# Patient Record
Sex: MALE | Race: WHITE | NOT HISPANIC OR LATINO | Employment: OTHER | URBAN - METROPOLITAN AREA
[De-identification: names, ages, dates, MRNs, and addresses within clinical notes are randomized per-mention and may not be internally consistent; named-entity substitution may affect disease eponyms.]

---

## 2018-01-08 ENCOUNTER — OFFICE VISIT CONVERTED (OUTPATIENT)
Dept: CARDIOLOGY | Facility: CLINIC | Age: 73
End: 2018-01-08
Attending: INTERNAL MEDICINE

## 2018-02-26 ENCOUNTER — CONVERSION ENCOUNTER (OUTPATIENT)
Dept: CARDIOLOGY | Facility: CLINIC | Age: 73
End: 2018-02-26
Attending: INTERNAL MEDICINE

## 2018-06-29 ENCOUNTER — OFFICE VISIT CONVERTED (OUTPATIENT)
Dept: CARDIOLOGY | Facility: CLINIC | Age: 73
End: 2018-06-29
Attending: INTERNAL MEDICINE

## 2018-07-03 ENCOUNTER — OFFICE VISIT CONVERTED (OUTPATIENT)
Dept: GASTROENTEROLOGY | Facility: CLINIC | Age: 73
End: 2018-07-03
Attending: PHYSICIAN ASSISTANT

## 2018-07-03 ENCOUNTER — CONVERSION ENCOUNTER (OUTPATIENT)
Dept: GASTROENTEROLOGY | Facility: CLINIC | Age: 73
End: 2018-07-03

## 2018-10-08 ENCOUNTER — OFFICE VISIT CONVERTED (OUTPATIENT)
Dept: OTOLARYNGOLOGY | Facility: CLINIC | Age: 73
End: 2018-10-08
Attending: OTOLARYNGOLOGY

## 2018-10-08 ENCOUNTER — CONVERSION ENCOUNTER (OUTPATIENT)
Dept: OTOLARYNGOLOGY | Facility: CLINIC | Age: 73
End: 2018-10-08

## 2018-10-26 ENCOUNTER — OFFICE VISIT CONVERTED (OUTPATIENT)
Dept: OTOLARYNGOLOGY | Facility: CLINIC | Age: 73
End: 2018-10-26
Attending: OTOLARYNGOLOGY

## 2019-01-11 ENCOUNTER — OFFICE VISIT CONVERTED (OUTPATIENT)
Dept: CARDIOLOGY | Facility: CLINIC | Age: 74
End: 2019-01-11
Attending: INTERNAL MEDICINE

## 2019-02-18 ENCOUNTER — HOSPITAL ENCOUNTER (OUTPATIENT)
Dept: LAB | Facility: HOSPITAL | Age: 74
Discharge: HOME | End: 2019-02-18
Attending: INTERNAL MEDICINE

## 2019-02-18 LAB
ALBUMIN SERPL-MCNC: 4.6 G/DL (ref 3.5–5)
ALBUMIN/GLOB SERPL: 2.2 {RATIO} (ref 1.4–2.6)
ALP SERPL-CCNC: 26 U/L (ref 56–155)
ALT SERPL-CCNC: 28 U/L (ref 10–40)
ANION GAP SERPL CALC-SCNC: 22 MMOL/L (ref 8–19)
AST SERPL-CCNC: 23 U/L (ref 15–50)
BASOPHILS # BLD AUTO: 0.05 10*3/UL (ref 0–0.2)
BASOPHILS NFR BLD AUTO: 0.8 % (ref 0–3)
BILIRUB SERPL-MCNC: 0.38 MG/DL (ref 0.2–1.3)
BUN SERPL-MCNC: 29 MG/DL (ref 5–25)
BUN/CREAT SERPL: 19 {RATIO} (ref 6–20)
CALCIUM SERPL-MCNC: 9.8 MG/DL (ref 8.7–10.4)
CHLORIDE SERPL-SCNC: 104 MMOL/L (ref 99–111)
CHOLEST SERPL-MCNC: 188 MG/DL (ref 107–200)
CHOLEST/HDLC SERPL: 6.3 {RATIO} (ref 3–6)
CONV ABS IMM GRAN: 0.02 10*3/UL (ref 0–0.2)
CONV CO2: 19 MMOL/L (ref 22–32)
CONV IMMATURE GRAN: 0.3 % (ref 0–1.8)
CONV TOTAL PROTEIN: 6.7 G/DL (ref 6.3–8.2)
CREAT UR-MCNC: 1.53 MG/DL (ref 0.7–1.2)
DEPRECATED RDW RBC AUTO: 45.3 FL (ref 35.1–43.9)
EOSINOPHIL # BLD AUTO: 0.26 10*3/UL (ref 0–0.7)
EOSINOPHIL # BLD AUTO: 4.1 % (ref 0–7)
ERYTHROCYTE [DISTWIDTH] IN BLOOD BY AUTOMATED COUNT: 13.3 % (ref 11.6–14.4)
EST. AVERAGE GLUCOSE BLD GHB EST-MCNC: 126 MG/DL
FERRITIN SERPL-MCNC: 294 NG/ML (ref 30–300)
GFR SERPLBLD BASED ON 1.73 SQ M-ARVRAT: 45 ML/MIN/{1.73_M2}
GLOBULIN UR ELPH-MCNC: 2.1 G/DL (ref 2–3.5)
GLUCOSE SERPL-MCNC: 121 MG/DL (ref 70–99)
HBA1C MFR BLD: 12.1 G/DL (ref 14–18)
HBA1C MFR BLD: 6 % (ref 3.5–5.7)
HCT VFR BLD AUTO: 37.5 % (ref 42–52)
HDLC SERPL-MCNC: 30 MG/DL (ref 40–60)
IRON SATN MFR SERPL: 28 % (ref 20–55)
IRON SERPL-MCNC: 121 UG/DL (ref 70–180)
LDLC SERPL CALC-MCNC: 114 MG/DL (ref 70–100)
LYMPHOCYTES # BLD AUTO: 2.15 10*3/UL (ref 1–5)
MCH RBC QN AUTO: 29.7 PG (ref 27–31)
MCHC RBC AUTO-ENTMCNC: 32.3 G/DL (ref 33–37)
MCV RBC AUTO: 92.1 FL (ref 80–96)
MONOCYTES # BLD AUTO: 0.43 10*3/UL (ref 0.2–1.2)
MONOCYTES NFR BLD AUTO: 6.7 % (ref 3–10)
NEUTROPHILS # BLD AUTO: 3.5 10*3/UL (ref 2–8)
NEUTROPHILS NFR BLD AUTO: 54.6 % (ref 30–85)
NRBC CBCN: 0 % (ref 0–0.7)
OSMOLALITY SERPL CALC.SUM OF ELEC: 297 MOSM/KG (ref 273–304)
PLATELET # BLD AUTO: 241 10*3/UL (ref 130–400)
PMV BLD AUTO: 11.2 FL (ref 9.4–12.4)
POTASSIUM SERPL-SCNC: 4.9 MMOL/L (ref 3.5–5.3)
RBC # BLD AUTO: 4.07 10*6/UL (ref 4.7–6.1)
SODIUM SERPL-SCNC: 140 MMOL/L (ref 135–147)
TIBC SERPL-MCNC: 426 UG/DL (ref 245–450)
TRANSFERRIN SERPL-MCNC: 298 MG/DL (ref 215–365)
TRIGL SERPL-MCNC: 219 MG/DL (ref 40–150)
VARIANT LYMPHS NFR BLD MANUAL: 33.5 % (ref 20–45)
VLDLC SERPL-MCNC: 44 MG/DL (ref 5–37)
WBC # BLD AUTO: 6.41 10*3/UL (ref 4.8–10.8)

## 2019-05-20 ENCOUNTER — HOSPITAL ENCOUNTER (OUTPATIENT)
Dept: LAB | Facility: HOSPITAL | Age: 74
Discharge: HOME | End: 2019-05-20
Attending: INTERNAL MEDICINE

## 2019-05-20 LAB
ANION GAP SERPL CALC-SCNC: 18 MMOL/L (ref 8–19)
BUN SERPL-MCNC: 25 MG/DL (ref 5–25)
BUN/CREAT SERPL: 19 {RATIO} (ref 6–20)
CALCIUM SERPL-MCNC: 9.2 MG/DL (ref 8.7–10.4)
CHLORIDE SERPL-SCNC: 105 MMOL/L (ref 99–111)
CONV CO2: 22 MMOL/L (ref 22–32)
CREAT UR-MCNC: 1.31 MG/DL (ref 0.7–1.2)
EST. AVERAGE GLUCOSE BLD GHB EST-MCNC: 148 MG/DL
GFR SERPLBLD BASED ON 1.73 SQ M-ARVRAT: 54 ML/MIN/{1.73_M2}
GLUCOSE SERPL-MCNC: 104 MG/DL (ref 70–99)
HBA1C MFR BLD: 6.8 % (ref 3.5–5.7)
OSMOLALITY SERPL CALC.SUM OF ELEC: 295 MOSM/KG (ref 273–304)
POTASSIUM SERPL-SCNC: 4.9 MMOL/L (ref 3.5–5.3)
SODIUM SERPL-SCNC: 140 MMOL/L (ref 135–147)

## 2019-06-03 ENCOUNTER — CONVERSION ENCOUNTER (OUTPATIENT)
Dept: ORTHOPEDIC SURGERY | Facility: CLINIC | Age: 74
End: 2019-06-03

## 2019-06-03 ENCOUNTER — OFFICE VISIT CONVERTED (OUTPATIENT)
Dept: ORTHOPEDIC SURGERY | Facility: CLINIC | Age: 74
End: 2019-06-03
Attending: ORTHOPAEDIC SURGERY

## 2019-07-12 ENCOUNTER — OFFICE VISIT CONVERTED (OUTPATIENT)
Dept: CARDIOLOGY | Facility: CLINIC | Age: 74
End: 2019-07-12
Attending: INTERNAL MEDICINE

## 2019-08-20 ENCOUNTER — HOSPITAL ENCOUNTER (OUTPATIENT)
Dept: LAB | Facility: HOSPITAL | Age: 74
Discharge: HOME | End: 2019-08-20
Attending: INTERNAL MEDICINE

## 2019-08-20 LAB
ALBUMIN SERPL-MCNC: 4.6 G/DL (ref 3.5–5)
ALBUMIN/GLOB SERPL: 1.9 {RATIO} (ref 1.4–2.6)
ALP SERPL-CCNC: 28 U/L (ref 56–155)
ALT SERPL-CCNC: 21 U/L (ref 10–40)
ANION GAP SERPL CALC-SCNC: 19 MMOL/L (ref 8–19)
AST SERPL-CCNC: 20 U/L (ref 15–50)
BASOPHILS # BLD AUTO: 0.05 10*3/UL (ref 0–0.2)
BASOPHILS NFR BLD AUTO: 0.6 % (ref 0–3)
BILIRUB SERPL-MCNC: 0.41 MG/DL (ref 0.2–1.3)
BUN SERPL-MCNC: 23 MG/DL (ref 5–25)
BUN/CREAT SERPL: 17 {RATIO} (ref 6–20)
CALCIUM SERPL-MCNC: 9.4 MG/DL (ref 8.7–10.4)
CHLORIDE SERPL-SCNC: 102 MMOL/L (ref 99–111)
CHOLEST SERPL-MCNC: 169 MG/DL (ref 107–200)
CHOLEST/HDLC SERPL: 5.6 {RATIO} (ref 3–6)
CONV ABS IMM GRAN: 0.04 10*3/UL (ref 0–0.2)
CONV CO2: 22 MMOL/L (ref 22–32)
CONV CREATININE URINE, RANDOM: 32 MG/DL (ref 10–300)
CONV IMMATURE GRAN: 0.5 % (ref 0–1.8)
CONV MICROALBUM.,U,RANDOM: 117.1 MG/L (ref 0–20)
CONV TOTAL PROTEIN: 7 G/DL (ref 6.3–8.2)
CREAT UR-MCNC: 1.34 MG/DL (ref 0.7–1.2)
DEPRECATED RDW RBC AUTO: 45.5 FL (ref 35.1–43.9)
EOSINOPHIL # BLD AUTO: 0.22 10*3/UL (ref 0–0.7)
EOSINOPHIL # BLD AUTO: 2.7 % (ref 0–7)
ERYTHROCYTE [DISTWIDTH] IN BLOOD BY AUTOMATED COUNT: 13.4 % (ref 11.6–14.4)
EST. AVERAGE GLUCOSE BLD GHB EST-MCNC: 134 MG/DL
FERRITIN SERPL-MCNC: 295 NG/ML (ref 30–300)
GFR SERPLBLD BASED ON 1.73 SQ M-ARVRAT: 53 ML/MIN/{1.73_M2}
GLOBULIN UR ELPH-MCNC: 2.4 G/DL (ref 2–3.5)
GLUCOSE SERPL-MCNC: 137 MG/DL (ref 70–99)
HBA1C MFR BLD: 6.3 % (ref 3.5–5.7)
HCT VFR BLD AUTO: 35.3 % (ref 42–52)
HDLC SERPL-MCNC: 30 MG/DL (ref 40–60)
HGB BLD-MCNC: 11.4 G/DL (ref 14–18)
IRON SATN MFR SERPL: 26 % (ref 20–55)
IRON SERPL-MCNC: 108 UG/DL (ref 70–180)
LDLC SERPL CALC-MCNC: 97 MG/DL (ref 70–100)
LYMPHOCYTES # BLD AUTO: 2.06 10*3/UL (ref 1–5)
LYMPHOCYTES NFR BLD AUTO: 25.6 % (ref 20–45)
MCH RBC QN AUTO: 29.9 PG (ref 27–31)
MCHC RBC AUTO-ENTMCNC: 32.3 G/DL (ref 33–37)
MCV RBC AUTO: 92.7 FL (ref 80–96)
MICROALBUMIN/CREAT UR: 365.9 MG/G{CRE} (ref 0–25)
MONOCYTES # BLD AUTO: 0.47 10*3/UL (ref 0.2–1.2)
MONOCYTES NFR BLD AUTO: 5.8 % (ref 3–10)
NEUTROPHILS # BLD AUTO: 5.21 10*3/UL (ref 2–8)
NEUTROPHILS NFR BLD AUTO: 64.8 % (ref 30–85)
NRBC CBCN: 0 % (ref 0–0.7)
OSMOLALITY SERPL CALC.SUM OF ELEC: 292 MOSM/KG (ref 273–304)
PLATELET # BLD AUTO: 296 10*3/UL (ref 130–400)
PMV BLD AUTO: 10.9 FL (ref 9.4–12.4)
POTASSIUM SERPL-SCNC: 4.7 MMOL/L (ref 3.5–5.3)
RBC # BLD AUTO: 3.81 10*6/UL (ref 4.7–6.1)
SODIUM SERPL-SCNC: 138 MMOL/L (ref 135–147)
TIBC SERPL-MCNC: 418 UG/DL (ref 245–450)
TRANSFERRIN SERPL-MCNC: 292 MG/DL (ref 215–365)
TRIGL SERPL-MCNC: 210 MG/DL (ref 40–150)
VLDLC SERPL-MCNC: 42 MG/DL (ref 5–37)
WBC # BLD AUTO: 8.05 10*3/UL (ref 4.8–10.8)

## 2019-09-16 ENCOUNTER — HOSPITAL ENCOUNTER (OUTPATIENT)
Dept: OTHER | Facility: HOSPITAL | Age: 74
Discharge: HOME | End: 2019-09-16
Attending: INTERNAL MEDICINE

## 2019-09-16 ENCOUNTER — OFFICE VISIT CONVERTED (OUTPATIENT)
Dept: OTHER | Facility: HOSPITAL | Age: 74
End: 2019-09-16
Attending: INTERNAL MEDICINE

## 2019-09-16 LAB
ALBUMIN SERPL-MCNC: 4.6 G/DL (ref 3.5–5)
ALBUMIN/GLOB SERPL: 2.3 {RATIO} (ref 1.4–2.6)
ALP SERPL-CCNC: 38 U/L (ref 56–155)
ALT SERPL-CCNC: 20 U/L (ref 10–40)
ANION GAP SERPL CALC-SCNC: 19 MMOL/L (ref 8–19)
AST SERPL-CCNC: 20 U/L (ref 15–50)
BASOPHILS # BLD AUTO: 0.06 10*3/UL (ref 0–0.2)
BASOPHILS NFR BLD AUTO: 0.7 % (ref 0–3)
BILIRUB SERPL-MCNC: <0.15 MG/DL (ref 0.2–1.3)
BUN SERPL-MCNC: 31 MG/DL (ref 5–25)
BUN/CREAT SERPL: 22 {RATIO} (ref 6–20)
CALCIUM SERPL-MCNC: 9.7 MG/DL (ref 8.7–10.4)
CHLORIDE SERPL-SCNC: 108 MMOL/L (ref 99–111)
CONV ABS IMM GRAN: 0.02 10*3/UL (ref 0–0.2)
CONV CO2: 19 MMOL/L (ref 22–32)
CONV IMMATURE GRAN: 0.2 % (ref 0–1.8)
CONV TOTAL PROTEIN: 6.6 G/DL (ref 6.3–8.2)
CREAT UR-MCNC: 1.44 MG/DL (ref 0.7–1.2)
DEPRECATED RDW RBC AUTO: 47.5 FL (ref 35.1–43.9)
EOSINOPHIL # BLD AUTO: 0.21 10*3/UL (ref 0–0.7)
EOSINOPHIL # BLD AUTO: 2.6 % (ref 0–7)
ERYTHROCYTE [DISTWIDTH] IN BLOOD BY AUTOMATED COUNT: 13.2 % (ref 11.6–14.4)
FERRITIN SERPL-MCNC: 237 NG/ML (ref 30–300)
FOLATE SERPL-MCNC: 16.8 NG/ML (ref 4.8–20)
GFR SERPLBLD BASED ON 1.73 SQ M-ARVRAT: 48 ML/MIN/{1.73_M2}
GLOBULIN UR ELPH-MCNC: 2 G/DL (ref 2–3.5)
GLUCOSE SERPL-MCNC: 117 MG/DL (ref 70–99)
HCT VFR BLD AUTO: 37.5 % (ref 42–52)
HGB BLD-MCNC: 11.5 G/DL (ref 14–18)
IRON SATN MFR SERPL: 14 % (ref 20–55)
IRON SERPL-MCNC: 54 UG/DL (ref 70–180)
LYMPHOCYTES # BLD AUTO: 2.47 10*3/UL (ref 1–5)
LYMPHOCYTES NFR BLD AUTO: 30.5 % (ref 20–45)
MCH RBC QN AUTO: 29.9 PG (ref 27–31)
MCHC RBC AUTO-ENTMCNC: 30.7 G/DL (ref 33–37)
MCV RBC AUTO: 97.7 FL (ref 80–96)
MONOCYTES # BLD AUTO: 0.57 10*3/UL (ref 0.2–1.2)
MONOCYTES NFR BLD AUTO: 7 % (ref 3–10)
NEUTROPHILS # BLD AUTO: 4.77 10*3/UL (ref 2–8)
NEUTROPHILS NFR BLD AUTO: 59 % (ref 30–85)
NRBC CBCN: 0 % (ref 0–0.7)
OSMOLALITY SERPL CALC.SUM OF ELEC: 302 MOSM/KG (ref 273–304)
PLATELET # BLD AUTO: 255 10*3/UL (ref 130–400)
PMV BLD AUTO: 10.5 FL (ref 9.4–12.4)
POTASSIUM SERPL-SCNC: 4.4 MMOL/L (ref 3.5–5.3)
RBC # BLD AUTO: 3.84 10*6/UL (ref 4.7–6.1)
RETICS # AUTO: 0.07 10*6/UL (ref 0.03–0.1)
RETICS/RBC NFR AUTO: 1.81 % (ref 0.51–1.81)
SODIUM SERPL-SCNC: 142 MMOL/L (ref 135–147)
T4 FREE SERPL-MCNC: 1.1 NG/DL (ref 0.9–1.8)
TIBC SERPL-MCNC: 396 UG/DL (ref 245–450)
TRANSFERRIN SERPL-MCNC: 277 MG/DL (ref 215–365)
TSH SERPL-ACNC: 1.76 M[IU]/L (ref 0.27–4.2)
VIT B12 SERPL-MCNC: 342 PG/ML (ref 211–911)
WBC # BLD AUTO: 8.1 10*3/UL (ref 4.8–10.8)

## 2019-09-17 LAB
ALBUMIN SERPL-MCNC: 3.9 G/DL (ref 2.9–4.4)
ALBUMIN/GLOB SERPL: 1.7 {RATIO} (ref 0.7–1.7)
ALPHA2 GLOB SERPL ELPH-MCNC: 0.7 G/DL (ref 0.4–1)
BETA GLOBULIN: 1 G/DL (ref 0.7–1.3)
CONV ALPHA-1-GLOBULIN: 0.2 G/DL (ref 0–0.4)
CONV IMMUNOGLOBULIN G (IGG): 520 MG/DL (ref 700–1600)
CONV IMMUNOGLOBULIN M (IGM): 23 MG/DL (ref 15–143)
CONV PE INTERPRETATION: ABNORMAL
CONV PE NOTE: ABNORMAL
CONV TOTAL PROTEIN: 6.2 G/DL (ref 6–8.5)
FREE LIGHT CHAINS: 36 MG/L (ref 3.3–19.4)
GAMMA GLOB SERPL ELPH-MCNC: 0.4 G/DL (ref 0.4–1.8)
GLOBULIN UR ELPH-MCNC: 2.3 G/DL (ref 2.2–3.9)
IGA SERPL-MCNC: 124 MG/DL (ref 61–437)
KAPPA LC/LAMBDA SER: 1.83 {RATIO} (ref 0.26–1.65)
LAMBDA LC FREE SERPL-MCNC: 19.7 MG/L (ref 5.7–26.3)
M-SPIKE: ABNORMAL G/DL
PROT PATTERN SERPL IFE-IMP: ABNORMAL

## 2019-09-19 LAB — EPO SERPL-ACNC: 8.8 MIU/ML (ref 2.6–18.5)

## 2019-10-02 ENCOUNTER — OFFICE VISIT CONVERTED (OUTPATIENT)
Dept: OTHER | Facility: HOSPITAL | Age: 74
End: 2019-10-02
Attending: INTERNAL MEDICINE

## 2019-10-28 ENCOUNTER — CONVERSION ENCOUNTER (OUTPATIENT)
Dept: GASTROENTEROLOGY | Facility: CLINIC | Age: 74
End: 2019-10-28

## 2019-10-28 ENCOUNTER — OFFICE VISIT CONVERTED (OUTPATIENT)
Dept: GASTROENTEROLOGY | Facility: CLINIC | Age: 74
End: 2019-10-28
Attending: PHYSICIAN ASSISTANT

## 2019-11-06 ENCOUNTER — PROCEDURE VISIT CONVERTED (OUTPATIENT)
Dept: GASTROENTEROLOGY | Facility: CLINIC | Age: 74
End: 2019-11-06
Attending: NURSE PRACTITIONER

## 2019-11-26 ENCOUNTER — HOSPITAL ENCOUNTER (OUTPATIENT)
Dept: LAB | Facility: HOSPITAL | Age: 74
Discharge: HOME | End: 2019-11-26
Attending: INTERNAL MEDICINE

## 2019-11-26 LAB
ANION GAP SERPL CALC-SCNC: 18 MMOL/L (ref 8–19)
BASOPHILS # BLD AUTO: 0.07 10*3/UL (ref 0–0.2)
BASOPHILS NFR BLD AUTO: 0.9 % (ref 0–3)
BUN SERPL-MCNC: 26 MG/DL (ref 5–25)
BUN/CREAT SERPL: 19 {RATIO} (ref 6–20)
CALCIUM SERPL-MCNC: 9.9 MG/DL (ref 8.7–10.4)
CHLORIDE SERPL-SCNC: 104 MMOL/L (ref 99–111)
CONV ABS IMM GRAN: 0.01 10*3/UL (ref 0–0.2)
CONV CO2: 23 MMOL/L (ref 22–32)
CONV IMMATURE GRAN: 0.1 % (ref 0–1.8)
CREAT UR-MCNC: 1.37 MG/DL (ref 0.7–1.2)
DEPRECATED RDW RBC AUTO: 43.7 FL (ref 35.1–43.9)
EOSINOPHIL # BLD AUTO: 0.26 10*3/UL (ref 0–0.7)
EOSINOPHIL # BLD AUTO: 3.5 % (ref 0–7)
ERYTHROCYTE [DISTWIDTH] IN BLOOD BY AUTOMATED COUNT: 12.8 % (ref 11.6–14.4)
EST. AVERAGE GLUCOSE BLD GHB EST-MCNC: 143 MG/DL
FOLATE SERPL-MCNC: 18.4 NG/ML (ref 4.8–20)
GFR SERPLBLD BASED ON 1.73 SQ M-ARVRAT: 51 ML/MIN/{1.73_M2}
GLUCOSE SERPL-MCNC: 111 MG/DL (ref 70–99)
HBA1C MFR BLD: 6.6 % (ref 3.5–5.7)
HCT VFR BLD AUTO: 38.1 % (ref 42–52)
HGB BLD-MCNC: 12.2 G/DL (ref 14–18)
IRON SATN MFR SERPL: 20 % (ref 20–55)
IRON SERPL-MCNC: 95 UG/DL (ref 70–180)
LYMPHOCYTES # BLD AUTO: 2.44 10*3/UL (ref 1–5)
LYMPHOCYTES NFR BLD AUTO: 32.8 % (ref 20–45)
MCH RBC QN AUTO: 29.8 PG (ref 27–31)
MCHC RBC AUTO-ENTMCNC: 32 G/DL (ref 33–37)
MCV RBC AUTO: 92.9 FL (ref 80–96)
MONOCYTES # BLD AUTO: 0.45 10*3/UL (ref 0.2–1.2)
MONOCYTES NFR BLD AUTO: 6 % (ref 3–10)
NEUTROPHILS # BLD AUTO: 4.22 10*3/UL (ref 2–8)
NEUTROPHILS NFR BLD AUTO: 56.7 % (ref 30–85)
NRBC CBCN: 0 % (ref 0–0.7)
OSMOLALITY SERPL CALC.SUM OF ELEC: 295 MOSM/KG (ref 273–304)
PLATELET # BLD AUTO: 237 10*3/UL (ref 130–400)
PMV BLD AUTO: 11.2 FL (ref 9.4–12.4)
POTASSIUM SERPL-SCNC: 4.6 MMOL/L (ref 3.5–5.3)
RBC # BLD AUTO: 4.1 10*6/UL (ref 4.7–6.1)
SODIUM SERPL-SCNC: 140 MMOL/L (ref 135–147)
TIBC SERPL-MCNC: 466 UG/DL (ref 245–450)
TRANSFERRIN SERPL-MCNC: 326 MG/DL (ref 215–365)
VIT B12 SERPL-MCNC: 871 PG/ML (ref 211–911)
WBC # BLD AUTO: 7.45 10*3/UL (ref 4.8–10.8)

## 2019-12-04 ENCOUNTER — OFFICE VISIT CONVERTED (OUTPATIENT)
Dept: OTHER | Facility: HOSPITAL | Age: 74
End: 2019-12-04
Attending: INTERNAL MEDICINE

## 2020-03-03 ENCOUNTER — HOSPITAL ENCOUNTER (OUTPATIENT)
Dept: LAB | Facility: HOSPITAL | Age: 75
Discharge: HOME | End: 2020-03-03
Attending: INTERNAL MEDICINE

## 2020-03-03 LAB
ALBUMIN SERPL-MCNC: 4.5 G/DL (ref 3.5–5)
ALBUMIN/GLOB SERPL: 2 {RATIO} (ref 1.4–2.6)
ALP SERPL-CCNC: 28 U/L (ref 56–155)
ALT SERPL-CCNC: 23 U/L (ref 10–40)
ANION GAP SERPL CALC-SCNC: 22 MMOL/L (ref 8–19)
AST SERPL-CCNC: 20 U/L (ref 15–50)
BILIRUB SERPL-MCNC: 0.44 MG/DL (ref 0.2–1.3)
BUN SERPL-MCNC: 26 MG/DL (ref 5–25)
BUN/CREAT SERPL: 18 {RATIO} (ref 6–20)
CALCIUM SERPL-MCNC: 10.1 MG/DL (ref 8.7–10.4)
CHLORIDE SERPL-SCNC: 101 MMOL/L (ref 99–111)
CHOLEST SERPL-MCNC: 179 MG/DL (ref 107–200)
CHOLEST/HDLC SERPL: 6.2 {RATIO} (ref 3–6)
CONV CO2: 20 MMOL/L (ref 22–32)
CONV CREATININE URINE, RANDOM: 63.7 MG/DL (ref 10–300)
CONV MICROALBUM.,U,RANDOM: 495.6 MG/L (ref 0–20)
CONV TOTAL PROTEIN: 6.8 G/DL (ref 6.3–8.2)
CREAT UR-MCNC: 1.43 MG/DL (ref 0.7–1.2)
EST. AVERAGE GLUCOSE BLD GHB EST-MCNC: 143 MG/DL
GFR SERPLBLD BASED ON 1.73 SQ M-ARVRAT: 48 ML/MIN/{1.73_M2}
GLOBULIN UR ELPH-MCNC: 2.3 G/DL (ref 2–3.5)
GLUCOSE SERPL-MCNC: 130 MG/DL (ref 70–99)
HBA1C MFR BLD: 6.6 % (ref 3.5–5.7)
HDLC SERPL-MCNC: 29 MG/DL (ref 40–60)
LDLC SERPL CALC-MCNC: 109 MG/DL (ref 70–100)
MICROALBUMIN/CREAT UR: 778 MG/G{CRE} (ref 0–25)
OSMOLALITY SERPL CALC.SUM OF ELEC: 293 MOSM/KG (ref 273–304)
POTASSIUM SERPL-SCNC: 4.9 MMOL/L (ref 3.5–5.3)
SODIUM SERPL-SCNC: 138 MMOL/L (ref 135–147)
TRIGL SERPL-MCNC: 205 MG/DL (ref 40–150)
VLDLC SERPL-MCNC: 41 MG/DL (ref 5–37)

## 2020-04-02 ENCOUNTER — HOSPITAL ENCOUNTER (OUTPATIENT)
Dept: OTHER | Facility: HOSPITAL | Age: 75
Discharge: HOME | End: 2020-04-02
Attending: INTERNAL MEDICINE

## 2020-04-02 LAB
BASOPHILS # BLD AUTO: 0.07 10*3/UL (ref 0–0.2)
BASOPHILS NFR BLD AUTO: 0.9 % (ref 0–3)
CONV ABS IMM GRAN: 0.03 10*3/UL (ref 0–0.2)
CONV IMMATURE GRAN: 0.4 % (ref 0–1.8)
DEPRECATED RDW RBC AUTO: 44.2 FL (ref 35.1–43.9)
EOSINOPHIL # BLD AUTO: 0.27 10*3/UL (ref 0–0.7)
EOSINOPHIL # BLD AUTO: 3.6 % (ref 0–7)
ERYTHROCYTE [DISTWIDTH] IN BLOOD BY AUTOMATED COUNT: 12.8 % (ref 11.6–14.4)
HCT VFR BLD AUTO: 35.2 % (ref 42–52)
HGB BLD-MCNC: 11.3 G/DL (ref 14–18)
IRON SATN MFR SERPL: 33 % (ref 20–55)
IRON SERPL-MCNC: 109 UG/DL (ref 70–180)
LYMPHOCYTES # BLD AUTO: 2.21 10*3/UL (ref 1–5)
LYMPHOCYTES NFR BLD AUTO: 29.2 % (ref 20–45)
MCH RBC QN AUTO: 30.4 PG (ref 27–31)
MCHC RBC AUTO-ENTMCNC: 32.1 G/DL (ref 33–37)
MCV RBC AUTO: 94.6 FL (ref 80–96)
MONOCYTES # BLD AUTO: 0.6 10*3/UL (ref 0.2–1.2)
MONOCYTES NFR BLD AUTO: 7.9 % (ref 3–10)
NEUTROPHILS # BLD AUTO: 4.38 10*3/UL (ref 2–8)
NEUTROPHILS NFR BLD AUTO: 58 % (ref 30–85)
NRBC CBCN: 0 % (ref 0–0.7)
PLATELET # BLD AUTO: 220 10*3/UL (ref 130–400)
PMV BLD AUTO: 11.5 FL (ref 9.4–12.4)
RBC # BLD AUTO: 3.72 10*6/UL (ref 4.7–6.1)
RETICS # AUTO: 0.07 10*6/UL (ref 0.03–0.1)
RETICS/RBC NFR AUTO: 1.84 % (ref 0.51–1.81)
TIBC SERPL-MCNC: 333 UG/DL (ref 245–450)
TRANSFERRIN SERPL-MCNC: 233 MG/DL (ref 215–365)
WBC # BLD AUTO: 7.56 10*3/UL (ref 4.8–10.8)

## 2020-04-13 ENCOUNTER — OFFICE VISIT CONVERTED (OUTPATIENT)
Dept: OTHER | Facility: HOSPITAL | Age: 75
End: 2020-04-13
Attending: INTERNAL MEDICINE

## 2020-06-09 ENCOUNTER — HOSPITAL ENCOUNTER (OUTPATIENT)
Dept: LAB | Facility: HOSPITAL | Age: 75
Discharge: HOME | End: 2020-06-09
Attending: INTERNAL MEDICINE

## 2020-06-09 LAB
ALBUMIN SERPL-MCNC: 4.3 G/DL (ref 3.5–5)
ALBUMIN/GLOB SERPL: 1.8 {RATIO} (ref 1.4–2.6)
ALP SERPL-CCNC: 40 U/L (ref 56–155)
ALT SERPL-CCNC: 20 U/L (ref 10–40)
ANION GAP SERPL CALC-SCNC: 18 MMOL/L (ref 8–19)
AST SERPL-CCNC: 18 U/L (ref 15–50)
BASOPHILS # BLD AUTO: 0.05 10*3/UL (ref 0–0.2)
BASOPHILS NFR BLD AUTO: 0.6 % (ref 0–3)
BILIRUB SERPL-MCNC: 0.45 MG/DL (ref 0.2–1.3)
BUN SERPL-MCNC: 30 MG/DL (ref 5–25)
BUN/CREAT SERPL: 26 {RATIO} (ref 6–20)
CALCIUM SERPL-MCNC: 9.6 MG/DL (ref 8.7–10.4)
CHLORIDE SERPL-SCNC: 103 MMOL/L (ref 99–111)
CHOLEST SERPL-MCNC: 160 MG/DL (ref 107–200)
CHOLEST/HDLC SERPL: 5.3 {RATIO} (ref 3–6)
CK SERPL-CCNC: 182 U/L (ref 57–374)
CONV ABS IMM GRAN: 0.03 10*3/UL (ref 0–0.2)
CONV CO2: 19 MMOL/L (ref 22–32)
CONV IMMATURE GRAN: 0.4 % (ref 0–1.8)
CONV TOTAL PROTEIN: 6.7 G/DL (ref 6.3–8.2)
CREAT UR-MCNC: 1.14 MG/DL (ref 0.7–1.2)
DEPRECATED RDW RBC AUTO: 41.8 FL (ref 35.1–43.9)
EOSINOPHIL # BLD AUTO: 0.22 10*3/UL (ref 0–0.7)
EOSINOPHIL # BLD AUTO: 2.7 % (ref 0–7)
ERYTHROCYTE [DISTWIDTH] IN BLOOD BY AUTOMATED COUNT: 12.5 % (ref 11.6–14.4)
EST. AVERAGE GLUCOSE BLD GHB EST-MCNC: 140 MG/DL
FOLATE SERPL-MCNC: >20 NG/ML (ref 4.8–20)
GFR SERPLBLD BASED ON 1.73 SQ M-ARVRAT: >60 ML/MIN/{1.73_M2}
GLOBULIN UR ELPH-MCNC: 2.4 G/DL (ref 2–3.5)
GLUCOSE SERPL-MCNC: 131 MG/DL (ref 70–99)
HBA1C MFR BLD: 6.5 % (ref 3.5–5.7)
HCT VFR BLD AUTO: 39.6 % (ref 42–52)
HDLC SERPL-MCNC: 30 MG/DL (ref 40–60)
HGB BLD-MCNC: 13 G/DL (ref 14–18)
IRON SATN MFR SERPL: 42 % (ref 20–55)
IRON SERPL-MCNC: 136 UG/DL (ref 70–180)
LDLC SERPL CALC-MCNC: 72 MG/DL (ref 70–100)
LYMPHOCYTES # BLD AUTO: 2.16 10*3/UL (ref 1–5)
LYMPHOCYTES NFR BLD AUTO: 26.6 % (ref 20–45)
MCH RBC QN AUTO: 30.1 PG (ref 27–31)
MCHC RBC AUTO-ENTMCNC: 32.8 G/DL (ref 33–37)
MCV RBC AUTO: 91.7 FL (ref 80–96)
MONOCYTES # BLD AUTO: 0.53 10*3/UL (ref 0.2–1.2)
MONOCYTES NFR BLD AUTO: 6.5 % (ref 3–10)
NEUTROPHILS # BLD AUTO: 5.13 10*3/UL (ref 2–8)
NEUTROPHILS NFR BLD AUTO: 63.2 % (ref 30–85)
NRBC CBCN: 0 % (ref 0–0.7)
OSMOLALITY SERPL CALC.SUM OF ELEC: 288 MOSM/KG (ref 273–304)
PLATELET # BLD AUTO: 223 10*3/UL (ref 130–400)
PMV BLD AUTO: 11.6 FL (ref 9.4–12.4)
POTASSIUM SERPL-SCNC: 4.9 MMOL/L (ref 3.5–5.3)
RBC # BLD AUTO: 4.32 10*6/UL (ref 4.7–6.1)
SODIUM SERPL-SCNC: 135 MMOL/L (ref 135–147)
TIBC SERPL-MCNC: 325 UG/DL (ref 245–450)
TRANSFERRIN SERPL-MCNC: 227 MG/DL (ref 215–365)
TRIGL SERPL-MCNC: 291 MG/DL (ref 40–150)
VIT B12 SERPL-MCNC: 923 PG/ML (ref 211–911)
VLDLC SERPL-MCNC: 58 MG/DL (ref 5–37)
WBC # BLD AUTO: 8.12 10*3/UL (ref 4.8–10.8)

## 2020-06-16 ENCOUNTER — OFFICE VISIT CONVERTED (OUTPATIENT)
Dept: OTHER | Facility: HOSPITAL | Age: 75
End: 2020-06-16
Attending: INTERNAL MEDICINE

## 2020-09-08 ENCOUNTER — HOSPITAL ENCOUNTER (OUTPATIENT)
Dept: LAB | Facility: HOSPITAL | Age: 75
Discharge: HOME | End: 2020-09-08
Attending: INTERNAL MEDICINE

## 2020-09-08 LAB
ALBUMIN SERPL-MCNC: 4 G/DL (ref 3.5–5)
ALBUMIN/GLOB SERPL: 1.7 {RATIO} (ref 1.4–2.6)
ALP SERPL-CCNC: 37 U/L (ref 56–155)
ALT SERPL-CCNC: 22 U/L (ref 10–40)
ANION GAP SERPL CALC-SCNC: 17 MMOL/L (ref 8–19)
AST SERPL-CCNC: 21 U/L (ref 15–50)
BILIRUB SERPL-MCNC: 0.4 MG/DL (ref 0.2–1.3)
BUN SERPL-MCNC: 21 MG/DL (ref 5–25)
BUN/CREAT SERPL: 19 {RATIO} (ref 6–20)
CALCIUM SERPL-MCNC: 9.2 MG/DL (ref 8.7–10.4)
CHLORIDE SERPL-SCNC: 104 MMOL/L (ref 99–111)
CHOLEST SERPL-MCNC: 151 MG/DL (ref 107–200)
CHOLEST/HDLC SERPL: 4.9 {RATIO} (ref 3–6)
CONV CO2: 22 MMOL/L (ref 22–32)
CONV TOTAL PROTEIN: 6.3 G/DL (ref 6.3–8.2)
CREAT UR-MCNC: 1.12 MG/DL (ref 0.7–1.2)
EST. AVERAGE GLUCOSE BLD GHB EST-MCNC: 134 MG/DL
GFR SERPLBLD BASED ON 1.73 SQ M-ARVRAT: >60 ML/MIN/{1.73_M2}
GLOBULIN UR ELPH-MCNC: 2.3 G/DL (ref 2–3.5)
GLUCOSE SERPL-MCNC: 127 MG/DL (ref 70–99)
HBA1C MFR BLD: 6.3 % (ref 3.5–5.7)
HDLC SERPL-MCNC: 31 MG/DL (ref 40–60)
LDLC SERPL CALC-MCNC: 76 MG/DL (ref 70–100)
OSMOLALITY SERPL CALC.SUM OF ELEC: 291 MOSM/KG (ref 273–304)
POTASSIUM SERPL-SCNC: 5.3 MMOL/L (ref 3.5–5.3)
SODIUM SERPL-SCNC: 138 MMOL/L (ref 135–147)
TRIGL SERPL-MCNC: 219 MG/DL (ref 40–150)
VLDLC SERPL-MCNC: 44 MG/DL (ref 5–37)

## 2020-11-06 ENCOUNTER — HOSPITAL ENCOUNTER (OUTPATIENT)
Dept: GENERAL RADIOLOGY | Facility: HOSPITAL | Age: 75
Discharge: HOME | End: 2020-11-06
Attending: INTERNAL MEDICINE

## 2021-01-12 ENCOUNTER — HOSPITAL ENCOUNTER (OUTPATIENT)
Dept: LAB | Facility: HOSPITAL | Age: 76
Discharge: HOME | End: 2021-01-12
Attending: INTERNAL MEDICINE

## 2021-01-12 LAB
ALBUMIN SERPL-MCNC: 3.9 G/DL (ref 3.5–5)
ALBUMIN/GLOB SERPL: 1.7 {RATIO} (ref 1.4–2.6)
ALP SERPL-CCNC: 46 U/L (ref 56–155)
ALT SERPL-CCNC: 13 U/L (ref 10–40)
ANION GAP SERPL CALC-SCNC: 16 MMOL/L (ref 8–19)
AST SERPL-CCNC: 13 U/L (ref 15–50)
BASOPHILS # BLD AUTO: 0.05 10*3/UL (ref 0–0.2)
BASOPHILS NFR BLD AUTO: 0.7 % (ref 0–3)
BILIRUB SERPL-MCNC: 0.38 MG/DL (ref 0.2–1.3)
BUN SERPL-MCNC: 22 MG/DL (ref 5–25)
BUN/CREAT SERPL: 17 {RATIO} (ref 6–20)
CALCIUM SERPL-MCNC: 9.3 MG/DL (ref 8.7–10.4)
CHLORIDE SERPL-SCNC: 101 MMOL/L (ref 99–111)
CHOLEST SERPL-MCNC: 153 MG/DL (ref 107–200)
CHOLEST/HDLC SERPL: 4.5 {RATIO} (ref 3–6)
CONV ABS IMM GRAN: 0.03 10*3/UL (ref 0–0.2)
CONV CO2: 25 MMOL/L (ref 22–32)
CONV IMMATURE GRAN: 0.4 % (ref 0–1.8)
CONV TOTAL PROTEIN: 6.2 G/DL (ref 6.3–8.2)
CREAT UR-MCNC: 1.28 MG/DL (ref 0.7–1.2)
DEPRECATED RDW RBC AUTO: 42.7 FL (ref 35.1–43.9)
EOSINOPHIL # BLD AUTO: 0.3 10*3/UL (ref 0–0.7)
EOSINOPHIL # BLD AUTO: 4.2 % (ref 0–7)
ERYTHROCYTE [DISTWIDTH] IN BLOOD BY AUTOMATED COUNT: 12.9 % (ref 11.6–14.4)
EST. AVERAGE GLUCOSE BLD GHB EST-MCNC: 134 MG/DL
FERRITIN SERPL-MCNC: 277 NG/ML (ref 30–300)
GFR SERPLBLD BASED ON 1.73 SQ M-ARVRAT: 55 ML/MIN/{1.73_M2}
GLOBULIN UR ELPH-MCNC: 2.3 G/DL (ref 2–3.5)
GLUCOSE SERPL-MCNC: 145 MG/DL (ref 70–99)
HBA1C MFR BLD: 6.3 % (ref 3.5–5.7)
HCT VFR BLD AUTO: 38.9 % (ref 42–52)
HDLC SERPL-MCNC: 34 MG/DL (ref 40–60)
HGB BLD-MCNC: 12.7 G/DL (ref 14–18)
IRON SATN MFR SERPL: 45 % (ref 20–55)
IRON SERPL-MCNC: 140 UG/DL (ref 70–180)
LDLC SERPL CALC-MCNC: 80 MG/DL (ref 70–100)
LYMPHOCYTES # BLD AUTO: 1.86 10*3/UL (ref 1–5)
LYMPHOCYTES NFR BLD AUTO: 26 % (ref 20–45)
MCH RBC QN AUTO: 29.7 PG (ref 27–31)
MCHC RBC AUTO-ENTMCNC: 32.6 G/DL (ref 33–37)
MCV RBC AUTO: 91.1 FL (ref 80–96)
MONOCYTES # BLD AUTO: 0.51 10*3/UL (ref 0.2–1.2)
MONOCYTES NFR BLD AUTO: 7.1 % (ref 3–10)
NEUTROPHILS # BLD AUTO: 4.41 10*3/UL (ref 2–8)
NEUTROPHILS NFR BLD AUTO: 61.6 % (ref 30–85)
NRBC CBCN: 0 % (ref 0–0.7)
OSMOLALITY SERPL CALC.SUM OF ELEC: 290 MOSM/KG (ref 273–304)
PLATELET # BLD AUTO: 160 10*3/UL (ref 130–400)
PMV BLD AUTO: 11.7 FL (ref 9.4–12.4)
POTASSIUM SERPL-SCNC: 5.3 MMOL/L (ref 3.5–5.3)
RBC # BLD AUTO: 4.27 10*6/UL (ref 4.7–6.1)
SODIUM SERPL-SCNC: 137 MMOL/L (ref 135–147)
TIBC SERPL-MCNC: 310 UG/DL (ref 245–450)
TRANSFERRIN SERPL-MCNC: 217 MG/DL (ref 215–365)
TRIGL SERPL-MCNC: 195 MG/DL (ref 40–150)
VLDLC SERPL-MCNC: 39 MG/DL (ref 5–37)
WBC # BLD AUTO: 7.16 10*3/UL (ref 4.8–10.8)

## 2021-01-18 ENCOUNTER — HOSPITAL ENCOUNTER (OUTPATIENT)
Dept: CARDIOLOGY | Facility: HOSPITAL | Age: 76
Discharge: HOME | End: 2021-01-18
Attending: INTERNAL MEDICINE

## 2021-05-11 ENCOUNTER — HOSPITAL ENCOUNTER (OUTPATIENT)
Dept: LAB | Facility: HOSPITAL | Age: 76
Discharge: HOME | End: 2021-05-11
Attending: INTERNAL MEDICINE

## 2021-05-11 LAB
ANION GAP SERPL CALC-SCNC: 19 MMOL/L (ref 8–19)
BUN SERPL-MCNC: 26 MG/DL (ref 5–25)
BUN/CREAT SERPL: 16 {RATIO} (ref 6–20)
CALCIUM SERPL-MCNC: 9.2 MG/DL (ref 8.7–10.4)
CHLORIDE SERPL-SCNC: 105 MMOL/L (ref 99–111)
CONV CO2: 20 MMOL/L (ref 22–32)
CONV CREATININE URINE, RANDOM: 176.6 MG/DL (ref 10–300)
CONV MICROALBUM.,U,RANDOM: 4884.8 MG/L (ref 0–20)
CREAT UR-MCNC: 1.61 MG/DL (ref 0.7–1.2)
GFR SERPLBLD BASED ON 1.73 SQ M-ARVRAT: 42 ML/MIN/{1.73_M2}
GLUCOSE SERPL-MCNC: 141 MG/DL (ref 70–99)
MICROALBUMIN/CREAT UR: 2766 MG/G{CRE} (ref 0–25)
OSMOLALITY SERPL CALC.SUM OF ELEC: 295 MOSM/KG (ref 273–304)
POTASSIUM SERPL-SCNC: 4.9 MMOL/L (ref 3.5–5.3)
SODIUM SERPL-SCNC: 139 MMOL/L (ref 135–147)

## 2021-05-12 LAB
EST. AVERAGE GLUCOSE BLD GHB EST-MCNC: 134 MG/DL
HBA1C MFR BLD: 6.3 % (ref 3.5–5.7)

## 2021-05-15 VITALS — HEART RATE: 70 BPM | BODY MASS INDEX: 31.23 KG/M2 | HEIGHT: 70 IN | WEIGHT: 218.12 LBS | OXYGEN SATURATION: 98 %

## 2021-05-15 VITALS
HEART RATE: 72 BPM | DIASTOLIC BLOOD PRESSURE: 88 MMHG | SYSTOLIC BLOOD PRESSURE: 162 MMHG | WEIGHT: 214 LBS | BODY MASS INDEX: 30.64 KG/M2 | HEIGHT: 70 IN

## 2021-05-15 VITALS
WEIGHT: 222 LBS | RESPIRATION RATE: 16 BRPM | DIASTOLIC BLOOD PRESSURE: 77 MMHG | SYSTOLIC BLOOD PRESSURE: 149 MMHG | OXYGEN SATURATION: 100 % | HEART RATE: 64 BPM

## 2021-05-16 VITALS
OXYGEN SATURATION: 93 % | SYSTOLIC BLOOD PRESSURE: 159 MMHG | HEIGHT: 70 IN | WEIGHT: 231.25 LBS | TEMPERATURE: 98.4 F | HEART RATE: 63 BPM | RESPIRATION RATE: 16 BRPM | BODY MASS INDEX: 33.11 KG/M2 | DIASTOLIC BLOOD PRESSURE: 86 MMHG

## 2021-05-16 VITALS
DIASTOLIC BLOOD PRESSURE: 66 MMHG | RESPIRATION RATE: 16 BRPM | TEMPERATURE: 98.3 F | WEIGHT: 234 LBS | HEART RATE: 75 BPM | HEIGHT: 70 IN | SYSTOLIC BLOOD PRESSURE: 140 MMHG | OXYGEN SATURATION: 92 % | BODY MASS INDEX: 33.5 KG/M2

## 2021-05-16 VITALS
SYSTOLIC BLOOD PRESSURE: 144 MMHG | HEIGHT: 70 IN | HEART RATE: 70 BPM | BODY MASS INDEX: 33.39 KG/M2 | RESPIRATION RATE: 12 BRPM | DIASTOLIC BLOOD PRESSURE: 64 MMHG | WEIGHT: 233.25 LBS | OXYGEN SATURATION: 96 %

## 2021-05-16 VITALS
BODY MASS INDEX: 32.93 KG/M2 | SYSTOLIC BLOOD PRESSURE: 158 MMHG | DIASTOLIC BLOOD PRESSURE: 90 MMHG | HEIGHT: 70 IN | WEIGHT: 230 LBS | HEART RATE: 62 BPM

## 2021-05-16 VITALS
DIASTOLIC BLOOD PRESSURE: 82 MMHG | BODY MASS INDEX: 34.07 KG/M2 | SYSTOLIC BLOOD PRESSURE: 148 MMHG | WEIGHT: 238 LBS | HEART RATE: 72 BPM | HEIGHT: 70 IN

## 2021-05-16 VITALS
BODY MASS INDEX: 38.08 KG/M2 | SYSTOLIC BLOOD PRESSURE: 148 MMHG | WEIGHT: 266 LBS | DIASTOLIC BLOOD PRESSURE: 78 MMHG | HEART RATE: 66 BPM | HEIGHT: 70 IN

## 2021-05-16 VITALS
BODY MASS INDEX: 33.93 KG/M2 | HEART RATE: 74 BPM | DIASTOLIC BLOOD PRESSURE: 86 MMHG | WEIGHT: 237 LBS | HEIGHT: 70 IN | SYSTOLIC BLOOD PRESSURE: 150 MMHG

## 2021-05-17 ENCOUNTER — HOSPITAL ENCOUNTER (OUTPATIENT)
Dept: LAB | Facility: HOSPITAL | Age: 76
Discharge: HOME | End: 2021-05-17
Attending: INTERNAL MEDICINE

## 2021-05-17 LAB
APPEARANCE UR: CLEAR
BILIRUB UR QL: NEGATIVE
COLOR UR: YELLOW
CONV BACTERIA: NEGATIVE
CONV COLLECTION SOURCE (UA): ABNORMAL
CONV UROBILINOGEN IN URINE BY AUTOMATED TEST STRIP: 0.2 {EHRLICHU}/DL (ref 0.1–1)
GLUCOSE UR QL: NEGATIVE MG/DL
HGB UR QL STRIP: NEGATIVE
KETONES UR QL STRIP: NEGATIVE MG/DL
LEUKOCYTE ESTERASE UR QL STRIP: NEGATIVE
NITRITE UR QL STRIP: NEGATIVE
PH UR STRIP.AUTO: 5 [PH] (ref 5–8)
PROT UR QL: 300 MG/DL
RBC #/AREA URNS HPF: ABNORMAL /[HPF]
SP GR UR: 1.01 (ref 1–1.03)
WBC #/AREA URNS HPF: ABNORMAL /[HPF]

## 2021-05-19 ENCOUNTER — HOSPITAL ENCOUNTER (OUTPATIENT)
Dept: ULTRASOUND IMAGING | Facility: HOSPITAL | Age: 76
Discharge: HOME | End: 2021-05-19
Attending: INTERNAL MEDICINE

## 2021-05-28 VITALS
BODY MASS INDEX: 31.35 KG/M2 | SYSTOLIC BLOOD PRESSURE: 152 MMHG | BODY MASS INDEX: 31.64 KG/M2 | TEMPERATURE: 98.1 F | RESPIRATION RATE: 20 BRPM | DIASTOLIC BLOOD PRESSURE: 74 MMHG | HEIGHT: 70 IN | OXYGEN SATURATION: 91 % | WEIGHT: 222.6 LBS | RESPIRATION RATE: 18 BRPM | RESPIRATION RATE: 12 BRPM | DIASTOLIC BLOOD PRESSURE: 96 MMHG | HEART RATE: 64 BPM | OXYGEN SATURATION: 94 % | WEIGHT: 221 LBS | SYSTOLIC BLOOD PRESSURE: 124 MMHG | HEART RATE: 74 BPM | HEIGHT: 70 IN | OXYGEN SATURATION: 94 % | HEART RATE: 52 BPM | SYSTOLIC BLOOD PRESSURE: 141 MMHG | TEMPERATURE: 98.1 F | SYSTOLIC BLOOD PRESSURE: 152 MMHG | HEIGHT: 70 IN | WEIGHT: 225.4 LBS | HEIGHT: 70 IN | DIASTOLIC BLOOD PRESSURE: 64 MMHG | OXYGEN SATURATION: 95 % | BODY MASS INDEX: 32.27 KG/M2 | TEMPERATURE: 97.5 F | TEMPERATURE: 98.4 F | HEART RATE: 78 BPM | WEIGHT: 219 LBS | DIASTOLIC BLOOD PRESSURE: 69 MMHG | BODY MASS INDEX: 31.87 KG/M2

## 2021-05-28 NOTE — PROGRESS NOTES
Patient: KENAN PAULINO     Acct: [id number]     Report: #[id number]  UNIT #: [id number]     : 1945    Encounter Date:2019  PRIMARY CARE: ROBLES CHACON  ***Signed***  --------------------------------------------------------------------------------------------------------------------  Progress Note      DATE: 19      Primary Care Provider:  ROBLES CHACON      Referring Provider:  ROBLES CHACON      Chief Complaint      FU Anemia            Subjective      74-year-old white male was referred because of anemia.   Initial work-up on him     revealed iron deficiency anemia and he was started on iron replacement.  It is     to be noted patient also has chronic kidney disease stage III secondary to his     diabetes mellitus which also cause anemia.            Past Med/Surg History            Past Med/Surg History:   Hypertension             Diabetes Mellitus (2)             No Heart Disease             No Blood Clots             Cancer (Skin)             Kidney Disease (Diabetic CKD, Stage III)             Other (Mixed Hyperlipidemia, Arthritis, Asthma, Kidney stones)            Social History      Social History:  No Tobacco Use (Former smoker for 30 years, quit ); Alcohol    Use (Occ); No Recreational Drug use            Allergies      Coded Allergies:             IODINATED CONTRAST MEDIA (Verified  Allergy, Unknown, RASH, 17)           LATEX (Verified  Allergy, Unknown, RASH , 17)           STATINS-HMG-COA REDUCTASE INHIBITOR (Verified  Adverse Reaction, Unknown,     FEELS BAD, 17)            Medications      Medications    Last Reconciled on 19 20:17 by HERMES PARRISH MD      Cyanocobalamin (Vitamin B-12*) 1,000 Mcg Tablet      1000 MCG PO QDAY for 90 Days, #90 TAB 3 Refills         Prov: Veza,Forgan         10/2/19       Ferrous Sulfate (Ferrous Sulfate*) 325 Mg Tablet      325 MG PO TID for 90 Days, #270 TAB 3 Refills         Prov: Veza,Lia         10/2/19        Multivitamin (Multivitamins) 1 Each Capsule      1 EACH PO QDAY, CAP         Reported         4/20/18       Albuterol (Proair HFA) 8.5 Gm Inh      2 PUFFS INH RTQ4H PRN for SHORTNESS OF BREATH, #1 INH 0 Refills         Reported         4/20/18       Bayron-Fluticasone (Fluticasone 50 mcg) 16 Gm Spray.susp      2 PUFFS NARE EACH QDAY, #1 BOTTLE 0 Refills         Reported         4/20/18       Pravastatin Sodium (Pravachol) 20 Mg Tablet      20 MG PO HS, TAB         Reported         4/20/18       Finasteride (Proscar*) 5 Mg Tablet      5 MG PO QDAY, #30 TAB 0 Refills         Reported         4/20/18       Carvedilol (Carvedilol) 12.5 Mg Tablet      12.5 MG PO BID, #60 TAB 0 Refills         Reported         4/20/18       Doxazosin Mesylate (Doxazosin Mesylate) 2 Mg Tablet      4 MG PO BID, TAB         Reported         4/20/18       NIFEdipine ER (Adalat CC) 90 Mg Tablet.er      90 MG PO QDAY, #30 TAB.SR 0 Refills         Reported         4/20/18       Vitamin E (Vitamin E*) 400 Units Capsule      400 UNITS PO BID, CAP         Reported         1/26/17       Ascorbic Acid (Vitamin C*) 1,000 Mg Tablet      1000 MG PO BID, TAB         Reported         1/26/17       Cholecalciferol (Vitamin D3) (Vitamin D3) 1,000 Unit Tab      1000 UNITS PO BID, #60 TAB 0 Refills         Reported         1/26/17       Candesartan Cilexetil (ATACAND) 32 Mg Tablet      32 MG PO QDAY@20, #30 TAB         Reported         1/26/17       Dicyclomine Hcl (DICYCLOMINE HCL) 20 Mg Tablet      20 MG PO BID, TAB         Reported         12/12/14       Omeprazole (PriLOSEC*) 20 Mg Capcr      20 MG PO QDAY, CAP         Reported         12/5/12       Fenofibrate (Tricor*) 145 Mg Tablet      145 MG PO QDAY, TAB         Reported         11/8/12       Tolterodine Tartrate (Detrol LA) 4 Mg Cap.sr.24h      4 MG PO QDAY@20, CAP         Reported         11/8/12       Metformin HCl (Glucophage) 1,000 Mg Tablet      1000 MG PO BID, TAB         Reported          11/8/12       Cetirizine Hcl (ZyrTEC) 10 Mg Tablet      10 MG PO BID         Reported         11/8/12       Nitroglycerin (Nitroglycerin) 0.4 Mg Tablet      0.4 MG SL PRN, BOTTLE 0 Refills         Reported         4/7/09       Fluticasone/Salmeterol (Advair 250/50 Diskus*) 28 Puff/Inh Inh      1 PUFF IH BID, 0 Refills         Reported         4/7/09       Montelukast Sodium (Singulair*) 10 Mg Tab      10 MG PO HS, TAB 0 Refills         Reported         4/7/09      Current Medications      Current Medications Reviewed 12/4/19            Pain Assessment      Pain Intensity:  2      Description:  Aching, Localized      Duration:  Intermittent            Review of Systems      General:  No Anxiety; Fatigue Scale: (0), Pain Scale: (2)      HEENT:  No Dysphagia      Respiratory:  No Cough      Cardiovascular:  No Chest Pain      Gastrointestinal:  No Nausea, No Vomiting; Diarrhea, Appetite Good (Good)      Genitourinary:  No Nocturia      Musculoskeletal:  No Joint Effusions; Aches, Pains (Head)      Hematologic:  No Bleeding, No Bruising      Allergic/Immunologic:  No Hives      Psychological:  No Anxiety      Neurological:  Headaches; No Dizziness; Numbness (Hands, Feet)      Skin:  No Rash, No Open Wounds            Vitals      Height 5 ft 10 in / 177.8 cm      Weight 225 lbs 6.4 oz / 102.569187 kg      BSA 2.20 m2      BMI 32.3 kg/m2      Temperature 98.1 F / 36.72 C      Pulse 78      Blood Pressure 152/69      Pulse Oximetry 91%            Exam      Constitutional:  No acute distress, Conversant, Pleasant, Other (Bandage on the     right side of the head from his recent Mohs surgery)      Eyes:  Anicteric sclerae, Palpebral Conjunctivae (Pink), DEVIN      HENT:  Oropharynx clear; No Erythema; Buccal mucosae (Pink)      Neck:  Supple, Full Range of Motion      Cardiovascular:  RRR; No Murmurs; Normal PMI; No Peripheral Edema      Lungs:  Clear to Ausculation, Normal Respiratory Effort      Abdomen:  Soft, NABS; No  Masses, No Tenderness      Chest:  Other (Symmetrical and equal)      Lymphatic:  Neck      Extremities:  No digital cyanosis, No digital ischemia, Normal gait, Other (No     deformity)      Neurological:  Cranial Nerve II-XII (Intact); No Focal Sensory deficits      Psychological:  Appropriate affect, Appropriate mood, Intact judgement, Alert      Skin:  Other (No dermatosis)            Lab Results      Laboratory Tests      11/26/19 10:58            Laboratory Tests            Test       8/20/19      10:22 9/16/19      14:27 11/26/19      10:58 11/28/19      21:56             Urine Random Microalbumin       117.1 mg/L      (0.0-20.0)                           Urine Creatinine       32.0 mg/dL      (10.0-300.0)                           Urine Microalbumin/Creatinine      Ratio 365.9      mg/g{Cre}                           Triglycerides Level       210 mg/dL      ()                           Cholesterol Level       169 mg/dL      (107-200)                           LDL Cholesterol       97 mg/dL      ()                           VLDL Cholesterol       42 mg/dL      (5-37)                           HDL Cholesterol       30 mg/dL      (40-60)                           Cholesterol/HDL Ratio       5.6 NA      (3.0-6.0)                           Absolute Reticulocyte Count              0.0695 10*6/uL      (0.0260-0.0950)                           Percent Reticulocyte Count              1.81 %      (0.51-1.81)                           Erythropoietin              8.8 mIU/mL      (2.6-18.5)                           Ferritin              237 ng/mL      ()                           Total Bilirubin              <0.15 mg/dL      (0.20-1.30)                           Aspartate Amino Transf      (AST/SGOT)        20 U/L (15-50)                           Alanine Aminotransferase      (ALT/SGPT)        20 U/L (10-40)                           Alkaline Phosphatase              38 U/L      ()                            Serum Total Protein              6.2 g/dL      (6.0-8.5)                           Total Protein              6.6 g/dL      (6.3-8.2)                           Albumin              3.9 g/dL      (2.9-4.4)                           Globulin              2.3 g/dL      (2.2-3.9)                           Albumin/Globulin Ratio              1.7 NA      (0.7-1.7)                           Alpha-1-Globulins              0.2 g/dL      (0.0-0.4)                           Alpha-2-Globulins              0.7 g/dL      (0.4-1.0)                           Beta Globulins              1.0 g/dL      (0.7-1.3)                           Gamma Globulins              0.4 g/dL      (0.4-1.8)                           Protein Electrophoresis Note  Comment NA                Protein Electrophoresis      Interpret        Comment NA                           Thyroid Stimulating Hormone      (TSH)        1.760 m(iU)/L      (0.270-4.200)                           Free Thyroxine              1.1 ng/dL      (0.9-1.8)                           Immunoglobulin G              520 mg/dL      (700-1600)                           Immunoglobulin A              124 mg/dL      ()                           Immunoglobulin M              23 mg/dL      ()                           Serum Immunofixation  Comment NA                M-Erich (LA)              Not Observed      g/dL (Not                           Free Kappa Light Chains              36.0 mg/L      (3.3-19.4)                           Free Lambda Light Chains              19.7 mg/L      (5.7-26.3)                           Free Kappa/Lambda Light Chain      Ratio        1.83 NA      (0.26-1.65)                           White Blood Count              7.45 10*3/uL      (4.80-10.80)                    Red Blood Count              4.10 10*6/uL      (4.70-6.10)                    Hemoglobin              12.2 g/dL      (14.0-18.0)                    Hematocrit              38.1 %       (42.0-52.0)                    Mean Corpuscular Volume              92.9 fL      (80.0-96.0)                    Mean Corpuscular Hemoglobin              29.8 pg      (27.0-31.0)                    Mean Corpuscular Hemoglobin      Concent               32.0      (33.0-37.0)                    Red Cell Distribution Width              12.8 %      (11.6-14.4)                    RDW Standard Deviation              43.7 fL      (35.1-43.9)                    Platelet Count              237 10*3/uL      (130-400)                    Mean Platelet Volume              11.2 fL      (9.4-12.4)                    Granulocytes (%)              56.7 %      (30.0-85.0)                    Granulocytes #              4.22 10*3/uL      (2.00-8.00)                    Lymphocytes # (Auto)              2.44 10*3/uL      (1.00-5.00)                    Monocytes # (Auto)              0.45 10*3/uL      (0.20-1.20)                    Eosinophils # (Auto)              0.26 10*3/uL      (0.00-0.70)                    Basophils # (Auto)              0.07 10*3/uL      (0.00-0.20)                    Immature Granulocytes %              0.1 %      (0.0-1.8)                    Lymphocytes %              32.8 %      (20.0-45.0)                    Monocytes %              6.0 %      (3.0-10.0)                    Eosinophils %              3.5 %      (0.0-7.0)                    Basophils %              0.9 %      (0.0-3.0)                    Nucleated Red Blood Cells %              0.00 %      (0.00-0.70)                    Immature Granulocytes #              0.01 10*3/uL      (0.00-0.20)                    Sodium Level              140 mmol/L      (135-147)                    Potassium Level              4.6 mmol/L      (3.5-5.3)                    Chloride Level              104 mmol/L      ()                    Carbon Dioxide Level              23 mmol/L      (22-32)                    Anion Gap              18 mmol/L      (8-19)                     Blood Urea Nitrogen              26 mg/dL      (5-25)                    Creatinine              1.37 mg/dL      (0.70-1.20)                    Glomerular Filtration Rate      Calc               51      mL/min/{1.73_m2}                    BUN/Creatinine Ratio              19 {ratio}      (6-20)                    Glucose Level              111 mg/dL      (70-99)                    Hemoglobin A1c              6.6 %      (3.5-5.7)                    Estimated Average Glucose      (eAG)               143 mg/dL                           Serum Osmolality   295 (273-304)               Calcium Level              9.9 mg/dL      (8.7-10.4)                    Iron Level              95 ug/dL      ()                    Total Iron Binding Capacity              466 ug/dL      (245-450)                    Percent Iron Saturation   20 % (20-55)               Transferrin              326.00 mg/dL      (215..00)                    Vitamin B12 Level              871 pg/mL      (211-911)                    Folate              18.4 ng/mL      (4.8-20.0)                    Lab Scanned Report              LAB FINAL      CUMULATIVES -            Impression/Problem List      Normochromic normocytic anemia may be secondary to chronic kidney disease and     iron deficiency      Diabetes mellitus      Hyperlipidemia      Chronic kidney disease stage 3      Hypertension      Skin cancer      Arthritis      Chronic diarrhea may be secondary to metformin      Low immunoglobulin G      Problems:        (1) Diabetes mellitus      Qualifiers:                 (2) Iron deficiency anemia      Qualifiers:           Qualified Codes:  D50.0 - Iron deficiency anemia secondary to blood loss     (chronic)      (3) Hypertension      Qualifiers:           Qualified Codes:  I10 - Essential (primary) hypertension      (4) Anemia in chronic kidney disease      Qualifiers:           Qualified Codes:  N18.3 - Chronic kidney disease, stage 3  (moderate); D63.1 -     Anemia in chronic kidney disease            Plan      Continue ferrous sulfate 1 tablet 2 times a day      B12 thousand micrograms once a day      Repeat blood count in 4 months.  There is no normalization of hemoglobin     hematocrit anticipate doing bone marrow studies.  I have discussed the procedure     with the patient.            Patient Education:        Anemia            PREVENTION      Hx Influenza Vaccination:  Yes      Date Influenza Vaccine Given:  Oct 1, 2019      Influenza Vaccine Declined:  No      2 or More Falls Past Year?:  No      Fall Past Year with Injury?:  No      Chart initiated by      AKIRA Duggan MA            Electronically signed by Lia Arguello  12/04/2019 20:17       Disclaimer: Converted document may not contain table formatting or lab diagrams. Please see [facility] for the authenticated document.

## 2021-05-28 NOTE — PROGRESS NOTES
Patient: KENAN PAULINO     Acct: [id number]     Report: #[id number]  UNIT #: [id number]     : 1945    Encounter Date:2020  PRIMARY CARE: ROBLSE CHACON  ***Signed***  --------------------------------------------------------------------------------------------------------------------  Progress Note      DATE: 20      Primary Care Provider:  ROBLES CHACON      Referring Provider:  ROBLES CHACON      Chief Complaint      FU Anemia            Subjective      74-year-old white male was referred initially because of anemia.  Work-ups     revealed the patient has iron deficiency anemia as well as chronic kidney diseas    e stage III.  Patient was put on ferrous sulfate 2025 mg 3 times a day but the     patient could only take 2 a day.            Patient complains of numbness of the left arm and left leg probably secondary to    diabetic neuropathy.            Past Med/Surg History            Past Med/Surg History:   Hypertension             Diabetes Mellitus (2)             No Heart Disease             No Blood Clots             Cancer (Skin)             Kidney Disease (Diabetic CKD, Stage III)             Other (Mixed Hyperlipidemia, Arthritis, Asthma, Kidney stones)            Social History      Social History:  No Tobacco Use (Former smoker for 30 years, quit ); Alcohol    Use (Occ); No Recreational Drug use            Allergies      Coded Allergies:             IODINATED CONTRAST MEDIA (Verified  Allergy, Unknown, RASH, 17)           LATEX (Verified  Allergy, Unknown, RASH , 17)           STATINS-HMG-COA REDUCTASE INHIBITOR (Verified  Adverse Reaction, Unknown,     FEELS BAD, 17)            Medications      Medications    Last Reconciled on 20 18:58 by HERMES PARRISH MD      Loratadine (Claritin) 10 Mg Tablet      10 MG PO QDAY, #30 TAB 0 Refills         Reported         20       Ferrous Sulfate (Ferrous Sulfate*) 325 Mg Tablet      325 MG PO BID for 90 Days, #180 TAB  3 Refills         Prov: David Arguelloazon         6/16/20       Cyanocobalamin (Vitamin B-12*) 1,000 Mcg Tablet      1000 MCG PO QDAY for 90 Days, #90 TAB 3 Refills         Prov: JosDelmita         10/2/19       Multivitamin (Multivitamins) 1 Each Capsule      1 EACH PO QDAY, CAP         Reported         4/20/18       Albuterol (Proair HFA) 8.5 Gm Inh      2 PUFFS INH RTQ4H PRN for SHORTNESS OF BREATH, #1 INH 0 Refills         Reported         4/20/18       Bayron-Fluticasone (Fluticasone 50 mcg) 16 Gm Spray.susp      2 PUFFS NARE EACH QDAY, #1 BOTTLE 0 Refills         Reported         4/20/18       Pravastatin Sodium (Pravachol) 20 Mg Tablet      20 MG PO HS, TAB         Reported         4/20/18       Finasteride (Proscar*) 5 Mg Tablet      5 MG PO QDAY, #30 TAB 0 Refills         Reported         4/20/18       Carvedilol (Carvedilol) 12.5 Mg Tablet      12.5 MG PO BID, #60 TAB 0 Refills         Reported         4/20/18       Doxazosin Mesylate (Doxazosin Mesylate) 2 Mg Tablet      4 MG PO BID, TAB         Reported         4/20/18       NIFEdipine ER (Adalat CC) 90 Mg Tablet.er      90 MG PO QDAY, #30 TAB.SR 0 Refills         Reported         4/20/18       Vitamin E (Vitamin E*) 400 Units Capsule      400 UNITS PO BID, CAP         Reported         1/26/17       Ascorbic Acid (Vitamin C*) 1,000 Mg Tablet      1000 MG PO BID, TAB         Reported         1/26/17       Cholecalciferol (Vitamin D3) (Vitamin D3) 1,000 Unit Tab      1000 UNITS PO BID, #60 TAB 0 Refills         Reported         1/26/17       Candesartan Cilexetil (ATACAND) 32 Mg Tablet      32 MG PO QDAY@20, #30 TAB         Reported         1/26/17       Dicyclomine Hcl (DICYCLOMINE HCL) 20 Mg Tablet      20 MG PO BID, TAB         Reported         12/12/14       Omeprazole (priLOSEC) 20 Mg Capcr      20 MG PO QDAY, CAP         Reported         12/5/12       Fenofibrate (Tricor*) 145 Mg Tablet      145 MG PO QDAY, TAB         Reported         11/8/12        Tolterodine Tartrate (Detrol LA) 4 Mg Cap.sr.24h      4 MG PO QDAY@20, CAP         Reported         11/8/12       metFORMIN HCl (Glucophage) 1,000 Mg Tablet      1000 MG PO BID, TAB         Reported         11/8/12       Nitroglycerin (Nitroglycerin) 0.4 Mg Tablet      0.4 MG SL PRN, BOTTLE 0 Refills         Reported         4/7/09       Fluticasone/Salmeterol (Advair 250/50 Diskus*) 28 Puff/Inh Inh      1 PUFF IH BID, 0 Refills         Reported         4/7/09       Montelukast Sodium (Singulair*) 10 Mg Tab      10 MG PO HS, TAB 0 Refills         Reported         4/7/09      Current Medications      Current Medications Reviewed 6/16/20            Pain Assessment      Pain Intensity:  0      Description:  None            Review of Systems      General:  No Anxiety; Fatigue Scale: (0), Pain Scale: (0)      HEENT:  No Dysphagia      Respiratory:  No Cough, No Shortness of Air      Cardiovascular:  No Chest Pain      Gastrointestinal:  No Nausea; Appetite Good (Good)      Genitourinary:  No Nocturia      Musculoskeletal:  No Joint Effusions, No Joint Tenderness      Endocrine:  No Heat Intolerance      Hematologic:  No Bleeding      Allergic/Immunologic:  No Hives      Psychological:  No Anxiety, No Depression      Neurological:  No Headaches; Weakness, Numbness ((L) arm, (L) leg)      Skin:  No Rash, No Open Wounds            Vitals      Height 5 ft 10 in / 177.8 cm      Weight 221 lbs 0 oz / 100.08499 kg      BSA 2.18 m2      BMI 31.7 kg/m2      Temperature 97.5 F / 36.39 C      Pulse 52      Respirations 18      Blood Pressure 124/96      Pulse Oximetry 95%            Exam      Constitutional:  No acute distress, Conversant, Pleasant      Eyes:  Anicteric sclerae, Palpebral Conjunctivae (Pink), DEVIN      Neck:  Supple      Cardiovascular:  RRR; No Murmurs; Normal PMI; No Peripheral Edema      Lungs:  Clear to Ausculation, Normal Respiratory Effort      Abdomen:  Soft; No Tenderness      Chest:  Other (Symmetrical)       Lymphatic:  No Neck      Extremities:  No digital cyanosis, Normal gait, Other (No deformity)      Neurological:  Cranial Nerve II-XII (Intact); No Focal Sensory deficits      Psychological:  Appropriate affect, Appropriate mood, Intact judgement, Alert      Skin:  Other (No dermatosis)            Lab Results      Laboratory Tests      6/9/20 10:33            Laboratory Tests            Test       3/3/20      10:17 4/2/20      10:36 6/9/20      10:33 6/11/20      21:56             Urine Random Microalbumin       495.6 mg/L      (0.0-20.0)                           Urine Creatinine       63.7 mg/dL      (10.0-300.0)                           Urine Microalbumin/Creatinine      Ratio 778.0      mg/g{Cre}                           Absolute Reticulocyte Count              0.0684 10*6/uL      (0.0260-0.0950)                           Percent Reticulocyte Count              1.84 %      (0.51-1.81)                           White Blood Count              8.12 10*3/uL      (4.80-10.80)                    Red Blood Count              4.32 10*6/uL      (4.70-6.10)                    Hemoglobin              13.0 g/dL      (14.0-18.0)                    Hematocrit              39.6 %      (42.0-52.0)                    Mean Corpuscular Volume              91.7 fL      (80.0-96.0)                    Mean Corpuscular Hemoglobin              30.1 pg      (27.0-31.0)                    Mean Corpuscular Hemoglobin      Concent               32.8      (33.0-37.0)                    Red Cell Distribution Width              12.5 %      (11.6-14.4)                    RDW Standard Deviation              41.8 fL      (35.1-43.9)                    Platelet Count              223 10*3/uL      (130-400)                    Mean Platelet Volume              11.6 fL      (9.4-12.4)                    Granulocytes (%)              63.2 %      (30.0-85.0)                    Granulocytes #              5.13 10*3/uL      (2.00-8.00)                     Lymphocytes # (Auto)              2.16 10*3/uL      (1.00-5.00)                    Monocytes # (Auto)              0.53 10*3/uL      (0.20-1.20)                    Eosinophils # (Auto)              0.22 10*3/uL      (0.00-0.70)                    Basophils # (Auto)              0.05 10*3/uL      (0.00-0.20)                    Immature Granulocytes %              0.4 %      (0.0-1.8)                    Lymphocytes %              26.6 %      (20.0-45.0)                    Monocytes %              6.5 %      (3.0-10.0)                    Eosinophils %              2.7 %      (0.0-7.0)                    Basophils %              0.6 %      (0.0-3.0)                    Nucleated Red Blood Cells %              0.00 %      (0.00-0.70)                    Immature Granulocytes #              0.03 10*3/uL      (0.00-0.20)                    Sodium Level              135 mmol/L      (135-147)                    Potassium Level              4.9 mmol/L      (3.5-5.3)                    Chloride Level              103 mmol/L      ()                    Carbon Dioxide Level              19 mmol/L      (22-32)                    Anion Gap              18 mmol/L      (8-19)                    Blood Urea Nitrogen              30 mg/dL      (5-25)                    Creatinine              1.14 mg/dL      (0.70-1.20)                    Glomerular Filtration Rate      Calc               >60      mL/min/{1.73_m2}                    BUN/Creatinine Ratio              26 {ratio}      (6-20)                    Glucose Level              131 mg/dL      (70-99)                    Hemoglobin A1c              6.5 %      (3.5-5.7)                    Estimated Average Glucose      (eAG)               140 mg/dL                           Serum Osmolality   288 (273-304)               Calcium Level              9.6 mg/dL      (8.7-10.4)                    Iron Level              136 ug/dL      ()                    Total Iron  Binding Capacity              325 ug/dL      (245-450)                    Percent Iron Saturation   42 % (20-55)               Transferrin              227.00 mg/dL      (215..00)                    Total Bilirubin              0.45 mg/dL      (0.20-1.30)                    Aspartate Amino Transf      (AST/SGOT)               18 U/L (15-50)                           Alanine Aminotransferase      (ALT/SGPT)               20 U/L (10-40)                           Alkaline Phosphatase              40 U/L      ()                    Total Creatine Kinase              182 U/L      ()                    Total Protein              6.7 g/dL      (6.3-8.2)                    Albumin              4.3 g/dL      (3.5-5.0)                    Globulin              2.4 g/dL      (2.0-3.5)                    Albumin/Globulin Ratio              1.8 {ratio}      (1.4-2.6)                    Triglycerides Level              291 mg/dL      ()                    Cholesterol Level              160 mg/dL      (107-200)                    LDL Cholesterol              72 mg/dL      ()                    VLDL Cholesterol              58 mg/dL      (5-37)                    HDL Cholesterol              30 mg/dL      (40-60)                    Cholesterol/HDL Ratio              5.3 NA      (3.0-6.0)                    Vitamin B12 Level              923 pg/mL      (211-911)                    Folate              >20.0 ng/mL      (4.8-20.0)                    Lab Scanned Report              LAB FINAL      CUMULATIVES -            Impression/Problem List      Normochromic normocytic anemia may be secondary to chronic kidney disease and     iron deficiency present blood test showed an increase in hemoglobin by 2 grams      Diabetes mellitus      Hyperlipidemia      Chronic kidney disease stage 3      Hypertension      Skin cancer      Arthritis      Chronic diarrhea may be secondary to metformin      Low immunoglobulin  G      Notes      New Medications      * Loratadine (Claritin) 10 MG TABLET: 10 MG PO QDAY #30      Changed Medications      * Ferrous Sulfate (Ferrous Sulfate*) 325 MG TABLET:         From: 325 MG PO TID 90 Days #270         To: 325 MG PO BID 90 Days #180      Problems:        (1) Diabetes mellitus      Qualifiers:                 (2) Iron deficiency anemia      Qualifiers:           Qualified Codes:  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            Patient Education:        Anemia            PREVENTION      Hx Influenza Vaccination:  Yes      Date Influenza Vaccine Given:  Oct 1, 2019      Influenza Vaccine Declined:  No      2 or More Falls Past Year?:  No      Fall Past Year with Injury?:  No      Chart initiated by      AKIRA Calzada MA            Electronically signed by Lia Arguello  06/16/2020 18:58       Disclaimer: Converted document may not contain table formatting or lab diagrams. Please see [facility] for the authenticated document.

## 2021-05-28 NOTE — PROGRESS NOTES
Patient: KENAN PAULINO     Acct: [id number]     Report: #[id number]  UNIT #: [id number]     : 1945    Encounter Date:10/02/2019  PRIMARY CARE: ROBLES CHACON  ***Signed***  --------------------------------------------------------------------------------------------------------------------  Progress Note      DATE: 10/2/19      Primary Care Provider:  ROBLES CHACON      Referring Provider:  ROBLES CHACON      Chief Complaint      FU Anemia            Subjective      74-year-old white male was referred because of anemia.  He has history of     bleeding peptic ulcer 5 years ago for which he got 3 to 4 units of blood.      Patient has been taking iron supplement once a day.  Patient is here with his     wife to get reports of his work-up.            Past Med/Surg History            Past Med/Surg History:   Hypertension             Diabetes Mellitus (2)             No Heart Disease             No Blood Clots             Cancer (Skin)             Kidney Disease (Diabetic CKD, Stage III)             Other (Mixed Hyperlipidemia, Arthritis, Asthma, Kidney stones)            Social History      Social History:  No Tobacco Use (Former smoker for 30 years, quit ); Alcohol    Use (Occ); No Recreational Drug use            Allergies      Coded Allergies:             IODINATED CONTRAST MEDIA (Verified  Allergy, Unknown, RASH, 17)           LATEX (Verified  Allergy, Unknown, RASH , 17)           STATINS-HMG-COA REDUCTASE INHIBITOR (Verified  Adverse Reaction, Unknown,     FEELS BAD, 17)            Medications      Medications    Last Reconciled on 10/2/19 16:54 by HERMES ARGUELLO MD      Cyanocobalamin (Vitamin B-12*) 1,000 Mcg Tablet      1000 MCG PO QDAY for 90 Days, #90 TAB 3 Refills         Prov: David Arguelloazon         10/2/19       Ferrous Sulfate (Ferrous Sulfate*) 325 Mg Tablet      325 MG PO TID for 90 Days, #270 TAB 3 Refills         Prov: Lia Arguello         10/2/19       Gabapentin  (Gabapentin) 100 Mg Capsule      100 MG PO HS, #30 CAP 0 Refills         Reported         10/2/19       (Fish Oil)   No Conflict Check      1200 MG PO QDAY@21         Reported         4/20/18       Multivitamin (Multivitamins) 1 Each Capsule      1 EACH PO QDAY, CAP         Reported         4/20/18       Albuterol (Proair HFA) 8.5 Gm Inh      2 PUFFS INH RTQ4H PRN for SHORTNESS OF BREATH, #1 INH 0 Refills         Reported         4/20/18       Bayron-Fluticasone (Fluticasone 50 mcg) 16 Gm Spray.susp      2 PUFFS NARE EACH QDAY, #1 BOTTLE 0 Refills         Reported         4/20/18       Pravastatin Sod (Pravachol*) 20 Mg Tablet      20 MG PO HS, TAB         Reported         4/20/18       Finasteride (Proscar*) 5 Mg Tablet      5 MG PO QDAY, #30 TAB 0 Refills         Reported         4/20/18       Carvedilol (Carvedilol) 12.5 Mg Tablet      12.5 MG PO BID, #60 TAB 0 Refills         Reported         4/20/18       Doxazosin Mesylate (Doxazosin Mesylate) 2 Mg Tablet      4 MG PO BID, TAB         Reported         4/20/18       NIFEdipine ER (Adalat CC) 90 Mg Tablet.er      90 MG PO QDAY, #30 TAB.SR 0 Refills         Reported         4/20/18       Vitamin E (Vitamin E*) 400 Units Capsule      400 UNITS PO BID, CAP         Reported         1/26/17       Ascorbic Acid (Vitamin C*) 1,000 Mg Tablet      1000 MG PO BID, TAB         Reported         1/26/17       Ferrous Sulfate (Iron Sulfate*) 325 Mg Tablet      325 MG PO BID, #60 TAB 0 Refills         Reported         1/26/17       Cholecalciferol (Vitamin D3*) 1,000 Unit Tab      1000 UNITS PO BID, #60 TAB 0 Refills         Reported         1/26/17       Candesartan Cilexetil (ATACAND) 32 Mg Tablet      32 MG PO QDAY@20, #30 TAB         Reported         1/26/17       Dicyclomine Hcl (DICYCLOMINE HCL) 20 Mg Tablet      20 MG PO BID, TAB         Reported         12/12/14       Omeprazole (PriLOSEC*) 20 Mg Capcr      20 MG PO QDAY, CAP         Reported         12/5/12        Fenofibrate (Tricor*) 145 Mg Tablet      145 MG PO QDAY, TAB         Reported         11/8/12       Tolterodine Tartrate (Detrol LA) 4 Mg Cap.sr.24h      4 MG PO QDAY@20, CAP         Reported         11/8/12       Metformin HCl (Glucophage) 1,000 Mg Tablet      1000 MG PO BID, TAB         Reported         11/8/12       Cetirizine Hcl (ZyrTEC) 10 Mg Tablet      10 MG PO BID         Reported         11/8/12       Nitroglycerin (Nitroglycerin) 0.4 Mg Tablet      0.4 MG SL PRN, BOTTLE 0 Refills         Reported         4/7/09       Fluticasone/Salmeterol (Advair 250/50 Diskus*) 28 Puff/Inh Inh      1 PUFF IH BID, 0 Refills         Reported         4/7/09       Montelukast Sodium (Singulair*) 10 Mg Tab      10 MG PO HS, TAB 0 Refills         Reported         4/7/09      Current Medications      Current Medications Reviewed 10/2/19            Review of Systems      General:  No Anxiety; Fatigue Scale: (7), Pain Scale: (0)      HEENT:  No Dysphagia, No Hearing Changes      Respiratory:  Cough (Dry); No Shortness of Air      Cardiovascular:  Chest Pain; No Pedal Edema      Gastrointestinal:  No Nausea, No Vomiting; Diarrhea, Appetite Good (Good)      Genitourinary:  No Nocturia      Musculoskeletal:  No Joint Effusions, No Joint Tenderness      Endocrine:  No Heat Intolerance      Hematologic:  No Bleeding, No Bruising      Allergic/Immunologic:  No Hives      Psychological:  No Anxiety, No Depression      Neurological:  Headaches (Occassionally); No Dizziness; Numbness (Fingers, (L)     Leg)      Skin:  No Rash            Vitals      Height 5 ft 10 in / 177.8 cm      Weight 222 lbs 9.6 oz / 100.891917 kg      BSA 2.18 m2      BMI 31.9 kg/m2      Temperature 98.1 F / 36.72 C      Pulse 64      Respirations 12      Blood Pressure 152/74      Pulse Oximetry 94%            Exam      Constitutional:  No acute distress, Conversant, Pleasant      Eyes:  Anicteric sclerae, Palpebral Conjunctivae (Pink), DEVIN      HENT:   Oropharynx clear; No Erythema; Buccal mucosae (Pink)      Neck:  Supple, Full Range of Motion      Cardiovascular:  RRR; No Murmurs; Normal PMI; No Peripheral Edema      Lungs:  Clear to Ausculation, Normal Respiratory Effort      Chest:  Other (Symmetric)      Extremities:  No digital cyanosis, No digital ischemia, Normal gait, Other (No     deformity)      Neurological:  Cranial Nerve II-XII (Intact); No Focal Sensory deficits      Psychological:  Appropriate affect, Appropriate mood, Intact judgement, Alert      Skin:  Other (No dermatosis)            Lab Results      Laboratory Tests      9/16/19 14:27            Laboratory Tests            Test       8/20/19      10:22 9/16/19      14:27 9/19/19      21:57             Urine Random Microalbumin       117.1 mg/L      (0.0-20.0)                           Urine Creatinine       32.0 mg/dL      (10.0-300.0)                           Urine Microalbumin/Creatinine      Ratio 365.9      mg/g{Cre}                           Hemoglobin A1c       6.3 %      (3.5-5.7)                           Estimated Average Glucose      (eAG) 134 mg/dL                           Triglycerides Level       210 mg/dL      ()                           Cholesterol Level       169 mg/dL      (107-200)                           LDL Cholesterol       97 mg/dL      ()                           VLDL Cholesterol       42 mg/dL      (5-37)                           HDL Cholesterol       30 mg/dL      (40-60)                           Cholesterol/HDL Ratio       5.6 NA      (3.0-6.0)                           White Blood Count              8.10 10*3/uL      (4.80-10.80)                    Red Blood Count              3.84 10*6/uL      (4.70-6.10)                    Hemoglobin              11.5 g/dL      (14.0-18.0)                    Hematocrit              37.5 %      (42.0-52.0)                    Mean Corpuscular Volume              97.7 fL      (80.0-96.0)                    Mean  Corpuscular Hemoglobin              29.9 pg      (27.0-31.0)                    Mean Corpuscular Hemoglobin      Concent        30.7      (33.0-37.0)                    Red Cell Distribution Width              13.2 %      (11.6-14.4)                    RDW Standard Deviation              47.5 fL      (35.1-43.9)                    Platelet Count              255 10*3/uL      (130-400)                    Mean Platelet Volume              10.5 fL      (9.4-12.4)                    Granulocytes (%)              59.0 %      (30.0-85.0)                    Granulocytes #              4.77 10*3/uL      (2.00-8.00)                    Lymphocytes # (Auto)              2.47 10*3/uL      (1.00-5.00)                    Monocytes # (Auto)              0.57 10*3/uL      (0.20-1.20)                    Eosinophils # (Auto)              0.21 10*3/uL      (0.00-0.70)                    Basophils # (Auto)              0.06 10*3/uL      (0.00-0.20)                    Immature Granulocytes %              0.2 %      (0.0-1.8)                    Lymphocytes %              30.5 %      (20.0-45.0)                    Monocytes %              7.0 %      (3.0-10.0)                    Eosinophils %              2.6 %      (0.0-7.0)                    Basophils %              0.7 %      (0.0-3.0)                    Nucleated Red Blood Cells %              0.00 %      (0.00-0.70)                    Immature Granulocytes #              0.02 10*3/uL      (0.00-0.20)                    Absolute Reticulocyte Count              0.0695 10*6/uL      (0.0260-0.0950)                    Percent Reticulocyte Count              1.81 %      (0.51-1.81)                    Sodium Level              142 mmol/L      (135-147)                    Potassium Level              4.4 mmol/L      (3.5-5.3)                    Chloride Level              108 mmol/L      ()                    Carbon Dioxide Level              19 mmol/L      (22-32)                     Anion Gap              19 mmol/L      (8-19)                    Blood Urea Nitrogen              31 mg/dL      (5-25)                    Creatinine              1.44 mg/dL      (0.70-1.20)                    Glomerular Filtration Rate      Calc        48      mL/min/{1.73_m2}                    BUN/Creatinine Ratio              22 {ratio}      (6-20)                    Glucose Level              117 mg/dL      (70-99)                    Serum Osmolality  302 (273-304)               Calcium Level              9.7 mg/dL      (8.7-10.4)                    Iron Level              54 ug/dL      ()                    Total Iron Binding Capacity              396 ug/dL      (245-450)                    Percent Iron Saturation  14 % (20-55)               Transferrin              277.00 mg/dL      (215..00)                    Erythropoietin              8.8 mIU/mL      (2.6-18.5)                    Ferritin              237 ng/mL      ()                    Total Bilirubin              <0.15 mg/dL      (0.20-1.30)                    Aspartate Amino Transf      (AST/SGOT)        20 U/L (15-50)                           Alanine Aminotransferase      (ALT/SGPT)        20 U/L (10-40)                           Alkaline Phosphatase              38 U/L      ()                    Serum Total Protein              6.2 g/dL      (6.0-8.5)                    Total Protein              6.6 g/dL      (6.3-8.2)                    Albumin              3.9 g/dL      (2.9-4.4)                    Globulin              2.3 g/dL      (2.2-3.9)                    Albumin/Globulin Ratio              1.7 NA      (0.7-1.7)                    Alpha-1-Globulins              0.2 g/dL      (0.0-0.4)                    Alpha-2-Globulins              0.7 g/dL      (0.4-1.0)                    Beta Globulins              1.0 g/dL      (0.7-1.3)                    Gamma Globulins              0.4 g/dL      (0.4-1.8)                     Protein Electrophoresis Note  Comment NA               Protein Electrophoresis      Interpret        Comment NA                           Vitamin B12 Level              342 pg/mL      (211-911)                    Folate              16.8 ng/mL      (4.8-20.0)                    Thyroid Stimulating Hormone      (TSH)        1.760 m(iU)/L      (0.270-4.200)                    Free Thyroxine              1.1 ng/dL      (0.9-1.8)                    Immunoglobulin G              520 mg/dL      (700-1600)                    Immunoglobulin A              124 mg/dL      ()                    Immunoglobulin M              23 mg/dL      ()                    Serum Immunofixation  Comment NA               M-Erich (LA)              Not Observed      g/dL (Not                    Free Kappa Light Chains              36.0 mg/L      (3.3-19.4)                    Free Lambda Light Chains              19.7 mg/L      (5.7-26.3)                    Free Kappa/Lambda Light Chain      Ratio        1.83 NA      (0.26-1.65)                    Lab Scanned Report              LAB FINAL      CUMULATIVES -            Impression/Problem List      Normochromic normocytic anemia may be secondary to chronic kidney disease and     iron deficiency      Diabetes mellitus      Hyperlipidemia      Chronic kidney disease stage 3      Hypertension      Skin cancer      Arthritis      Chronic diarrhea may be secondary to metformin      Low immunoglobulin G      Iron deficiency anemia - D50.9            Anemia in chronic kidney disease - N18.9, D63.1            Hypertension - I10            Diabetes mellitus - E11.9            Notes      New Medications      * Gabapentin 100 MG CAPSULE: 100 MG PO HS #30      * Ferrous Sulfate (Ferrous Sulfate*) 325 MG TABLET: 325 MG PO TID 90 Days #270      * Cyanocobalamin (Vitamin B-12*) 1,000 MCG TABLET: 1,000 MCG PO QDAY 90 Days #90      Problems:        (1) Diabetes mellitus      Qualifiers:                  (2) Hypertension      Qualifiers:           Qualified Codes:  I10 - Essential (primary) hypertension      (3) Anemia in chronic kidney disease      Qualifiers:           Qualified Codes:  N18.3 - Chronic kidney disease, stage 3 (moderate); D63.1 -     Anemia in chronic kidney disease      (4) Iron deficiency anemia      Qualifiers:           Qualified Codes:  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      Patient's iron is only 54 and iron saturation was only 14 so we will increase     his ferrous sulfate to 3 times a day with vitamin C 500 mg 2 times a day.      Patient may have MARIO for his anemia of chronic kidney disease when he is     hemoglobin drops below 10.      Return to clinic in 6 to 8 weeks with a CBC, iron profile, B12 and folate     levels.      Refer to Dr. Duarte for possible colonoscopy because of iron deficiency anemia     and history of polyps            Carbon Copy:      Copies To 2:   Maged Duarte ;            Patient Education:        Anemia            PREVENTION      2 or More Falls Past Year?:  No      Fall Past Year with Injury?:  No      Chart initiated by      AKIRA Duggan MA                 Disclaimer: Converted document may not contain table formatting or lab diagrams. Please see [facility] for the authenticated document.

## 2021-05-28 NOTE — CONSULTS
Patient: KENAN PAULINO     Acct: [id number]     Report: #[id number]  UNIT #: [id number]     : 1945    Encounter Date:2019  PRIMARY CARE: ROBLES CHACON  ***Signed***  --------------------------------------------------------------------------------------------------------------------  Consult      DATE: 19      Primary Care Provider      ROBLES CHACON            Referring Physician      Referring Provider:  ROBLES CHACON            Reason For Consult      NP Consult- Anemia            History of Present Illness      74-year-old white male was referred because of anemia.  He gives a history of     bleeding peptic ulcer 5 years ago for which he got 3 to 4 units of blood.      Patient has had 2 more endoscopies which showed negative results.  Patient has     been on iron supplementation 1 tablet/day.            Patient history of passing 3 kidney stones last July.  He did not show any     bleeding in his urine.            Past Medical/Surgical History             Hypertension             Diabetes Mellitus (2)             No Heart Disease             No Blood Clots             Cancer (Skin)             Kidney Disease (Diabetic CKD, Stage III)             Other (Mixed Hyperlipidemia, Arthritis, Asthma, Kidney stones)            Pyschiatric History      No Depression, No Anxiety, No Panic Attacks, No Bipolar            Social History      Social History:  No Tobacco Use (Former smoker for 30 years, quit ); Alcohol    Use (Occ); No Recreational Drug use            Family History      Hypertension (Mother, Father); No Diabetes Mellitus, No Heart Disease            Allergies      Coded Allergies:             IODINATED CONTRAST MEDIA (Verified  Allergy, Unknown, RASH, 17)           LATEX (Verified  Allergy, Unknown, RASH , 17)           STATINS-HMG-COA REDUCTASE INHIBITOR (Verified  Adverse Reaction, Unknown,     FEELS BAD, 17)            Medications      (Fish Oil)   No Conflict  Check      1200 MG PO QDAY@21         Reported         4/20/18       Multivitamin (Multivitamins) 1 Each Capsule      1 EACH PO QDAY, CAP         Reported         4/20/18       Albuterol (Proair HFA) 8.5 Gm Inh      2 PUFFS INH RTQ4H PRN for SHORTNESS OF BREATH, #1 INH 0 Refills         Reported         4/20/18       Bayron-Fluticasone (Fluticasone 50 mcg) 16 Gm Spray.susp      2 PUFFS NARE EACH QDAY, #1 BOTTLE 0 Refills         Reported         4/20/18       Pravastatin Sod (Pravachol*) 20 Mg Tablet      20 MG PO HS, TAB         Reported         4/20/18       Finasteride (Proscar*) 5 Mg Tablet      5 MG PO QDAY, #30 TAB 0 Refills         Reported         4/20/18       Carvedilol (Carvedilol) 12.5 Mg Tablet      12.5 MG PO BID, #60 TAB 0 Refills         Reported         4/20/18       Doxazosin Mesylate (Doxazosin Mesylate) 2 Mg Tablet      4 MG PO BID, TAB         Reported         4/20/18       NIFEdipine ER (Adalat CC) 90 Mg Tablet.er      90 MG PO QDAY, #30 TAB.SR 0 Refills         Reported         4/20/18       Vitamin E (Vitamin E*) 400 Units Capsule      400 UNITS PO BID, CAP         Reported         1/26/17       Ascorbic Acid (Vitamin C*) 1,000 Mg Tablet      1000 MG PO BID, TAB         Reported         1/26/17       Ferrous Sulfate (Iron Sulfate*) 325 Mg Tablet      325 MG PO BID, #60 TAB 0 Refills         Reported         1/26/17       Cholecalciferol (Vitamin D3*) 1,000 Unit Tab      1000 UNITS PO BID, #60 TAB 0 Refills         Reported         1/26/17       Candesartan Cilexetil (ATACAND) 32 Mg Tablet      32 MG PO QDAY@20, #30 TAB         Reported         1/26/17       Dicyclomine Hcl (DICYCLOMINE HCL) 20 Mg Tablet      20 MG PO BID, TAB         Reported         12/12/14       Omeprazole (PriLOSEC*) 20 Mg Capcr      20 MG PO QDAY, CAP         Reported         12/5/12       Fenofibrate (Tricor*) 145 Mg Tablet      145 MG PO QDAY, TAB         Reported         11/8/12       Tolterodine Tartrate (Detrol LA) 4  Mg Cap.sr.24h      4 MG PO QDAY@20, CAP         Reported         11/8/12       Metformin HCl (Glucophage) 1,000 Mg Tablet      1000 MG PO BID, TAB         Reported         11/8/12       Cetirizine Hcl (ZyrTEC) 10 Mg Tablet      10 MG PO BID         Reported         11/8/12       Nitroglycerin (Nitroglycerin) 0.4 Mg Tablet      0.4 MG SL PRN, BOTTLE 0 Refills         Reported         4/7/09       Fluticasone/Salmeterol (Advair 250/50 Diskus*) 28 Puff/Inh Inh      1 PUFF IH BID, 0 Refills         Reported         4/7/09       Montelukast Sodium (Singulair*) 10 Mg Tab      10 MG PO HS, TAB 0 Refills         Reported         4/7/09      Current Medications      Current Medications Reviewed 9/16/19            Review of Systems      Abnormal as noted below; all other systems have been reviewed and are negative.      General:  No Anxiety; Fatigue Scale: (8), Pain Scale: (0)      HEENT:  No Dysphagia; Hearing Changes (Decreased hearing), Visual Changes     (Failing Vision)      Respiratory:  Cough (Dry); No Shortness of Air      Cardiovascular:  No Chest Pain, No Pedal Edema      Gastrointestinal:  No Nausea; Diarrhea (Constant), Appetite Good (Good)      Genitourinary:  Nocturia (4x); No Dysuria      Musculoskeletal:  No Joint Effusions, No Joint Tenderness      Endocrine:  No Heat Intolerance      Hematologic:  No Bleeding      Allergic/Immunologic:  No Hives      Psychological:  No Anxiety      Neurological:  No Headaches; Dizziness, Weakness, Numbness (Fingers, Feet)      Skin:  No Rash, No Open Wounds      Vitals:             Height 5 ft 10 in / 177.8 cm           Weight 219 lbs 0 oz / 99.264508 kg           BSA 2.17 m2           BMI 31.4 kg/m2           Temperature 98.4 F / 36.89 C           Pulse 74           Respirations 20           Blood Pressure 141/64           Pulse Oximetry 94%            Exam      Constitutional:  No acute distress, Conversant, Pleasant      Eyes:  Anicteric sclerae, Palpebral Conjunctivae  (Pink), DEVIN      HENT:  Oropharynx clear; No Erythema; Buccal mucosae (Pink)      Neck:  Supple, Full Range of Motion; No Masses      Lungs:  Clear to Ausculation, Normal Respiratory Effort      Cardiovascular:  RRR; No Murmurs; Normal PMI; No Peripheral Edema      Abdomen:  Soft, NABS; No Masses, No Tenderness      Skin:  Normal temperature, Normal tone, Normal texture, Other (No dermatosis)      Extremities:  No digital cyanosis, No digital ischemia, Normal gait, Other (No     deformity)      Psychiatric:  Appropriate affect, Intact judgement      Neurological:  Cranial Nerve II-XII (Intact); No Focal Sensory deficits      Lymphatic:  No Neck, No Axillae            Lab Results                                                                      Laboratory Tests      9/16/19 14:27            Laboratory Tests            Test       9/16/19      14:27             Granulocytes (%)       59.0 %      (30.0-85.0)             Mean Corpuscular Hemoglobin       29.9 pg      (27.0-31.0)             Mean Corpuscular Hemoglobin      Concent 30.7      (33.0-37.0)             Mean Corpuscular Volume       97.7 fL      (80.0-96.0)            Laboratory Tests            Test       9/16/19      14:27             White Blood Count       8.10 10*3/uL      (4.80-10.80)             Red Blood Count       3.84 10*6/uL      (4.70-6.10)             Hemoglobin       11.5 g/dL      (14.0-18.0)             Hematocrit       37.5 %      (42.0-52.0)             Mean Corpuscular Volume       97.7 fL      (80.0-96.0)             Mean Corpuscular Hemoglobin       29.9 pg      (27.0-31.0)             Mean Corpuscular Hemoglobin      Concent 30.7      (33.0-37.0)             Red Cell Distribution Width       13.2 %      (11.6-14.4)             RDW Standard Deviation       47.5 fL      (35.1-43.9)             Platelet Count       255 10*3/uL      (130-400)             Mean Platelet Volume       10.5 fL      (9.4-12.4)             Granulocytes (%)        59.0 %      (30.0-85.0)             Granulocytes #       4.77 10*3/uL      (2.00-8.00)             Lymphocytes # (Auto)       2.47 10*3/uL      (1.00-5.00)             Monocytes # (Auto)       0.57 10*3/uL      (0.20-1.20)             Eosinophils # (Auto)       0.21 10*3/uL      (0.00-0.70)             Basophils # (Auto)       0.06 10*3/uL      (0.00-0.20)             Immature Granulocytes %       0.2 %      (0.0-1.8)             Lymphocytes %       30.5 %      (20.0-45.0)             Monocytes %       7.0 %      (3.0-10.0)             Eosinophils %       2.6 %      (0.0-7.0)             Basophils %       0.7 %      (0.0-3.0)             Nucleated Red Blood Cells %       0.00 %      (0.00-0.70)             Immature Granulocytes #       0.02 10*3/uL      (0.00-0.20)             Absolute Reticulocyte Count       0.0695 10*6/uL      (0.0260-0.0950)             Percent Reticulocyte Count       1.81 %      (0.51-1.81)            Laboratory Tests            Test       9/16/19      14:27             White Blood Count       8.10 10*3/uL      (4.80-10.80)             Red Blood Count       3.84 10*6/uL      (4.70-6.10)             Hemoglobin       11.5 g/dL      (14.0-18.0)             Hematocrit       37.5 %      (42.0-52.0)             Mean Corpuscular Volume       97.7 fL      (80.0-96.0)             Mean Corpuscular Hemoglobin       29.9 pg      (27.0-31.0)             Mean Corpuscular Hemoglobin      Concent 30.7      (33.0-37.0)             Red Cell Distribution Width       13.2 %      (11.6-14.4)             RDW Standard Deviation       47.5 fL      (35.1-43.9)             Platelet Count       255 10*3/uL      (130-400)             Mean Platelet Volume       10.5 fL      (9.4-12.4)             Granulocytes (%)       59.0 %      (30.0-85.0)             Granulocytes #       4.77 10*3/uL      (2.00-8.00)             Lymphocytes # (Auto)       2.47 10*3/uL      (1.00-5.00)             Monocytes # (Auto)       0.57  10*3/uL      (0.20-1.20)             Eosinophils # (Auto)       0.21 10*3/uL      (0.00-0.70)             Basophils # (Auto)       0.06 10*3/uL      (0.00-0.20)             Immature Granulocytes %       0.2 %      (0.0-1.8)             Lymphocytes %       30.5 %      (20.0-45.0)             Monocytes %       7.0 %      (3.0-10.0)             Eosinophils %       2.6 %      (0.0-7.0)             Basophils %       0.7 %      (0.0-3.0)             Nucleated Red Blood Cells %       0.00 %      (0.00-0.70)             Immature Granulocytes #       0.02 10*3/uL      (0.00-0.20)             Absolute Reticulocyte Count       0.0695 10*6/uL      (0.0260-0.0950)             Percent Reticulocyte Count       1.81 %      (0.51-1.81)            Impression/Problem List      Normochromic normocytic anemia may be secondary to chronic kidney disease, rule     out myelodysplastic syndrome      Diabetes mellitus      Hyperlipidemia      Chronic kidney disease stage II      Hypertension      Skin cancer      Arthritis      Chronic diarrhea may be secondary to metformin            Plan      The following test is been obtained today: CBC, CMP      Iron profile, reticulocyte count, ferritin      Thyroid profile      B12, folate      Serum protein electrophoresis with immunofixation      Serum free light chain      Erythropoietin level      Zinc and copper      Magnesium for diarrhea      Thank you very much for allowing me to participate in the care of your patient.     I will keep you posted on the progress of his workup.            Carbon Copy:      Copies To 2:   Maged Duarte ;            Patient Education:        Anemia            PREVENTION      2 or More Falls Past Year?:  No      Fall Past Year with Injury?:  No      Chart initiated by      AKIRA Duggan MA                 Disclaimer: Converted document may not contain table formatting or lab diagrams. Please see [employer] for the authenticated document.

## 2021-07-20 RX ORDER — NIFEDIPINE 90 MG/1
90 TABLET, EXTENDED RELEASE ORAL DAILY
Qty: 90 TABLET | Refills: 0 | Status: SHIPPED | OUTPATIENT
Start: 2021-07-20 | End: 2021-10-19 | Stop reason: SDUPTHER

## 2021-07-20 RX ORDER — NIFEDIPINE 90 MG/1
1 TABLET, EXTENDED RELEASE ORAL DAILY
COMMUNITY
End: 2021-07-20 | Stop reason: SDUPTHER

## 2021-07-20 NOTE — TELEPHONE ENCOUNTER
PATIENT NEEDS REFILL FOR nIFEDIPINE 90 MG TAKE ONE BY THE MOUTH EVERY DAY ON AN EMPTY STOMACH 90 DAY TO ADELINE [facility]

## 2021-07-29 ENCOUNTER — TRANSCRIBE ORDERS (OUTPATIENT)
Dept: LAB | Facility: HOSPITAL | Age: 76
End: 2021-07-29

## 2021-07-29 ENCOUNTER — LAB (OUTPATIENT)
Dept: LAB | Facility: HOSPITAL | Age: 76
End: 2021-07-29

## 2021-07-29 DIAGNOSIS — N28.9 RENAL INSUFFICIENCY: Primary | ICD-10-CM

## 2021-07-29 DIAGNOSIS — N28.9 RENAL INSUFFICIENCY: ICD-10-CM

## 2021-07-29 LAB
ALBUMIN SERPL-MCNC: 4.3 G/DL (ref 3.5–5.2)
ANION GAP SERPL CALCULATED.3IONS-SCNC: 12.6 MMOL/L (ref 5–15)
BACTERIA UR QL AUTO: NORMAL /HPF
BASOPHILS # BLD AUTO: 0.06 10*3/MM3 (ref 0–0.2)
BASOPHILS NFR BLD AUTO: 0.5 % (ref 0–1.5)
BILIRUB UR QL STRIP: NEGATIVE
BUN SERPL-MCNC: 26 MG/DL (ref 8–23)
BUN/CREAT SERPL: 19.8 (ref 7–25)
CALCIUM SPEC-SCNC: 9.6 MG/DL (ref 8.6–10.5)
CHLORIDE SERPL-SCNC: 103 MMOL/L (ref 98–107)
CLARITY UR: CLEAR
CO2 SERPL-SCNC: 19.4 MMOL/L (ref 22–29)
COLOR UR: YELLOW
CREAT SERPL-MCNC: 1.31 MG/DL (ref 0.76–1.27)
CREAT UR-MCNC: 44.5 MG/DL
DEPRECATED RDW RBC AUTO: 42.8 FL (ref 37–54)
EOSINOPHIL # BLD AUTO: 0.35 10*3/MM3 (ref 0–0.4)
EOSINOPHIL NFR BLD AUTO: 3 % (ref 0.3–6.2)
ERYTHROCYTE [DISTWIDTH] IN BLOOD BY AUTOMATED COUNT: 12.9 % (ref 12.3–15.4)
GFR SERPL CREATININE-BSD FRML MDRD: 54 ML/MIN/1.73
GLUCOSE SERPL-MCNC: 106 MG/DL (ref 65–99)
GLUCOSE UR STRIP-MCNC: NEGATIVE MG/DL
HCT VFR BLD AUTO: 38.9 % (ref 37.5–51)
HGB BLD-MCNC: 13 G/DL (ref 13–17.7)
HGB UR QL STRIP.AUTO: NEGATIVE
HYALINE CASTS UR QL AUTO: NORMAL /LPF
IMM GRANULOCYTES # BLD AUTO: 0.05 10*3/MM3 (ref 0–0.05)
IMM GRANULOCYTES NFR BLD AUTO: 0.4 % (ref 0–0.5)
KETONES UR QL STRIP: NEGATIVE
LEUKOCYTE ESTERASE UR QL STRIP.AUTO: NEGATIVE
LYMPHOCYTES # BLD AUTO: 1.93 10*3/MM3 (ref 0.7–3.1)
LYMPHOCYTES NFR BLD AUTO: 16.6 % (ref 19.6–45.3)
MCH RBC QN AUTO: 30.8 PG (ref 26.6–33)
MCHC RBC AUTO-ENTMCNC: 33.4 G/DL (ref 31.5–35.7)
MCV RBC AUTO: 92.2 FL (ref 79–97)
MONOCYTES # BLD AUTO: 0.77 10*3/MM3 (ref 0.1–0.9)
MONOCYTES NFR BLD AUTO: 6.6 % (ref 5–12)
NEUTROPHILS NFR BLD AUTO: 72.9 % (ref 42.7–76)
NEUTROPHILS NFR BLD AUTO: 8.45 10*3/MM3 (ref 1.7–7)
NITRITE UR QL STRIP: NEGATIVE
NRBC BLD AUTO-RTO: 0 /100 WBC (ref 0–0.2)
PH UR STRIP.AUTO: 6 [PH] (ref 5–8)
PHOSPHATE SERPL-MCNC: 4 MG/DL (ref 2.5–4.5)
PLATELET # BLD AUTO: 214 10*3/MM3 (ref 140–450)
PMV BLD AUTO: 11.8 FL (ref 6–12)
POTASSIUM SERPL-SCNC: 5.1 MMOL/L (ref 3.5–5.2)
PROT UR QL STRIP: ABNORMAL
PROT UR-MCNC: 176 MG/DL
RBC # BLD AUTO: 4.22 10*6/MM3 (ref 4.14–5.8)
RBC # UR: NORMAL /HPF
REF LAB TEST METHOD: NORMAL
SODIUM SERPL-SCNC: 135 MMOL/L (ref 136–145)
SP GR UR STRIP: 1.01 (ref 1–1.03)
SQUAMOUS #/AREA URNS HPF: NORMAL /HPF
UROBILINOGEN UR QL STRIP: ABNORMAL
WBC # BLD AUTO: 11.61 10*3/MM3 (ref 3.4–10.8)
WBC UR QL AUTO: NORMAL /HPF

## 2021-07-29 PROCEDURE — 80069 RENAL FUNCTION PANEL: CPT

## 2021-07-29 PROCEDURE — 36415 COLL VENOUS BLD VENIPUNCTURE: CPT

## 2021-07-29 PROCEDURE — 82570 ASSAY OF URINE CREATININE: CPT

## 2021-07-29 PROCEDURE — 84156 ASSAY OF PROTEIN URINE: CPT

## 2021-07-29 PROCEDURE — 85025 COMPLETE CBC W/AUTO DIFF WBC: CPT

## 2021-07-29 PROCEDURE — 81001 URINALYSIS AUTO W/SCOPE: CPT

## 2021-09-07 ENCOUNTER — LAB (OUTPATIENT)
Dept: LAB | Facility: HOSPITAL | Age: 76
End: 2021-09-07

## 2021-09-07 ENCOUNTER — TRANSCRIBE ORDERS (OUTPATIENT)
Dept: INTERNAL MEDICINE | Facility: CLINIC | Age: 76
End: 2021-09-07

## 2021-09-07 DIAGNOSIS — D50.9 IRON DEFICIENCY ANEMIA, UNSPECIFIED IRON DEFICIENCY ANEMIA TYPE: ICD-10-CM

## 2021-09-07 DIAGNOSIS — N18.6 TYPE 2 DIABETES MELLITUS WITH ESRD (END-STAGE RENAL DISEASE) (HCC): Primary | ICD-10-CM

## 2021-09-07 DIAGNOSIS — E11.22 TYPE 2 DIABETES MELLITUS WITH ESRD (END-STAGE RENAL DISEASE) (HCC): Primary | ICD-10-CM

## 2021-09-07 DIAGNOSIS — E78.5 HYPERLIPIDEMIA, UNSPECIFIED HYPERLIPIDEMIA TYPE: ICD-10-CM

## 2021-09-07 DIAGNOSIS — E11.22 TYPE 2 DIABETES MELLITUS WITH ESRD (END-STAGE RENAL DISEASE) (HCC): ICD-10-CM

## 2021-09-07 DIAGNOSIS — N18.6 TYPE 2 DIABETES MELLITUS WITH ESRD (END-STAGE RENAL DISEASE) (HCC): ICD-10-CM

## 2021-09-07 LAB
ALBUMIN SERPL-MCNC: 4.4 G/DL (ref 3.5–5.2)
ALBUMIN/GLOB SERPL: 2.1 G/DL
ALP SERPL-CCNC: 41 U/L (ref 39–117)
ALT SERPL W P-5'-P-CCNC: 15 U/L (ref 1–41)
ANION GAP SERPL CALCULATED.3IONS-SCNC: 13 MMOL/L (ref 5–15)
AST SERPL-CCNC: 16 U/L (ref 1–40)
BASOPHILS # BLD AUTO: 0.05 10*3/MM3 (ref 0–0.2)
BASOPHILS NFR BLD AUTO: 0.7 % (ref 0–1.5)
BILIRUB SERPL-MCNC: 0.3 MG/DL (ref 0–1.2)
BUN SERPL-MCNC: 32 MG/DL (ref 8–23)
BUN/CREAT SERPL: 21.6 (ref 7–25)
CALCIUM SPEC-SCNC: 9.4 MG/DL (ref 8.6–10.5)
CHLORIDE SERPL-SCNC: 103 MMOL/L (ref 98–107)
CHOLEST SERPL-MCNC: 155 MG/DL (ref 0–200)
CO2 SERPL-SCNC: 23 MMOL/L (ref 22–29)
CREAT SERPL-MCNC: 1.48 MG/DL (ref 0.76–1.27)
DEPRECATED RDW RBC AUTO: 42.4 FL (ref 37–54)
EOSINOPHIL # BLD AUTO: 0.26 10*3/MM3 (ref 0–0.4)
EOSINOPHIL NFR BLD AUTO: 3.5 % (ref 0.3–6.2)
ERYTHROCYTE [DISTWIDTH] IN BLOOD BY AUTOMATED COUNT: 12.9 % (ref 12.3–15.4)
GFR SERPL CREATININE-BSD FRML MDRD: 47 ML/MIN/1.73
GLOBULIN UR ELPH-MCNC: 2.1 GM/DL
GLUCOSE SERPL-MCNC: 135 MG/DL (ref 65–99)
HBA1C MFR BLD: 6.4 % (ref 4.8–5.6)
HCT VFR BLD AUTO: 39 % (ref 37.5–51)
HDLC SERPL-MCNC: 29 MG/DL (ref 40–60)
HGB BLD-MCNC: 13.1 G/DL (ref 13–17.7)
IMM GRANULOCYTES # BLD AUTO: 0.02 10*3/MM3 (ref 0–0.05)
IMM GRANULOCYTES NFR BLD AUTO: 0.3 % (ref 0–0.5)
LDLC SERPL CALC-MCNC: 84 MG/DL (ref 0–100)
LDLC/HDLC SERPL: 2.6 {RATIO}
LYMPHOCYTES # BLD AUTO: 1.73 10*3/MM3 (ref 0.7–3.1)
LYMPHOCYTES NFR BLD AUTO: 23.1 % (ref 19.6–45.3)
MCH RBC QN AUTO: 30.8 PG (ref 26.6–33)
MCHC RBC AUTO-ENTMCNC: 33.6 G/DL (ref 31.5–35.7)
MCV RBC AUTO: 91.8 FL (ref 79–97)
MONOCYTES # BLD AUTO: 0.49 10*3/MM3 (ref 0.1–0.9)
MONOCYTES NFR BLD AUTO: 6.5 % (ref 5–12)
NEUTROPHILS NFR BLD AUTO: 4.95 10*3/MM3 (ref 1.7–7)
NEUTROPHILS NFR BLD AUTO: 65.9 % (ref 42.7–76)
NRBC BLD AUTO-RTO: 0 /100 WBC (ref 0–0.2)
PLATELET # BLD AUTO: 178 10*3/MM3 (ref 140–450)
PMV BLD AUTO: 11.8 FL (ref 6–12)
POTASSIUM SERPL-SCNC: 4.8 MMOL/L (ref 3.5–5.2)
PROT SERPL-MCNC: 6.5 G/DL (ref 6–8.5)
RBC # BLD AUTO: 4.25 10*6/MM3 (ref 4.14–5.8)
SODIUM SERPL-SCNC: 139 MMOL/L (ref 136–145)
TRIGL SERPL-MCNC: 253 MG/DL (ref 0–150)
VLDLC SERPL-MCNC: 42 MG/DL (ref 5–40)
WBC # BLD AUTO: 7.5 10*3/MM3 (ref 3.4–10.8)

## 2021-09-07 PROCEDURE — 83036 HEMOGLOBIN GLYCOSYLATED A1C: CPT

## 2021-09-07 PROCEDURE — 80053 COMPREHEN METABOLIC PANEL: CPT

## 2021-09-07 PROCEDURE — 85025 COMPLETE CBC W/AUTO DIFF WBC: CPT

## 2021-09-07 PROCEDURE — 80061 LIPID PANEL: CPT

## 2021-09-07 PROCEDURE — 36415 COLL VENOUS BLD VENIPUNCTURE: CPT

## 2021-09-07 RX ORDER — MONTELUKAST SODIUM 10 MG/1
10 TABLET ORAL NIGHTLY
COMMUNITY
End: 2021-09-07 | Stop reason: SDUPTHER

## 2021-09-07 RX ORDER — MONTELUKAST SODIUM 10 MG/1
10 TABLET ORAL NIGHTLY
Qty: 90 TABLET | Refills: 0 | Status: SHIPPED | OUTPATIENT
Start: 2021-09-07 | End: 2021-11-29 | Stop reason: SDUPTHER

## 2021-09-12 PROBLEM — J30.2 SEASONAL ALLERGIC RHINITIS: Status: ACTIVE | Noted: 2021-09-12

## 2021-09-12 PROBLEM — N18.31 STAGE 3A CHRONIC KIDNEY DISEASE: Status: ACTIVE | Noted: 2021-09-12

## 2021-09-12 PROBLEM — E78.2 MIXED HYPERLIPIDEMIA: Status: ACTIVE | Noted: 2017-02-28

## 2021-09-12 PROBLEM — H92.01 OTALGIA OF RIGHT EAR: Status: ACTIVE | Noted: 2021-09-12

## 2021-09-12 PROBLEM — Z87.11 HISTORY OF PEPTIC ULCER DISEASE: Status: ACTIVE | Noted: 2021-09-12

## 2021-09-12 PROBLEM — N32.9 BLADDER DISORDER: Status: ACTIVE | Noted: 2021-09-12

## 2021-09-12 PROBLEM — M51.16 RADICULOPATHY DUE TO LUMBAR INTERVERTEBRAL DISC DISORDER: Status: ACTIVE | Noted: 2021-09-12

## 2021-09-12 PROBLEM — N42.9 PROSTATE DISORDER: Status: ACTIVE | Noted: 2021-09-12

## 2021-09-12 PROBLEM — E11.9 TYPE 2 DIABETES MELLITUS: Status: ACTIVE | Noted: 2021-09-12

## 2021-09-12 PROBLEM — E11.9 TYPE 2 DIABETES MELLITUS WITHOUT COMPLICATION, WITHOUT LONG-TERM CURRENT USE OF INSULIN: Status: ACTIVE | Noted: 2021-09-12

## 2021-09-13 NOTE — PROGRESS NOTES
"Chief Complaint  Follow-up    Subjective          Telly Fitch presents to [facility]     History of Present Illness    Patient pleasant 76-year-old male with underlying hypertension, hyperlipidemia, chronic kidney disease stage 3a, as well as diabetes mellitus, who is coming in for routine 3 to 4-month follow-up.  We will review his labs, address any new concerns, and make new recommendations at that time.    Review of Systems   Constitutional: Negative for appetite change, fatigue and fever.   HENT: Negative for congestion and ear pain.    Eyes: Negative for blurred vision.   Respiratory: Negative for cough, chest tightness, shortness of breath and wheezing.    Cardiovascular: Negative for chest pain, palpitations and leg swelling.   Gastrointestinal: Negative for abdominal pain.   Genitourinary: Negative for difficulty urinating, dysuria and hematuria.   Musculoskeletal: Negative for arthralgias and gait problem.   Skin: Negative for skin lesions.   Neurological: Negative for syncope, memory problem and confusion.   Psychiatric/Behavioral: Negative for self-injury and depressed mood.       Objective   Vital Signs:   /70 (BP Location: Right arm, Patient Position: Sitting, Cuff Size: Adult)   Pulse 61   Temp 97.1 °F (36.2 °C) (Temporal)   Ht 177.8 cm (70\")   Wt 100 kg (221 lb)   SpO2 96%   BMI 31.71 kg/m²           Physical Exam  Vitals and nursing note reviewed.   Constitutional:       General: He is not in acute distress.     Appearance: Normal appearance. He is not toxic-appearing.   HENT:      Head: Atraumatic.      Right Ear: External ear normal.      Left Ear: External ear normal.      Nose: Nose normal.      Mouth/Throat:      Mouth: Mucous membranes are moist.   Eyes:      General:         Right eye: No discharge.         Left eye: No discharge.      Extraocular Movements: Extraocular movements intact.      Pupils: Pupils are equal, round, and reactive " to light.   Cardiovascular:      Rate and Rhythm: Normal rate and regular rhythm.      Pulses: Normal pulses.      Heart sounds: Normal heart sounds. No murmur heard.   No gallop.    Pulmonary:      Effort: Pulmonary effort is normal. No respiratory distress.      Breath sounds: No wheezing, rhonchi or rales.   Abdominal:      General: There is no distension.      Palpations: Abdomen is soft. There is no mass.      Tenderness: There is no abdominal tenderness. There is no guarding.   Musculoskeletal:         General: No swelling or tenderness.      Cervical back: No tenderness.      Right lower leg: No edema.      Left lower leg: No edema.   Skin:     General: Skin is warm and dry.      Findings: No rash.   Neurological:      General: No focal deficit present.      Mental Status: He is alert and oriented to person, place, and time. Mental status is at baseline.      Motor: No weakness.      Gait: Gait normal.   Psychiatric:         Mood and Affect: Mood normal.         Thought Content: Thought content normal.          Result Review :   The following data was reviewed by: Anuel Fisher MD on 09/16/2021:                  Assessment and Plan    Diagnoses and all orders for this visit:    1. Benign essential HTN (Primary)  Overview:  Blood pressure remains well controlled as of 9/21 office.  Patient is stable on doxazosin as well as Procardia.  Additionally, nephrology added low-dose furosemide about a month ago.  Patient to continue all.    Orders:  -     Basic Metabolic Panel; Future    2. Mixed hyperlipidemia  Overview:  LDL of 84 as of his 9/21 office visit is at goal.  Patient is stable on low-dose therapy, he is on pravastatin 20 mg only.  Continue same.      3. Stage 3a chronic kidney disease (CMS/HCC)  Overview:  GFR of 47 as of 9/21 is improved, he was 42 when I saw him earlier in the year.  He stable this regard, we will continue to monitor on a routine basis.    Orders:  -     Basic Metabolic Panel;  Future    4. Type 2 diabetes mellitus without complication, without long-term current use of insulin (CMS/[facility])  Overview:  A1c of 6.4 as of his 9/21 office visit is obviously very stable.  He is not of yet required any prescription therapy for this.  Continue with conservative dietary restrictions.    Orders:  -     Hemoglobin A1c; Future    5. History of peptic ulcer disease  Overview:  Patient with no signs or symptoms of this as of his 9/21 office visit.  He does not look like he requires any chronic antiacid regimens.         --  --  VISIT 5/21:  --  HTN typically controlled and please check at home...HCTZ added since last OV; currently high in AM, ? related to CPAP he quit using; will change flomax to cardura and titrate; if no benefit, then will need to get back on CPAP...try 12.5 HCTZ given urinary c/o...seeing Dr Barrera and I rec 24-hr BP monitor and then review results with him on RTO ( ? related to not being on CPAP)...better 8/18, but will try off HCTZ given incont and increase cardura...has not needed PRN clonidine given at 1/19 urgent visit; stable 2/19...up 3/20 and will see if RYR helps=wife thinks it will...145/70 at home=bring wrist cuff on RTO...his cuff reads about 10 pts high top/bottom, but his numbers are up more at home as well; will max out cardura 9/20---> wel controlled 5/21.  CKD3a stable=1.6 (42%) and d/w no nsaids...stable 10/16 = 44%...54% nice...42% ? realted to recent stone tx...46% is holding...53%...48%...60% is nice to see as of 9/20 OV...55% is fine 1/21---> 42% is a bit concerning b/c of proteinuria, but not too far off his norm.  --  LIPIDS...he's maxed out as far as TG's go, but ; rec add fish oil...TG's down with wt loss and/or fish oil... and defer changes...110...99...114 ok for now 2/19...109 in 3/20 and will stop Tricor and try RYR with the statin...TG's 250 off tricor and on RYR; LDL only 70, so no changes needed---> 80 in 1/21.  CP is vague and has appt  with cards next week or two...S/P CATH 4/13 with no signif lesion.  --  DM up some to 6.8, but no change meds needed...ditto for 7.0 after holidays...6.0 with wt loss, so lower amaryl to qd...5.5 so stop it...6.8 so resume 1/2 of 2 mg tab in AM only...he was on 1/2 bid up until just a week ago; A1C 6.0, so ok to stay on 1/2 bid...6.3...6.1...5.9 and rec only take 1/2 in AM...6.4 is fine and will stop it on RTO if same...6.0 and will stop the 2 mg of amaryl now=2/19...6.8...6.3 is better ballpark off it---> 6.3 is same in 5/21.  MICRO-ALB = 140 at 2/18 OV; on max ARB/CCB, so will just monitor for now---> was 500 in '20 and now is 5000 in 5/21 = to RENAL and I will get U/S.  MORBID OBESITY down 25 past 6 mo to 226 at 6/16 OV--->225 holding.  --  PULM NODULES 11/14 (former tobacco screening)...no change 6/15 with f/u needed...LLL 5 mm is stable many years, but RUL 7 mm needs one more...neg and no further rec.  --  UGI BLEED=PUD s/p inject...Dr Duarte 12/12 with neg EGD...Hgb recovered...12.5 so resume iron...13.1 better and likely== CKD...12.7 is fine...13 +  FE DEF ANEMIA and I rec lower to qd for ferritin of 310 at 10/17 OV...12.2 and ferritin better, so stay on qd...had scopes and has f/u with Dr Duarte for path and labs as of 5/18 OV...12.2... 12.7 and ok to stay on for now...12.1 with stable iron is ok...11.4 is wrong direction, so will ask Dr Arguello to eval...she has him on more iron and B12; had capsule endoscopy neg '19 as well=defer to Dr Arguello---> 12.7 in 1/21 with stable iron.  LFT's with mild bump that Dr Duarte noted as well...wnl...still wnl, but agree with checking Hep C... Hep B/C neg.  GERD w/o dysphagia.  --  A.R.....on zyrtec/nasonex/singulair, and sees Dr Charles...had CT per Dr Mosqueda (ENT).  RAD w/o flare.   RUPESH on CPAP @ 15 cmH2O...quit using it past year b/c issues with getting parts?  --  URGENT VISIT 7/19 and 11/20:  R LEG PAIN=twisted knee a few weeks ago, and then fell in hole yesterday; throbs at  rest, and painful to walk on; no swelling in knee, and actually says pain is moreso in the hip; decent ROM, but has point tenderness in the greater trochanter; per orders and call Monday.  LBP into L LEG=I d/w him it's not the hip=saw Dr Been last month; no change bowel/bladder; is tender L greater trochanter, neg SLR; will get into PTA for L hip and LBP and add gabapentin/medrol...sxs resolved with just gabapentin and ? with passing kidney stone=d/w try weaning off it now...sxs in L hand and in L leg as of 3/20 OV that may be overuse syndrome=laying tile/etc for past 3 months; will get NCS given weak  and refer to Dr Randhawa again if worse.  --  KIDNEY STONES per a Dr Benavidez.  BPH/OAB per Urology in [place].  --  PSA 0.3 (7/11/12)...defer  COLON/EGD 4/18...polyps x1...f/u per Dr Duarte...capsule endo neg '19.  Pneumovax x 1; Prevnar rec 10/16 and 8/18. Hep A x1-2.  Neither of them are interested in Covid vaccine.  (, my pt)    Follow Up   No follow-ups on file.  Patient was given instructions and counseling regarding his condition or for health maintenance advice. Please see specific information pulled into the AVS if appropriate.

## 2021-09-16 ENCOUNTER — OFFICE VISIT (OUTPATIENT)
Dept: INTERNAL MEDICINE | Facility: CLINIC | Age: 76
End: 2021-09-16

## 2021-09-16 VITALS
SYSTOLIC BLOOD PRESSURE: 130 MMHG | HEART RATE: 61 BPM | WEIGHT: 221 LBS | DIASTOLIC BLOOD PRESSURE: 70 MMHG | HEIGHT: 70 IN | BODY MASS INDEX: 31.64 KG/M2 | TEMPERATURE: 97.1 F | OXYGEN SATURATION: 96 %

## 2021-09-16 DIAGNOSIS — E78.2 MIXED HYPERLIPIDEMIA: ICD-10-CM

## 2021-09-16 DIAGNOSIS — I10 BENIGN ESSENTIAL HTN: Primary | ICD-10-CM

## 2021-09-16 DIAGNOSIS — N18.31 STAGE 3A CHRONIC KIDNEY DISEASE (HCC): ICD-10-CM

## 2021-09-16 DIAGNOSIS — Z87.11 HISTORY OF PEPTIC ULCER DISEASE: ICD-10-CM

## 2021-09-16 DIAGNOSIS — E11.9 TYPE 2 DIABETES MELLITUS WITHOUT COMPLICATION, WITHOUT LONG-TERM CURRENT USE OF INSULIN (HCC): ICD-10-CM

## 2021-09-16 PROCEDURE — 99214 OFFICE O/P EST MOD 30 MIN: CPT | Performed by: INTERNAL MEDICINE

## 2021-09-16 RX ORDER — NITROGLYCERIN 0.4 MG/1
0.4 TABLET SUBLINGUAL
Qty: 1 TABLET | Refills: 1 | Status: SHIPPED | OUTPATIENT
Start: 2021-09-16

## 2021-09-16 RX ORDER — FINASTERIDE 5 MG/1
5 TABLET, FILM COATED ORAL DAILY
COMMUNITY
End: 2022-02-23 | Stop reason: SDUPTHER

## 2021-09-16 RX ORDER — EAR PLUGS
EACH OTIC (EAR) EVERY 24 HOURS
COMMUNITY
End: 2021-11-29

## 2021-09-16 RX ORDER — DOXAZOSIN 2 MG/1
8 TABLET ORAL EVERY 24 HOURS
COMMUNITY
Start: 2021-03-22 | End: 2022-04-20 | Stop reason: SDUPTHER

## 2021-09-16 RX ORDER — MULTIVIT WITH MINERALS/LUTEIN
1000 TABLET ORAL 2 TIMES DAILY
COMMUNITY

## 2021-09-16 RX ORDER — PRAVASTATIN SODIUM 20 MG
20 TABLET ORAL NIGHTLY
COMMUNITY
End: 2022-03-07 | Stop reason: SDUPTHER

## 2021-09-16 RX ORDER — CHLORAL HYDRATE 500 MG
CAPSULE ORAL
COMMUNITY
End: 2022-01-11

## 2021-09-16 RX ORDER — ASCORBIC ACID 500 MG
TABLET ORAL
COMMUNITY
End: 2022-01-11

## 2021-09-16 RX ORDER — FUROSEMIDE 20 MG/1
20 TABLET ORAL DAILY
COMMUNITY
End: 2022-01-11

## 2021-09-16 RX ORDER — TOLTERODINE TARTRATE 2 MG/1
4 TABLET, EXTENDED RELEASE ORAL DAILY
COMMUNITY
Start: 2021-06-28 | End: 2022-04-05 | Stop reason: SDUPTHER

## 2021-10-01 ENCOUNTER — CLINICAL SUPPORT (OUTPATIENT)
Dept: INTERNAL MEDICINE | Facility: CLINIC | Age: 76
End: 2021-10-01

## 2021-10-01 DIAGNOSIS — Z23 NEED FOR VACCINATION: ICD-10-CM

## 2021-10-01 PROCEDURE — 90662 IIV NO PRSV INCREASED AG IM: CPT | Performed by: INTERNAL MEDICINE

## 2021-10-01 PROCEDURE — G0008 ADMIN INFLUENZA VIRUS VAC: HCPCS | Performed by: INTERNAL MEDICINE

## 2021-10-06 RX ORDER — CARVEDILOL 12.5 MG/1
12.5 TABLET ORAL 2 TIMES DAILY
Qty: 180 TABLET | Refills: 0 | Status: SHIPPED | OUTPATIENT
Start: 2021-10-06 | End: 2022-01-04 | Stop reason: SDUPTHER

## 2021-10-06 RX ORDER — FLUTICASONE PROPIONATE 50 MCG
1 SPRAY, SUSPENSION (ML) NASAL DAILY
Qty: 16 G | Refills: 0 | Status: SHIPPED | OUTPATIENT
Start: 2021-10-06 | End: 2021-10-11 | Stop reason: SDUPTHER

## 2021-10-06 RX ORDER — CANDESARTAN 32 MG/1
32 TABLET ORAL NIGHTLY
Qty: 90 TABLET | Refills: 0 | Status: SHIPPED | OUTPATIENT
Start: 2021-10-06 | End: 2022-01-11 | Stop reason: SDUPTHER

## 2021-10-06 RX ORDER — CANDESARTAN 32 MG/1
32 TABLET ORAL NIGHTLY
COMMUNITY
Start: 2021-07-06 | End: 2021-10-06 | Stop reason: SDUPTHER

## 2021-10-06 RX ORDER — CARVEDILOL 12.5 MG/1
12.5 TABLET ORAL 2 TIMES DAILY
COMMUNITY
End: 2021-10-06 | Stop reason: SDUPTHER

## 2021-10-06 RX ORDER — FLUTICASONE PROPIONATE 50 MCG
1 SPRAY, SUSPENSION (ML) NASAL DAILY
COMMUNITY
End: 2021-10-06 | Stop reason: SDUPTHER

## 2021-10-12 RX ORDER — FLUTICASONE PROPIONATE 50 MCG
2 SPRAY, SUSPENSION (ML) NASAL DAILY
Qty: 48 G | Refills: 3 | Status: SHIPPED | OUTPATIENT
Start: 2021-10-12 | End: 2023-02-27 | Stop reason: SDUPTHER

## 2021-10-19 RX ORDER — NIFEDIPINE 90 MG/1
90 TABLET, EXTENDED RELEASE ORAL DAILY
Qty: 90 TABLET | Refills: 0 | Status: SHIPPED | OUTPATIENT
Start: 2021-10-19 | End: 2022-01-19 | Stop reason: SDUPTHER

## 2021-11-03 ENCOUNTER — TRANSCRIBE ORDERS (OUTPATIENT)
Dept: ADMINISTRATIVE | Facility: HOSPITAL | Age: 76
End: 2021-11-03

## 2021-11-03 ENCOUNTER — LAB (OUTPATIENT)
Dept: LAB | Facility: HOSPITAL | Age: 76
End: 2021-11-03

## 2021-11-03 DIAGNOSIS — N18.30 STAGE 3 CHRONIC KIDNEY DISEASE, UNSPECIFIED WHETHER STAGE 3A OR 3B CKD (HCC): Primary | ICD-10-CM

## 2021-11-03 DIAGNOSIS — R60.9 EDEMA, UNSPECIFIED TYPE: ICD-10-CM

## 2021-11-03 DIAGNOSIS — I10 HYPERTENSION, ESSENTIAL: ICD-10-CM

## 2021-11-03 DIAGNOSIS — N18.30 STAGE 3 CHRONIC KIDNEY DISEASE, UNSPECIFIED WHETHER STAGE 3A OR 3B CKD (HCC): ICD-10-CM

## 2021-11-03 LAB
25(OH)D3 SERPL-MCNC: 38.5 NG/ML
ALBUMIN SERPL-MCNC: 4.1 G/DL (ref 3.5–5.2)
ANION GAP SERPL CALCULATED.3IONS-SCNC: 10.3 MMOL/L (ref 5–15)
BACTERIA UR QL AUTO: NORMAL /HPF
BILIRUB UR QL STRIP: NEGATIVE
BUN SERPL-MCNC: 31 MG/DL (ref 8–23)
BUN/CREAT SERPL: 19.9 (ref 7–25)
CALCIUM SPEC-SCNC: 8.9 MG/DL (ref 8.6–10.5)
CHLORIDE SERPL-SCNC: 106 MMOL/L (ref 98–107)
CLARITY UR: CLEAR
CO2 SERPL-SCNC: 20.7 MMOL/L (ref 22–29)
COLOR UR: YELLOW
CREAT SERPL-MCNC: 1.56 MG/DL (ref 0.76–1.27)
CREAT UR-MCNC: 140.5 MG/DL
DEPRECATED RDW RBC AUTO: 40.3 FL (ref 37–54)
ERYTHROCYTE [DISTWIDTH] IN BLOOD BY AUTOMATED COUNT: 12.6 % (ref 12.3–15.4)
GFR SERPL CREATININE-BSD FRML MDRD: 44 ML/MIN/1.73
GLUCOSE SERPL-MCNC: 129 MG/DL (ref 65–99)
GLUCOSE UR STRIP-MCNC: NEGATIVE MG/DL
HCT VFR BLD AUTO: 36.9 % (ref 37.5–51)
HGB BLD-MCNC: 12.7 G/DL (ref 13–17.7)
HGB UR QL STRIP.AUTO: NEGATIVE
HYALINE CASTS UR QL AUTO: NORMAL /LPF
KETONES UR QL STRIP: ABNORMAL
LEUKOCYTE ESTERASE UR QL STRIP.AUTO: NEGATIVE
MCH RBC QN AUTO: 31.1 PG (ref 26.6–33)
MCHC RBC AUTO-ENTMCNC: 34.4 G/DL (ref 31.5–35.7)
MCV RBC AUTO: 90.2 FL (ref 79–97)
NITRITE UR QL STRIP: NEGATIVE
PH UR STRIP.AUTO: 5.5 [PH] (ref 5–8)
PHOSPHATE SERPL-MCNC: 3.3 MG/DL (ref 2.5–4.5)
PLATELET # BLD AUTO: 183 10*3/MM3 (ref 140–450)
PMV BLD AUTO: 11.8 FL (ref 6–12)
POTASSIUM SERPL-SCNC: 4.5 MMOL/L (ref 3.5–5.2)
PROT UR QL STRIP: ABNORMAL
PROT UR-MCNC: 305 MG/DL
PTH-INTACT SERPL-MCNC: 21.3 PG/ML (ref 15–65)
RBC # BLD AUTO: 4.09 10*6/MM3 (ref 4.14–5.8)
RBC # UR: NORMAL /HPF
REF LAB TEST METHOD: NORMAL
SODIUM SERPL-SCNC: 137 MMOL/L (ref 136–145)
SP GR UR STRIP: 1.02 (ref 1–1.03)
SQUAMOUS #/AREA URNS HPF: NORMAL /HPF
UROBILINOGEN UR QL STRIP: ABNORMAL
WBC # BLD AUTO: 9.13 10*3/MM3 (ref 3.4–10.8)
WBC UR QL AUTO: NORMAL /HPF

## 2021-11-03 PROCEDURE — 80069 RENAL FUNCTION PANEL: CPT

## 2021-11-03 PROCEDURE — 81001 URINALYSIS AUTO W/SCOPE: CPT

## 2021-11-03 PROCEDURE — 36415 COLL VENOUS BLD VENIPUNCTURE: CPT

## 2021-11-03 PROCEDURE — 84156 ASSAY OF PROTEIN URINE: CPT

## 2021-11-03 PROCEDURE — 83970 ASSAY OF PARATHORMONE: CPT

## 2021-11-03 PROCEDURE — 85027 COMPLETE CBC AUTOMATED: CPT

## 2021-11-03 PROCEDURE — 82570 ASSAY OF URINE CREATININE: CPT

## 2021-11-03 PROCEDURE — 82306 VITAMIN D 25 HYDROXY: CPT

## 2021-11-29 RX ORDER — LANOLIN ALCOHOL/MO/W.PET/CERES
1000 CREAM (GRAM) TOPICAL DAILY
COMMUNITY
End: 2021-11-29

## 2021-11-29 RX ORDER — EAR PLUGS
1 EACH OTIC (EAR) EVERY 24 HOURS
Qty: 90 ML | Status: CANCELLED | OUTPATIENT
Start: 2021-11-29 | End: 2022-02-27

## 2021-11-29 RX ORDER — LANOLIN ALCOHOL/MO/W.PET/CERES
1000 CREAM (GRAM) TOPICAL DAILY
Qty: 90 TABLET | Refills: 0 | Status: SHIPPED | OUTPATIENT
Start: 2021-11-29 | End: 2022-02-10 | Stop reason: SDUPTHER

## 2021-11-29 RX ORDER — MONTELUKAST SODIUM 10 MG/1
10 TABLET ORAL NIGHTLY
Qty: 90 TABLET | Refills: 1 | Status: SHIPPED | OUTPATIENT
Start: 2021-11-29 | End: 2022-06-14 | Stop reason: SDUPTHER

## 2021-11-29 RX ORDER — OMEPRAZOLE 20 MG/1
20 CAPSULE, DELAYED RELEASE ORAL DAILY
Qty: 90 CAPSULE | Refills: 1 | Status: SHIPPED | OUTPATIENT
Start: 2021-11-29 | End: 2022-05-24 | Stop reason: SDUPTHER

## 2021-11-29 NOTE — TELEPHONE ENCOUNTER
Pt needs a refill on singulair 10mg qhs, omeprazole 20mg qd, cyanocobalamin 1,000mg qd send to [facility] 90 days      He had a bottle of omeprazole he brought in to get refilled but I do not see it on his med list?

## 2022-01-04 ENCOUNTER — LAB (OUTPATIENT)
Dept: LAB | Facility: HOSPITAL | Age: 77
End: 2022-01-04

## 2022-01-04 DIAGNOSIS — E11.9 TYPE 2 DIABETES MELLITUS WITHOUT COMPLICATION, WITHOUT LONG-TERM CURRENT USE OF INSULIN: ICD-10-CM

## 2022-01-04 DIAGNOSIS — N18.31 STAGE 3A CHRONIC KIDNEY DISEASE: ICD-10-CM

## 2022-01-04 DIAGNOSIS — I10 BENIGN ESSENTIAL HTN: ICD-10-CM

## 2022-01-04 LAB
ANION GAP SERPL CALCULATED.3IONS-SCNC: 8.9 MMOL/L (ref 5–15)
BUN SERPL-MCNC: 28 MG/DL (ref 8–23)
BUN/CREAT SERPL: 19.6 (ref 7–25)
CALCIUM SPEC-SCNC: 9.4 MG/DL (ref 8.6–10.5)
CHLORIDE SERPL-SCNC: 108 MMOL/L (ref 98–107)
CO2 SERPL-SCNC: 23.1 MMOL/L (ref 22–29)
CREAT SERPL-MCNC: 1.43 MG/DL (ref 0.76–1.27)
GFR SERPL CREATININE-BSD FRML MDRD: 48 ML/MIN/1.73
GLUCOSE SERPL-MCNC: 82 MG/DL (ref 65–99)
HBA1C MFR BLD: 6.59 % (ref 4.8–5.6)
POTASSIUM SERPL-SCNC: 5.3 MMOL/L (ref 3.5–5.2)
SODIUM SERPL-SCNC: 140 MMOL/L (ref 136–145)

## 2022-01-04 PROCEDURE — 36415 COLL VENOUS BLD VENIPUNCTURE: CPT

## 2022-01-04 PROCEDURE — 83036 HEMOGLOBIN GLYCOSYLATED A1C: CPT

## 2022-01-04 PROCEDURE — 80048 BASIC METABOLIC PNL TOTAL CA: CPT

## 2022-01-04 RX ORDER — CARVEDILOL 12.5 MG/1
12.5 TABLET ORAL 2 TIMES DAILY
Qty: 180 TABLET | Refills: 1 | Status: SHIPPED | OUTPATIENT
Start: 2022-01-04 | End: 2022-06-29 | Stop reason: SDUPTHER

## 2022-01-10 PROBLEM — E55.9 VITAMIN D DEFICIENCY: Status: ACTIVE | Noted: 2021-11-11

## 2022-01-10 PROBLEM — R60.9 EDEMA: Status: ACTIVE | Noted: 2021-11-11

## 2022-01-10 PROBLEM — I25.10 CORONARY ARTERIOSCLEROSIS: Status: ACTIVE | Noted: 2021-11-11

## 2022-01-11 ENCOUNTER — OFFICE VISIT (OUTPATIENT)
Dept: INTERNAL MEDICINE | Facility: CLINIC | Age: 77
End: 2022-01-11

## 2022-01-11 VITALS
SYSTOLIC BLOOD PRESSURE: 180 MMHG | HEART RATE: 75 BPM | OXYGEN SATURATION: 96 % | HEIGHT: 70 IN | BODY MASS INDEX: 33.07 KG/M2 | TEMPERATURE: 98.2 F | WEIGHT: 231 LBS | DIASTOLIC BLOOD PRESSURE: 89 MMHG

## 2022-01-11 DIAGNOSIS — Z87.11 HISTORY OF PEPTIC ULCER DISEASE: ICD-10-CM

## 2022-01-11 DIAGNOSIS — R53.83 OTHER FATIGUE: ICD-10-CM

## 2022-01-11 DIAGNOSIS — E11.9 TYPE 2 DIABETES MELLITUS WITHOUT COMPLICATION, WITHOUT LONG-TERM CURRENT USE OF INSULIN: ICD-10-CM

## 2022-01-11 DIAGNOSIS — D50.8 IRON DEFICIENCY ANEMIA SECONDARY TO INADEQUATE DIETARY IRON INTAKE: ICD-10-CM

## 2022-01-11 DIAGNOSIS — E87.5 HYPERKALEMIA: ICD-10-CM

## 2022-01-11 DIAGNOSIS — N18.31 STAGE 3A CHRONIC KIDNEY DISEASE: ICD-10-CM

## 2022-01-11 DIAGNOSIS — E78.2 MIXED HYPERLIPIDEMIA: ICD-10-CM

## 2022-01-11 DIAGNOSIS — I10 BENIGN ESSENTIAL HTN: Primary | ICD-10-CM

## 2022-01-11 DIAGNOSIS — J45.20 MILD INTERMITTENT ASTHMA WITHOUT COMPLICATION: ICD-10-CM

## 2022-01-11 PROCEDURE — 99214 OFFICE O/P EST MOD 30 MIN: CPT | Performed by: INTERNAL MEDICINE

## 2022-01-11 RX ORDER — MELATONIN
1 2 TIMES DAILY
COMMUNITY
End: 2022-04-22 | Stop reason: SINTOL

## 2022-01-11 RX ORDER — ASPIRIN 81 MG/1
TABLET, CHEWABLE ORAL
COMMUNITY

## 2022-01-11 RX ORDER — FUROSEMIDE 20 MG/1
20 TABLET ORAL DAILY PRN
Status: SHIPPED | COMMUNITY
Start: 2022-01-11 | End: 2022-02-01 | Stop reason: SDUPTHER

## 2022-01-11 RX ORDER — CANDESARTAN 32 MG/1
32 TABLET ORAL NIGHTLY
Qty: 90 TABLET | Refills: 0 | Status: SHIPPED | OUTPATIENT
Start: 2022-01-11 | End: 2022-04-05 | Stop reason: SDUPTHER

## 2022-01-11 RX ORDER — FERROUS SULFATE 325(65) MG
TABLET ORAL
COMMUNITY
Start: 2021-10-25 | End: 2022-02-09 | Stop reason: SDUPTHER

## 2022-01-11 RX ORDER — DICYCLOMINE HCL 20 MG
TABLET ORAL AS NEEDED
COMMUNITY

## 2022-01-11 RX ORDER — CETIRIZINE HYDROCHLORIDE 10 MG/1
10 TABLET ORAL EVERY MORNING
COMMUNITY
End: 2022-09-07 | Stop reason: SDUPTHER

## 2022-01-11 RX ORDER — ALBUTEROL SULFATE 90 UG/1
AEROSOL, METERED RESPIRATORY (INHALATION) AS NEEDED
COMMUNITY

## 2022-01-11 NOTE — ASSESSMENT & PLAN NOTE
A1c of 6.5 as of his 1/22 office visit.  This is stable, he is maintained on metformin, no changes needed, continue same.

## 2022-01-11 NOTE — ASSESSMENT & PLAN NOTE
This is mild but recurrent, and patient does have underlying CKD.  He is on ARB, but no recent increase in this dose.  He is going to try a low potassium diet, will repeat the lab in a month or so, may have to lower his ARB if not improving.

## 2022-01-11 NOTE — PATIENT INSTRUCTIONS
Potassium Content of Foods    Potassium is a mineral found in many foods and drinks. It affects how the heart works, and helps keep fluids and minerals balanced in the body.  The amount of potassium you need each day depends on your age and any medical conditions you may have. Talk to your health care provider or dietitian about how much potassium you need.  The following lists of foods provide the general serving size for foods and the approximate amount of potassium in each serving, listed in milligrams (mg). Actual values may vary depending on the product and how it is processed.  High in potassium  The following foods and beverages have 200 mg or more of potassium per serving:  · Apricots (raw) - 2 have 200 mg of potassium.  · Apricots (dry) - 5 have 200 mg of potassium.  · Artichoke - 1 medium has 345 mg of potassium.  · Avocado - ¼ fruit has 245 mg of potassium.  · Banana - 1 medium fruit has 425 mg of potassium.  · Lima or baked beans (canned) - ½ cup has 280 mg of potassium.  · White beans (canned) - ½ cup has 595 mg potassium.  · Beef roast - 3 oz has 320 mg of potassium.  · Ground beef - 3 oz has 270 mg of potassium.  · Beets (raw or cooked) - ½ cup has 260 mg of potassium.  · Bran muffin - 2 oz has 300 mg of potassium.  · Broccoli (cooked) - ½ cup has 230 mg of potassium.  · Claunch sprouts - ½ cup has 250 mg of potassium.  · Cantaloupe - ½ cup has 215 mg of potassium.  · Cereal, 100% bran - ½ cup has 200-400 mg of potassium.  · Cheeseburger -1 single fast food burger has 225-400 mg of potassium.  · Chicken - 3 oz has 220 mg of potassium.  · Clams (canned) - 3 oz has 535 mg of potassium.  · Crab - 3 oz has 225 mg of potassium.  · Dates - 5 have 270 mg of potassium.  · Dried beans and peas - ½ cup has 300-475 mg of potassium.  · Figs (dried) - 2 have 260 mg of potassium.  · Fish (halibut, tuna, cod, snapper) - 3 oz has 480 mg of potassium.  · Fish (salmon, rupal, swordfish, perch) - 3 oz has 300 mg of  potassium.  · Fish (tuna, canned) - 3 oz has 200 mg of potassium.  · French fries (fast food) - 3 oz has 470 mg of potassium.  · Granola with fruit and nuts - ½ cup has 200 mg of potassium.  · Grapefruit juice - ½ cup has 200 mg of potassium.  · Honeydew melon - ½ cup has 200 mg of potassium.  · Kale (raw) - 1 cup has 300 mg of potassium.  · Kiwi - 1 medium fruit has 240 mg of potassium.  · Kohlrabi, rutabaga, parsnips - ½ cup has 280 mg of potassium.  · Lentils - ½ cup has 365 mg of potassium.  · Eddy - 1 each has 325 mg of potassium.  · Milk (nonfat, low-fat, whole, buttermilk) - 1 cup has 350-380 mg of potassium.  · Milk (chocolate) - 1 cup has 420 mg of potassium  · Molasses - 1 Tbsp has 295 mg of potassium.  · Mushrooms - ½ cup has 280 mg of potassium.  · Nectarine - 1 each has 275 mg of potassium.  · Nuts (almonds, peanuts, hazelnuts, [place], cashew, mixed) - 1 oz has 200 mg of potassium.  · Nuts (pistachios) - 1 oz has 295 mg of potassium.  · Orange - 1 fruit has 240 mg of potassium.  · Orange juice - ½ cup has 235 mg of potassium.  · Papaya - ½ medium fruit has 390 mg of potassium.  · Peanut butter (chunky) - 2 Tbsp has 240 mg of potassium.  · Peanut butter (smooth) - 2 Tbsp has 210 mg of potassium.  · Pear - 1 medium (200 mg) of potassium.  · Pomegranate - 1 whole fruit has 400 mg of potassium.  · Pomegranate juice - ½ cup has 215 mg of potassium.  · Pork - 3 oz has 350 mg of potassium.  · Potato chips (salted) - 1 oz has 465 mg of potassium.  · Potato (baked with skin) - 1 medium has 925 mg of potassium.  · Potato (boiled) - ½ cup has 255 mg of potassium.  · Potato (Mashed) - ½ cup has 330 mg of potassium.  · Prune juice - ½ cup has 370 mg of potassium.  · Prunes - 5 have 305 mg of potassium.  · Pudding (chocolate) - ½ cup has 230 mg of potassium.  · Pumpkin (canned) - ½ cup has 250 mg of potassium.  · Raisins (seedless) - ¼ cup has 270 mg of potassium.  · Seeds (sunflower or pumpkin) - 1 oz has 240 mg of  potassium.  · Soy milk - 1 cup has 300 mg of potassium.  · Spinach (cooked) - 1/2 cup has 420 mg of potassium.  · Spinach (canned) - ½ cup has 370 mg of potassium.  · Sweet potato (baked with skin) - 1 medium has 450 mg of potassium.  · Swiss chard - ½ cup has 480 mg of potassium.  · Tomato or vegetable juice - ½ cup has 275 mg of potassium.  · Tomato (sauce or puree) - ½ cup has 400-550 mg of potassium.  · Tomato (raw) - 1 medium has 290 mg of potassium.  · Tomato (canned) - ½ cup has 200-300 mg of potassium.  · Turkey - 3 oz has 250 mg of potassium.  · Wheat germ - 1 oz has 250 mg of potassium.  · Winter squash - ½ cup has 250 mg of potassium.  · Yogurt (plain or fruited) - 6 oz has 260-435 mg of potassium.  · Zucchini - ½ cup has 220 mg of potassium.  Moderate in potassium  The following foods and beverages have  mg of potassium per serving:  · Apple - 1 fruit has 150 mg of potassium  · Apple juice - ½ cup has 150 mg of potassium  · Applesauce - ½ cup has 90 mg of potassium  · Apricot nectar - ½ cup has 140 mg of potassium  · Asparagus (small monterroso) - ½ cup has 155 mg of potassium  · Asparagus (large monterroso) - 6 have 155 mg of potassium  · Bagel (cinnamon raisin) - 1 four-inch bagel has 130 mg of potassium  · Bagel (egg or plain) - 1 four- inch bagel has 70 mg of potassium  · Beans (green) - ½ cup has 90 mg of potassium  · Beans (yellow) - ½ cup has 190 mg of potassium  · Beer, regular - 12 oz has 100 mg of potassium  · Beets (canned) - ½ cup has 125 mg of potassium  · Blackberries - ½ cup has 115 mg of potassium  · Blueberries - ½ cup has 60 mg of potassium  · Bread (whole wheat) - 1 slice has 70 mg of potassium  · Broccoli (raw) - ½ cup has 145 mg of potassium  · Cabbage - ½ cup has 150 mg of potassium  · Carrots (cooked or raw) - ½ cup has 180 mg of potassium  · Cauliflower (raw) - ½ cup has 150 mg of potassium  · Celery (raw) - ½ cup has 155 mg of potassium  · Cereal, bran flakes - ½ cup has 120-150 mg  of potassium  · Cheese (cottage) - ½ cup has 110 mg of potassium  · Cherries - 10 have 150 mg of potassium  · Chocolate - 1½ oz bar has 165 mg of potassium  · Coffee (brewed) - 6 oz has 90 mg of potassium  · Corn - ½ cup or 1 ear has 195 mg of potassium  · Cucumbers - ½ cup has 80 mg of potassium  · Egg - 1 large egg has 60 mg of potassium  · Eggplant - ½ cup has 60 mg of potassium  · Endive (raw) - ½ cup has 80 mg of potassium  · English muffin - 1 has 65 mg of potassium  · Fish (ocean perch) - 3 oz has 192 mg of potassium  · Frankfurter, beef or pork - 1 has 75 mg of potassium  · Fruit cocktail - ½ cup has 115 mg of potassium  · Grape juice - ½ cup has 170 mg of potassium  · Grapefruit - ½ fruit has 175 mg of potassium  · Grapes - ½ cup has 155 mg of potassium  · Greens: kale, turnip, juvenal - ½ cup has 110-150 mg of potassium  · Ice cream or frozen yogurt (chocolate) - ½ cup has 175 mg of potassium  · Ice cream or frozen yogurt (vanilla) - ½ cup has 120-150 mg of potassium  · Bolivar, limes - 1 each has 80 mg of potassium  · Lettuce - 1 cup has 100 mg of potassium  · Mixed vegetables - ½ cup has 150 mg of potassium  · Mushrooms, raw - ½ cup has 110 mg of potassium  · Nuts (walnuts, pecans, or macadamia) - 1 oz has 125 mg of potassium  · Oatmeal - ½ cup has 80 mg of potassium  · Okra - ½ cup has 110 mg of potassium  · Onions - ½ cup has 120 mg of potassium  · Peach - 1 has 185 mg of potassium  · Peaches (canned) - ½ cup has 120 mg of potassium  · Pears (canned) - ½ cup has 120 mg of potassium  · Peas, green (frozen) - ½ cup has 90 mg of potassium  · Peppers (Green) - ½ cup has 130 mg of potassium  · Peppers (Red) - ½ cup has 160 mg of potassium  · Pineapple juice - ½ cup has 165 mg of potassium  · Pineapple (fresh or canned) - ½ cup has 100 mg of potassium  · Plums - 1 has 105 mg of potassium  · Pudding, vanilla - ½ cup has 150 mg of potassium  · Raspberries - ½ cup has 90 mg of potassium  · Rhubarb - ½ cup has  115 mg of potassium  · Rice, wild - ½ cup has 80 mg of potassium  · Shrimp - 3 oz has 155 mg of potassium  · Spinach (raw) - 1 cup has 170 mg of potassium  · Strawberries - ½ cup has 125 mg of potassium  · Summer squash - ½ cup has 175-200 mg of potassium  · Swiss chard (raw) - 1 cup has 135 mg of potassium  · Tangerines - 1 fruit has 140 mg of potassium  · Tea, brewed - 6 oz has 65 mg of potassium  · Turnips - ½ cup has 140 mg of potassium  · Watermelon - ½ cup has 85 mg of potassium  · Wine (Red, table) - 5 oz has 180 mg of potassium  · Wine (White, table) - 5 oz 100 mg of potassium  Low in potassium  The following foods and beverages have less than 50 mg of potassium per serving.  · Bread (white) - 1 slice has 30 mg of potassium  · Carbonated beverages - 12 oz has less than 5 mg of potassium  · Cheese - 1 oz has 20-30 mg of potassium  · Cranberries - ½ cup has 45 mg of potassium  · Cranberry juice cocktail - ½ cup has 20 mg of potassium  · Fats and oils - 1 Tbsp has less than 5 mg of potassium  · Hummus - 1 Tbsp has 32 mg of potassium  · Nectar (papaya, rosette, or pear) - ½ cup has 35 mg of potassium  · Rice (white or brown) - ½ cup has 50 mg of potassium  · Spaghetti or macaroni (cooked) - ½ cup has 30 mg of potassium  · Tortilla, flour or corn - 1 has 50 mg of potassium  · Waffle - 1 four-inch waffle has 50 mg of potassium  · Water chestnuts - ½ cup has 40 mg of potassium  Summary  · Potassium is a mineral found in many foods and drinks. It affects how the heart works, and helps keep fluids and minerals balanced in the body.  · The amount of potassium you need each day depends on your age and any existing medical conditions you may have. Your health care provider or dietitian may recommend an amount of potassium that you should have each day.  This information is not intended to replace advice given to you by your health care provider. Make sure you discuss any questions you have with your health care  provider.  Document Revised: 11/30/2018 Document Reviewed: 03/14/2018  Elsevier Patient Education © 2021 [employer]

## 2022-01-11 NOTE — ASSESSMENT & PLAN NOTE
GFR of 48 as of 1/22 is improved, he was 42 when I saw him last year.  He stable this regard, we will continue to monitor on a routine basis.

## 2022-01-11 NOTE — PROGRESS NOTES
"Chief Complaint  Hypertension and Follow-up (No new issues)    Subjective          Telly Fitch presents to [facility]     History of Present Illness  Patient pleasant 76-year-old male with underlying hypertension, hyperlipidemia, chronic kidney disease stage 3a, as well as diabetes mellitus, who is coming in 1/22 for routine 4-month follow-up.  We will review his labs, address any new concerns, and make new recommendations at that time.    Review of Systems   Constitutional: Negative for appetite change, fatigue and fever.   HENT: Negative for congestion and ear pain.    Eyes: Negative for blurred vision.   Respiratory: Negative for cough, chest tightness, shortness of breath and wheezing.    Cardiovascular: Negative for chest pain, palpitations and leg swelling.   Gastrointestinal: Negative for abdominal pain.   Genitourinary: Negative for difficulty urinating, dysuria and hematuria.   Musculoskeletal: Negative for arthralgias and gait problem.   Skin: Negative for skin lesions.   Neurological: Negative for syncope, memory problem and confusion.   Psychiatric/Behavioral: Negative for self-injury and depressed mood.       Objective   Vital Signs:   /89   Pulse 75   Temp 98.2 °F (36.8 °C)   Ht 177.8 cm (70\")   Wt 105 kg (231 lb)   SpO2 96%   BMI 33.15 kg/m²           Physical Exam  Vitals and nursing note reviewed.   Constitutional:       General: He is not in acute distress.     Appearance: Normal appearance. He is not toxic-appearing.   HENT:      Head: Atraumatic.      Right Ear: External ear normal.      Left Ear: External ear normal.      Nose: Nose normal.      Mouth/Throat:      Mouth: Mucous membranes are moist.   Eyes:      General:         Right eye: No discharge.         Left eye: No discharge.      Extraocular Movements: Extraocular movements intact.      Pupils: Pupils are equal, round, and reactive to light.   Cardiovascular:      Rate and Rhythm: " Normal rate and regular rhythm.      Pulses: Normal pulses.      Heart sounds: Normal heart sounds. No murmur heard.  No gallop.    Pulmonary:      Effort: Pulmonary effort is normal. No respiratory distress.      Breath sounds: No wheezing, rhonchi or rales.   Abdominal:      General: There is no distension.      Palpations: Abdomen is soft. There is no mass.      Tenderness: There is no abdominal tenderness. There is no guarding.   Musculoskeletal:         General: No swelling or tenderness.      Cervical back: No tenderness.      Right lower leg: No edema.      Left lower leg: No edema.   Skin:     General: Skin is warm and dry.      Findings: No rash.   Neurological:      General: No focal deficit present.      Mental Status: He is alert and oriented to person, place, and time. Mental status is at baseline.      Motor: No weakness.      Gait: Gait normal.   Psychiatric:         Mood and Affect: Mood normal.         Thought Content: Thought content normal.          Result Review :   The following data was reviewed by: Anuel Fisher MD on 09/16/2021:                  Assessment and Plan    Diagnoses and all orders for this visit:    1. Benign essential HTN (Primary)  Assessment & Plan:  Blood pressure remains well controlled as of 9/21 office.  Patient is stable on doxazosin as well as Procardia.---> Nephrology added low-dose furosemide last year, but patient had to lower this to as needed due to orthostatic hypotension as of 1/22.  His blood pressure is up just today, it stable at home, no changes made.    Orders:  -     Comprehensive Metabolic Panel; Future    2. Mixed hyperlipidemia  Assessment & Plan:  LDL of 84 as of his 9/21 office visit is at goal.  Patient is stable on low-dose therapy, he is on pravastatin 20 mg only.  Continue same as of 1/22, lab on return to office.      Orders:  -     Lipid Panel; Future    3. Stage 3a chronic kidney disease (HCC)  Assessment & Plan:  GFR of 48 as of 1/22 is improved,  he was 42 when I saw him last year.  He stable this regard, we will continue to monitor on a routine basis.        4. Type 2 diabetes mellitus without complication, without long-term current use of insulin (HCC)  Assessment & Plan:  A1c of 6.5 as of his 1/22 office visit.  This is stable, he is maintained on metformin, no changes needed, continue same.    Orders:  -     Hemoglobin A1c; Future    5. Hyperkalemia  Assessment & Plan:  This is mild but recurrent, and patient does have underlying CKD.  He is on ARB, but no recent increase in this dose.  He is going to try a low potassium diet, will repeat the lab in a month or so, may have to lower his ARB if not improving.    Orders:  -     Basic Metabolic Panel; Future    6. History of peptic ulcer disease  Assessment & Plan:  Patient no dysphagia no significant dyspepsia on chronic PPI as of 1/22.  He is stable to continue low-dose of omeprazole.        7. Other fatigue  -     TSH; Future    8. Mild intermittent asthma without complication  Assessment & Plan:  Patient has Advair he uses in the summer months, and also has rescue inhaler that he basically never used to use.  He is maintained on Singulair and Zyrtec as well.  No changes to medicines indicated in 1/22.        9. Iron deficiency anemia secondary to inadequate dietary iron intake  Assessment & Plan:  Patient is on hopefully low-dose over-the-counter iron supplementation 3 times daily due to hematology's prior recommendations.  We need to repeat his studies here in a month with his follow-up potassium level and make further recommendations at that time.    Orders:  -     CBC & Differential; Future  -     Ferritin; Future  -     Iron Profile; Future  -     Ferritin; Future  -     CBC & Differential; Future  -     Iron Profile; Future     --  --  OLDER NOTES:  VISIT 5/21:  --  HTN typically controlled and please check at home...HCTZ added since last OV; currently high in AM, ? related to CPAP he quit using;  will change flomax to cardura and titrate; if no benefit, then will need to get back on CPAP...try 12.5 HCTZ given urinary c/o...seeing Dr Barrera and I rec 24-hr BP monitor and then review results with him on RTO ( ? related to not being on CPAP)...better 8/18, but will try off HCTZ given incont and increase cardura...has not needed PRN clonidine given at 1/19 urgent visit; stable 2/19...up 3/20 and will see if RYR helps=wife thinks it will...145/70 at home=bring wrist cuff on RTO...his cuff reads about 10 pts high top/bottom, but his numbers are up more at home as well; will max out cardura 9/20---> wel controlled 5/21.  CKD3a stable=1.6 (42%) and d/w no nsaids...stable 10/16 = 44%...54% nice...42% ? realted to recent stone tx...46% is holding...53%...48%...60% is nice to see as of 9/20 OV...55% is fine 1/21---> 42% is a bit concerning b/c of proteinuria, but not too far off his norm.  --  LIPIDS...he's maxed out as far as TG's go, but ; rec add fish oil...TG's down with wt loss and/or fish oil... and defer changes...110...99...114 ok for now 2/19...109 in 3/20 and will stop Tricor and try RYR with the statin...TG's 250 off tricor and on RYR; LDL only 70, so no changes needed---> 80 in 1/21.  CP is vague and has appt with cards next week or two...S/P CATH 4/13 with no signif lesion.  --  DM up some to 6.8, but no change meds needed...ditto for 7.0 after holidays...6.0 with wt loss, so lower amaryl to qd...5.5 so stop it...6.8 so resume 1/2 of 2 mg tab in AM only...he was on 1/2 bid up until just a week ago; A1C 6.0, so ok to stay on 1/2 bid...6.3...6.1...5.9 and rec only take 1/2 in AM...6.4 is fine and will stop it on RTO if same...6.0 and will stop the 2 mg of amaryl now=2/19...6.8...6.3 is better ballpark off it---> 6.3 is same in 5/21.  MICRO-ALB = 140 at 2/18 OV; on max ARB/CCB, so will just monitor for now---> was 500 in '20 and now is 5000 in 5/21 = to RENAL and I will get U/S.  MORBID OBESITY  down 25 past 6 mo to 226 at 6/16 OV--->225 holding.  --  PULM NODULES 11/14 (former tobacco screening)...no change 6/15 with f/u needed...LLL 5 mm is stable many years, but RUL 7 mm needs one more...neg and no further rec.  --  UGI BLEED=PUD s/p inject...Dr Duarte 12/12 with neg EGD...Hgb recovered...12.5 so resume iron...13.1 better and likely== CKD...12.7 is fine...13 +  FE DEF ANEMIA and I rec lower to qd for ferritin of 310 at 10/17 OV...12.2 and ferritin better, so stay on qd...had scopes and has f/u with Dr Duarte for path and labs as of 5/18 OV...12.2... 12.7 and ok to stay on for now...12.1 with stable iron is ok...11.4 is wrong direction, so will ask Dr Arguello to eval...she has him on more iron and B12; had capsule endoscopy neg '19 as well=defer to Dr Arguello---> 12.7 in 1/21 with stable iron.  LFT's with mild bump that Dr Duarte noted as well...wnl...still wnl, but agree with checking Hep C... Hep B/C neg.  GERD w/o dysphagia.  --  A.R.....on zyrtec/nasonex/singulair, and sees Dr Charles...had CT per Dr Mosqueda (ENT).  RAD w/o flare.   RUPESH on CPAP @ 15 cmH2O...quit using it past year b/c issues with getting parts?  --  URGENT VISIT 7/19 and 11/20:  R LEG PAIN=twisted knee a few weeks ago, and then fell in hole yesterday; throbs at rest, and painful to walk on; no swelling in knee, and actually says pain is moreso in the hip; decent ROM, but has point tenderness in the greater trochanter; per orders and call Monday.  LBP into L LEG=I d/w him it's not the hip=saw Dr Been last month; no change bowel/bladder; is tender L greater trochanter, neg SLR; will get into PTA for L hip and LBP and add gabapentin/medrol...sxs resolved with just gabapentin and ? with passing kidney stone=d/w try weaning off it now...sxs in L hand and in L leg as of 3/20 OV that may be overuse syndrome=laying tile/etc for past 3 months; will get NCS given weak  and refer to Dr Randhawa again if worse.  --  KIDNEY STONES per a   Reema.  BPH/OAB per Urology in [place].  --  PSA 0.3 (7/11/12)...defer  COLON/EGD 4/18...polyps x1...f/u per Dr Duarte...capsule endo neg '19.  Pneumovax x 1; Prevnar rec 10/16 and 8/18. Hep A x1-2.  Neither of them are interested in Covid vaccine.  (, my pt)    Follow Up   Return in about 6 months (around 7/11/2022).  Patient was given instructions and counseling regarding his condition or for health maintenance advice. Please see specific information pulled into the AVS if appropriate.

## 2022-01-11 NOTE — ASSESSMENT & PLAN NOTE
Patient is on hopefully low-dose over-the-counter iron supplementation 3 times daily due to hematology's prior recommendations.  We need to repeat his studies here in a month with his follow-up potassium level and make further recommendations at that time.

## 2022-01-11 NOTE — ASSESSMENT & PLAN NOTE
Patient no dysphagia no significant dyspepsia on chronic PPI as of 1/22.  He is stable to continue low-dose of omeprazole.

## 2022-01-11 NOTE — ASSESSMENT & PLAN NOTE
Patient has Advair he uses in the summer months, and also has rescue inhaler that he basically never used to use.  He is maintained on Singulair and Zyrtec as well.  No changes to medicines indicated in 1/22.

## 2022-01-11 NOTE — ASSESSMENT & PLAN NOTE
LDL of 84 as of his 9/21 office visit is at goal.  Patient is stable on low-dose therapy, he is on pravastatin 20 mg only.  Continue same as of 1/22, lab on return to office.

## 2022-01-19 RX ORDER — NIFEDIPINE 90 MG/1
90 TABLET, EXTENDED RELEASE ORAL DAILY
Qty: 90 TABLET | Refills: 1 | Status: SHIPPED | OUTPATIENT
Start: 2022-01-19 | End: 2022-07-18 | Stop reason: SDUPTHER

## 2022-01-27 ENCOUNTER — TELEPHONE (OUTPATIENT)
Dept: INTERNAL MEDICINE | Facility: CLINIC | Age: 77
End: 2022-01-27

## 2022-01-27 RX ORDER — CEFDINIR 300 MG/1
300 CAPSULE ORAL 2 TIMES DAILY
Qty: 20 CAPSULE | Refills: 0 | Status: SHIPPED | OUTPATIENT
Start: 2022-01-27 | End: 2022-04-22

## 2022-01-27 NOTE — TELEPHONE ENCOUNTER
Same thing, I sent the same antibiotic over for him, but they both need to consider that this could be Covid and isolate and/or get tested.

## 2022-01-27 NOTE — TELEPHONE ENCOUNTER
Pt has pain under his eyes and pressure,runny nose. He wants to know if you will call him something in? Jocelynn

## 2022-02-01 RX ORDER — FUROSEMIDE 20 MG/1
20 TABLET ORAL DAILY PRN
Qty: 90 TABLET | Refills: 1 | Status: SHIPPED | OUTPATIENT
Start: 2022-02-01 | End: 2023-01-16 | Stop reason: SDUPTHER

## 2022-02-01 NOTE — TELEPHONE ENCOUNTER
Caller: DANIEL PAULINOA    Relationship: Emergency Contact    Best call back number: [phone]     Requested Prescriptions:   Requested Prescriptions     Pending Prescriptions Disp Refills   • furosemide (LASIX) 20 MG tablet       Sig: Take 1 tablet by mouth Daily As Needed.        Pharmacy where request should be sent: [facility], [place]  [address] - [phone] [facility] [phone] FX       Does the patient have less than a 3 day supply:  [x] Yes  [] No    Ammy Fisher Rep   02/01/22 09:15 EST

## 2022-02-09 RX ORDER — FERROUS SULFATE 325(65) MG
1 TABLET ORAL
Qty: 270 TABLET | Refills: 1 | Status: SHIPPED | OUTPATIENT
Start: 2022-02-09 | End: 2022-05-10

## 2022-02-09 NOTE — TELEPHONE ENCOUNTER
Pt needs a refill on ferrous sulfate 65mg TID, He was given the wrong medication refill by the [facility], his wife was very upset because she said they would not isten to her and refused to let her talk to one of us at the office and then they put the wrong medication in. She wanted to make sure that you knew this.  Myriam breen

## 2022-02-10 RX ORDER — LANOLIN ALCOHOL/MO/W.PET/CERES
1000 CREAM (GRAM) TOPICAL DAILY
Qty: 90 TABLET | Refills: 1 | Status: SHIPPED | OUTPATIENT
Start: 2022-02-10 | End: 2022-05-11

## 2022-02-11 ENCOUNTER — LAB (OUTPATIENT)
Dept: LAB | Facility: HOSPITAL | Age: 77
End: 2022-02-11

## 2022-02-11 DIAGNOSIS — E87.5 HYPERKALEMIA: ICD-10-CM

## 2022-02-11 DIAGNOSIS — D50.8 IRON DEFICIENCY ANEMIA SECONDARY TO INADEQUATE DIETARY IRON INTAKE: ICD-10-CM

## 2022-02-11 LAB
ANION GAP SERPL CALCULATED.3IONS-SCNC: 11 MMOL/L (ref 5–15)
BASOPHILS # BLD AUTO: 0.07 10*3/MM3 (ref 0–0.2)
BASOPHILS NFR BLD AUTO: 0.9 % (ref 0–1.5)
BUN SERPL-MCNC: 37 MG/DL (ref 8–23)
BUN/CREAT SERPL: 23.1 (ref 7–25)
CALCIUM SPEC-SCNC: 9.6 MG/DL (ref 8.6–10.5)
CHLORIDE SERPL-SCNC: 105 MMOL/L (ref 98–107)
CO2 SERPL-SCNC: 21 MMOL/L (ref 22–29)
CREAT SERPL-MCNC: 1.6 MG/DL (ref 0.76–1.27)
DEPRECATED RDW RBC AUTO: 43.2 FL (ref 37–54)
EOSINOPHIL # BLD AUTO: 0.31 10*3/MM3 (ref 0–0.4)
EOSINOPHIL NFR BLD AUTO: 4 % (ref 0.3–6.2)
ERYTHROCYTE [DISTWIDTH] IN BLOOD BY AUTOMATED COUNT: 12.9 % (ref 12.3–15.4)
FERRITIN SERPL-MCNC: 427 NG/ML (ref 30–400)
GFR SERPL CREATININE-BSD FRML MDRD: 42 ML/MIN/1.73
GLUCOSE SERPL-MCNC: 105 MG/DL (ref 65–99)
HCT VFR BLD AUTO: 38.5 % (ref 37.5–51)
HGB BLD-MCNC: 12.8 G/DL (ref 13–17.7)
IMM GRANULOCYTES # BLD AUTO: 0.02 10*3/MM3 (ref 0–0.05)
IMM GRANULOCYTES NFR BLD AUTO: 0.3 % (ref 0–0.5)
IRON 24H UR-MRATE: 88 MCG/DL (ref 59–158)
IRON SATN MFR SERPL: 29 % (ref 20–50)
LYMPHOCYTES # BLD AUTO: 2.06 10*3/MM3 (ref 0.7–3.1)
LYMPHOCYTES NFR BLD AUTO: 26.9 % (ref 19.6–45.3)
MCH RBC QN AUTO: 30.9 PG (ref 26.6–33)
MCHC RBC AUTO-ENTMCNC: 33.2 G/DL (ref 31.5–35.7)
MCV RBC AUTO: 93 FL (ref 79–97)
MONOCYTES # BLD AUTO: 0.59 10*3/MM3 (ref 0.1–0.9)
MONOCYTES NFR BLD AUTO: 7.7 % (ref 5–12)
NEUTROPHILS NFR BLD AUTO: 4.61 10*3/MM3 (ref 1.7–7)
NEUTROPHILS NFR BLD AUTO: 60.2 % (ref 42.7–76)
NRBC BLD AUTO-RTO: 0 /100 WBC (ref 0–0.2)
PLATELET # BLD AUTO: 215 10*3/MM3 (ref 140–450)
PMV BLD AUTO: 11.2 FL (ref 6–12)
POTASSIUM SERPL-SCNC: 5.5 MMOL/L (ref 3.5–5.2)
RBC # BLD AUTO: 4.14 10*6/MM3 (ref 4.14–5.8)
SODIUM SERPL-SCNC: 137 MMOL/L (ref 136–145)
TIBC SERPL-MCNC: 308 MCG/DL (ref 298–536)
TRANSFERRIN SERPL-MCNC: 207 MG/DL (ref 200–360)
WBC NRBC COR # BLD: 7.66 10*3/MM3 (ref 3.4–10.8)

## 2022-02-11 PROCEDURE — 85025 COMPLETE CBC W/AUTO DIFF WBC: CPT

## 2022-02-11 PROCEDURE — 84466 ASSAY OF TRANSFERRIN: CPT

## 2022-02-11 PROCEDURE — 82728 ASSAY OF FERRITIN: CPT

## 2022-02-11 PROCEDURE — 80048 BASIC METABOLIC PNL TOTAL CA: CPT

## 2022-02-11 PROCEDURE — 83540 ASSAY OF IRON: CPT

## 2022-02-11 PROCEDURE — 36415 COLL VENOUS BLD VENIPUNCTURE: CPT

## 2022-02-23 RX ORDER — FINASTERIDE 5 MG/1
5 TABLET, FILM COATED ORAL DAILY
Qty: 90 TABLET | Refills: 1 | Status: SHIPPED | OUTPATIENT
Start: 2022-02-23 | End: 2022-08-25 | Stop reason: SDUPTHER

## 2022-03-07 ENCOUNTER — LAB (OUTPATIENT)
Dept: LAB | Facility: HOSPITAL | Age: 77
End: 2022-03-07

## 2022-03-07 ENCOUNTER — TRANSCRIBE ORDERS (OUTPATIENT)
Dept: ADMINISTRATIVE | Facility: HOSPITAL | Age: 77
End: 2022-03-07

## 2022-03-07 DIAGNOSIS — N18.32 CHRONIC KIDNEY DISEASE (CKD) STAGE G3B/A1, MODERATELY DECREASED GLOMERULAR FILTRATION RATE (GFR) BETWEEN 30-44 ML/MIN/1.73 SQUARE METER AND ALBUMINURIA CREATININE RATIO LESS THAN 30 MG/G (CMS/H*: ICD-10-CM

## 2022-03-07 DIAGNOSIS — N18.32 CHRONIC KIDNEY DISEASE (CKD) STAGE G3B/A1, MODERATELY DECREASED GLOMERULAR FILTRATION RATE (GFR) BETWEEN 30-44 ML/MIN/1.73 SQUARE METER AND ALBUMINURIA CREATININE RATIO LESS THAN 30 MG/G (CMS/H*: Primary | ICD-10-CM

## 2022-03-07 LAB
ALBUMIN SERPL-MCNC: 3.8 G/DL (ref 3.5–5.2)
ANION GAP SERPL CALCULATED.3IONS-SCNC: 12.8 MMOL/L (ref 5–15)
BACTERIA UR QL AUTO: NORMAL /HPF
BASOPHILS # BLD AUTO: 0.04 10*3/MM3 (ref 0–0.2)
BASOPHILS NFR BLD AUTO: 0.4 % (ref 0–1.5)
BILIRUB UR QL STRIP: NEGATIVE
BUN SERPL-MCNC: 29 MG/DL (ref 8–23)
BUN/CREAT SERPL: 21.2 (ref 7–25)
CALCIUM SPEC-SCNC: 9.2 MG/DL (ref 8.6–10.5)
CHLORIDE SERPL-SCNC: 107 MMOL/L (ref 98–107)
CLARITY UR: CLEAR
CO2 SERPL-SCNC: 21.2 MMOL/L (ref 22–29)
COLOR UR: YELLOW
CREAT SERPL-MCNC: 1.37 MG/DL (ref 0.76–1.27)
CREAT UR-MCNC: 101.4 MG/DL
DEPRECATED RDW RBC AUTO: 45.5 FL (ref 37–54)
EGFRCR SERPLBLD CKD-EPI 2021: 54.1 ML/MIN/1.73
EOSINOPHIL # BLD AUTO: 0.29 10*3/MM3 (ref 0–0.4)
EOSINOPHIL NFR BLD AUTO: 3 % (ref 0.3–6.2)
ERYTHROCYTE [DISTWIDTH] IN BLOOD BY AUTOMATED COUNT: 13 % (ref 12.3–15.4)
GLUCOSE SERPL-MCNC: 91 MG/DL (ref 65–99)
GLUCOSE UR STRIP-MCNC: NEGATIVE MG/DL
HCT VFR BLD AUTO: 38.9 % (ref 37.5–51)
HGB BLD-MCNC: 12.7 G/DL (ref 13–17.7)
HGB UR QL STRIP.AUTO: NEGATIVE
HYALINE CASTS UR QL AUTO: NORMAL /LPF
IMM GRANULOCYTES # BLD AUTO: 0.03 10*3/MM3 (ref 0–0.05)
IMM GRANULOCYTES NFR BLD AUTO: 0.3 % (ref 0–0.5)
KETONES UR QL STRIP: NEGATIVE
LEUKOCYTE ESTERASE UR QL STRIP.AUTO: NEGATIVE
LYMPHOCYTES # BLD AUTO: 2.16 10*3/MM3 (ref 0.7–3.1)
LYMPHOCYTES NFR BLD AUTO: 22.7 % (ref 19.6–45.3)
MCH RBC QN AUTO: 31.1 PG (ref 26.6–33)
MCHC RBC AUTO-ENTMCNC: 32.6 G/DL (ref 31.5–35.7)
MCV RBC AUTO: 95.1 FL (ref 79–97)
MONOCYTES # BLD AUTO: 0.6 10*3/MM3 (ref 0.1–0.9)
MONOCYTES NFR BLD AUTO: 6.3 % (ref 5–12)
NEUTROPHILS NFR BLD AUTO: 6.41 10*3/MM3 (ref 1.7–7)
NEUTROPHILS NFR BLD AUTO: 67.3 % (ref 42.7–76)
NITRITE UR QL STRIP: NEGATIVE
NRBC BLD AUTO-RTO: 0 /100 WBC (ref 0–0.2)
PH UR STRIP.AUTO: 5.5 [PH] (ref 5–8)
PHOSPHATE SERPL-MCNC: 3 MG/DL (ref 2.5–4.5)
PLATELET # BLD AUTO: 185 10*3/MM3 (ref 140–450)
PMV BLD AUTO: 12.2 FL (ref 6–12)
POTASSIUM SERPL-SCNC: 4.9 MMOL/L (ref 3.5–5.2)
PROT ?TM UR-MCNC: 487.5 MG/DL
PROT UR QL STRIP: ABNORMAL
PROT/CREAT UR: 4.81 MG/G{CREAT}
RBC # BLD AUTO: 4.09 10*6/MM3 (ref 4.14–5.8)
RBC # UR STRIP: NORMAL /HPF
REF LAB TEST METHOD: NORMAL
SODIUM SERPL-SCNC: 141 MMOL/L (ref 136–145)
SP GR UR STRIP: 1.02 (ref 1–1.03)
SQUAMOUS #/AREA URNS HPF: NORMAL /HPF
UROBILINOGEN UR QL STRIP: ABNORMAL
WBC # UR STRIP: NORMAL /HPF
WBC NRBC COR # BLD: 9.53 10*3/MM3 (ref 3.4–10.8)

## 2022-03-07 PROCEDURE — 36415 COLL VENOUS BLD VENIPUNCTURE: CPT

## 2022-03-07 PROCEDURE — 84156 ASSAY OF PROTEIN URINE: CPT

## 2022-03-07 PROCEDURE — 80069 RENAL FUNCTION PANEL: CPT

## 2022-03-07 PROCEDURE — 81001 URINALYSIS AUTO W/SCOPE: CPT

## 2022-03-07 PROCEDURE — 85025 COMPLETE CBC W/AUTO DIFF WBC: CPT

## 2022-03-07 PROCEDURE — 82570 ASSAY OF URINE CREATININE: CPT

## 2022-03-07 RX ORDER — PRAVASTATIN SODIUM 20 MG
20 TABLET ORAL NIGHTLY
Qty: 90 TABLET | Refills: 1 | Status: SHIPPED | OUTPATIENT
Start: 2022-03-07 | End: 2022-08-29 | Stop reason: SDUPTHER

## 2022-03-07 RX ORDER — LEVOTHYROXINE SODIUM 175 UG/1
175 TABLET ORAL DAILY
Qty: 90 TABLET | Refills: 1 | Status: SHIPPED | OUTPATIENT
Start: 2022-03-07 | End: 2022-03-11

## 2022-03-07 RX ORDER — LEVOTHYROXINE SODIUM 175 UG/1
175 TABLET ORAL DAILY
COMMUNITY
End: 2022-03-07 | Stop reason: SDUPTHER

## 2022-03-11 NOTE — TELEPHONE ENCOUNTER
Pt needs a refill on Loratadine 10mg qd 90 days send to [facility]  All I see in his chart  is Zyrtec, but the pt brought in a empty bottle that had loratadine on it. Can we send this in?

## 2022-03-14 RX ORDER — LORATADINE 10 MG/1
10 TABLET ORAL DAILY
COMMUNITY
End: 2022-03-14 | Stop reason: SDUPTHER

## 2022-03-15 RX ORDER — LORATADINE 10 MG/1
10 TABLET ORAL DAILY
Qty: 90 TABLET | Refills: 1 | Status: SHIPPED | OUTPATIENT
Start: 2022-03-15 | End: 2022-09-06 | Stop reason: SDUPTHER

## 2022-04-05 RX ORDER — CANDESARTAN 32 MG/1
32 TABLET ORAL NIGHTLY
Qty: 90 TABLET | Refills: 1 | Status: SHIPPED | OUTPATIENT
Start: 2022-04-05 | End: 2022-09-28 | Stop reason: SDUPTHER

## 2022-04-05 RX ORDER — TOLTERODINE TARTRATE 2 MG/1
4 TABLET, EXTENDED RELEASE ORAL DAILY
Qty: 180 TABLET | Refills: 1 | Status: SHIPPED | OUTPATIENT
Start: 2022-04-05 | End: 2022-07-04

## 2022-04-20 ENCOUNTER — TELEPHONE (OUTPATIENT)
Dept: INTERNAL MEDICINE | Facility: CLINIC | Age: 77
End: 2022-04-20

## 2022-04-20 RX ORDER — DOXAZOSIN 2 MG/1
8 TABLET ORAL EVERY 24 HOURS
Qty: 360 TABLET | Refills: 3 | Status: SHIPPED | OUTPATIENT
Start: 2022-04-20 | End: 2023-01-16

## 2022-04-22 ENCOUNTER — TELEPHONE (OUTPATIENT)
Dept: INTERNAL MEDICINE | Facility: CLINIC | Age: 77
End: 2022-04-22

## 2022-04-22 RX ORDER — VITAMIN E 268 MG
400 CAPSULE ORAL DAILY
COMMUNITY

## 2022-04-22 RX ORDER — ZINC GLUCONATE 50 MG
TABLET ORAL DAILY
COMMUNITY

## 2022-04-22 RX ORDER — DIPHENOXYLATE HYDROCHLORIDE AND ATROPINE SULFATE 2.5; .025 MG/1; MG/1
TABLET ORAL DAILY
COMMUNITY

## 2022-04-25 DIAGNOSIS — E11.9 TYPE 2 DIABETES MELLITUS WITHOUT COMPLICATION, WITHOUT LONG-TERM CURRENT USE OF INSULIN: Primary | ICD-10-CM

## 2022-04-27 RX ORDER — AMPICILLIN TRIHYDRATE 250 MG
1 CAPSULE ORAL DAILY
Qty: 90 EACH | Refills: 1
Start: 2022-04-27

## 2022-05-24 DIAGNOSIS — K21.9 MILD ACID REFLUX: Primary | ICD-10-CM

## 2022-05-24 RX ORDER — OMEPRAZOLE 20 MG/1
20 CAPSULE, DELAYED RELEASE ORAL DAILY
Qty: 90 CAPSULE | Refills: 3 | Status: SHIPPED | OUTPATIENT
Start: 2022-05-24

## 2022-05-24 NOTE — TELEPHONE ENCOUNTER
Patient is needing a refill on Omeprazole 20 MG     [facility] pharmacy    Patient's wife states he gets 3 refills on this?

## 2022-06-14 DIAGNOSIS — J30.1 SEASONAL ALLERGIC RHINITIS DUE TO POLLEN: Primary | ICD-10-CM

## 2022-06-14 RX ORDER — MONTELUKAST SODIUM 10 MG/1
10 TABLET ORAL NIGHTLY
Qty: 90 TABLET | Refills: 3 | Status: SHIPPED | OUTPATIENT
Start: 2022-06-14

## 2022-06-29 DIAGNOSIS — Z86.79 HISTORY OF HIGH BLOOD PRESSURE: Primary | ICD-10-CM

## 2022-06-30 RX ORDER — CARVEDILOL 12.5 MG/1
12.5 TABLET ORAL 2 TIMES DAILY
Qty: 180 TABLET | Refills: 1 | Status: SHIPPED | OUTPATIENT
Start: 2022-06-30 | End: 2022-12-28 | Stop reason: SDUPTHER

## 2022-07-05 ENCOUNTER — LAB (OUTPATIENT)
Dept: LAB | Facility: HOSPITAL | Age: 77
End: 2022-07-05

## 2022-07-05 DIAGNOSIS — I10 BENIGN ESSENTIAL HTN: ICD-10-CM

## 2022-07-05 DIAGNOSIS — E78.2 MIXED HYPERLIPIDEMIA: ICD-10-CM

## 2022-07-05 DIAGNOSIS — R53.83 OTHER FATIGUE: ICD-10-CM

## 2022-07-05 DIAGNOSIS — D50.8 IRON DEFICIENCY ANEMIA SECONDARY TO INADEQUATE DIETARY IRON INTAKE: ICD-10-CM

## 2022-07-05 DIAGNOSIS — E11.9 TYPE 2 DIABETES MELLITUS WITHOUT COMPLICATION, WITHOUT LONG-TERM CURRENT USE OF INSULIN: ICD-10-CM

## 2022-07-05 LAB
ALBUMIN SERPL-MCNC: 4 G/DL (ref 3.5–5.2)
ALBUMIN/GLOB SERPL: 1.7 G/DL
ALP SERPL-CCNC: 39 U/L (ref 39–117)
ALT SERPL W P-5'-P-CCNC: 16 U/L (ref 1–41)
ANION GAP SERPL CALCULATED.3IONS-SCNC: 11.5 MMOL/L (ref 5–15)
AST SERPL-CCNC: 18 U/L (ref 1–40)
BASOPHILS # BLD AUTO: 0.06 10*3/MM3 (ref 0–0.2)
BASOPHILS NFR BLD AUTO: 0.9 % (ref 0–1.5)
BILIRUB SERPL-MCNC: 0.3 MG/DL (ref 0–1.2)
BUN SERPL-MCNC: 28 MG/DL (ref 8–23)
BUN/CREAT SERPL: 17.1 (ref 7–25)
CALCIUM SPEC-SCNC: 9.1 MG/DL (ref 8.6–10.5)
CHLORIDE SERPL-SCNC: 106 MMOL/L (ref 98–107)
CHOLEST SERPL-MCNC: 139 MG/DL (ref 0–200)
CO2 SERPL-SCNC: 22.5 MMOL/L (ref 22–29)
CREAT SERPL-MCNC: 1.64 MG/DL (ref 0.76–1.27)
DEPRECATED RDW RBC AUTO: 41.3 FL (ref 37–54)
EGFRCR SERPLBLD CKD-EPI 2021: 43.6 ML/MIN/1.73
EOSINOPHIL # BLD AUTO: 0.24 10*3/MM3 (ref 0–0.4)
EOSINOPHIL NFR BLD AUTO: 3.4 % (ref 0.3–6.2)
ERYTHROCYTE [DISTWIDTH] IN BLOOD BY AUTOMATED COUNT: 12.7 % (ref 12.3–15.4)
FERRITIN SERPL-MCNC: 536 NG/ML (ref 30–400)
GLOBULIN UR ELPH-MCNC: 2.4 GM/DL
GLUCOSE SERPL-MCNC: 121 MG/DL (ref 65–99)
HBA1C MFR BLD: 6.3 % (ref 4.8–5.6)
HCT VFR BLD AUTO: 33.4 % (ref 37.5–51)
HDLC SERPL-MCNC: 29 MG/DL (ref 40–60)
HGB BLD-MCNC: 11.5 G/DL (ref 13–17.7)
IMM GRANULOCYTES # BLD AUTO: 0.03 10*3/MM3 (ref 0–0.05)
IMM GRANULOCYTES NFR BLD AUTO: 0.4 % (ref 0–0.5)
IRON 24H UR-MRATE: 80 MCG/DL (ref 59–158)
IRON SATN MFR SERPL: 25 % (ref 20–50)
LDLC SERPL CALC-MCNC: 74 MG/DL (ref 0–100)
LDLC/HDLC SERPL: 2.32 {RATIO}
LYMPHOCYTES # BLD AUTO: 2.09 10*3/MM3 (ref 0.7–3.1)
LYMPHOCYTES NFR BLD AUTO: 29.7 % (ref 19.6–45.3)
MCH RBC QN AUTO: 31.4 PG (ref 26.6–33)
MCHC RBC AUTO-ENTMCNC: 34.4 G/DL (ref 31.5–35.7)
MCV RBC AUTO: 91.3 FL (ref 79–97)
MONOCYTES # BLD AUTO: 0.56 10*3/MM3 (ref 0.1–0.9)
MONOCYTES NFR BLD AUTO: 8 % (ref 5–12)
NEUTROPHILS NFR BLD AUTO: 4.06 10*3/MM3 (ref 1.7–7)
NEUTROPHILS NFR BLD AUTO: 57.6 % (ref 42.7–76)
NRBC BLD AUTO-RTO: 0 /100 WBC (ref 0–0.2)
PLATELET # BLD AUTO: 197 10*3/MM3 (ref 140–450)
PMV BLD AUTO: 10.9 FL (ref 6–12)
POTASSIUM SERPL-SCNC: 5.6 MMOL/L (ref 3.5–5.2)
PROT SERPL-MCNC: 6.4 G/DL (ref 6–8.5)
RBC # BLD AUTO: 3.66 10*6/MM3 (ref 4.14–5.8)
SODIUM SERPL-SCNC: 140 MMOL/L (ref 136–145)
TIBC SERPL-MCNC: 316 MCG/DL (ref 298–536)
TRANSFERRIN SERPL-MCNC: 212 MG/DL (ref 200–360)
TRIGL SERPL-MCNC: 214 MG/DL (ref 0–150)
TSH SERPL DL<=0.05 MIU/L-ACNC: 2.1 UIU/ML (ref 0.27–4.2)
VLDLC SERPL-MCNC: 36 MG/DL (ref 5–40)
WBC NRBC COR # BLD: 7.04 10*3/MM3 (ref 3.4–10.8)

## 2022-07-05 PROCEDURE — 80053 COMPREHEN METABOLIC PANEL: CPT

## 2022-07-05 PROCEDURE — 36415 COLL VENOUS BLD VENIPUNCTURE: CPT

## 2022-07-05 PROCEDURE — 83036 HEMOGLOBIN GLYCOSYLATED A1C: CPT

## 2022-07-05 PROCEDURE — 83540 ASSAY OF IRON: CPT

## 2022-07-05 PROCEDURE — 80061 LIPID PANEL: CPT

## 2022-07-05 PROCEDURE — 84443 ASSAY THYROID STIM HORMONE: CPT

## 2022-07-05 PROCEDURE — 82728 ASSAY OF FERRITIN: CPT

## 2022-07-05 PROCEDURE — 84466 ASSAY OF TRANSFERRIN: CPT

## 2022-07-05 PROCEDURE — 85025 COMPLETE CBC W/AUTO DIFF WBC: CPT

## 2022-07-09 PROBLEM — R60.9 EDEMA: Status: RESOLVED | Noted: 2021-11-11 | Resolved: 2022-07-09

## 2022-07-09 PROBLEM — H92.01 OTALGIA OF RIGHT EAR: Status: RESOLVED | Noted: 2021-09-12 | Resolved: 2022-07-09

## 2022-07-11 ENCOUNTER — OFFICE VISIT (OUTPATIENT)
Dept: INTERNAL MEDICINE | Facility: CLINIC | Age: 77
End: 2022-07-11

## 2022-07-11 VITALS
WEIGHT: 219.2 LBS | HEART RATE: 83 BPM | SYSTOLIC BLOOD PRESSURE: 142 MMHG | OXYGEN SATURATION: 93 % | BODY MASS INDEX: 31.38 KG/M2 | DIASTOLIC BLOOD PRESSURE: 82 MMHG | TEMPERATURE: 97 F | HEIGHT: 70 IN

## 2022-07-11 DIAGNOSIS — D50.8 IRON DEFICIENCY ANEMIA SECONDARY TO INADEQUATE DIETARY IRON INTAKE: ICD-10-CM

## 2022-07-11 DIAGNOSIS — E55.9 VITAMIN D DEFICIENCY: ICD-10-CM

## 2022-07-11 DIAGNOSIS — Z00.00 WELL ADULT EXAM: ICD-10-CM

## 2022-07-11 DIAGNOSIS — E87.5 HYPERKALEMIA: Primary | ICD-10-CM

## 2022-07-11 DIAGNOSIS — E11.9 TYPE 2 DIABETES MELLITUS WITHOUT COMPLICATION, WITHOUT LONG-TERM CURRENT USE OF INSULIN: ICD-10-CM

## 2022-07-11 DIAGNOSIS — I10 BENIGN ESSENTIAL HTN: ICD-10-CM

## 2022-07-11 DIAGNOSIS — E78.2 MIXED HYPERLIPIDEMIA: ICD-10-CM

## 2022-07-11 DIAGNOSIS — N18.31 STAGE 3A CHRONIC KIDNEY DISEASE: ICD-10-CM

## 2022-07-11 PROCEDURE — 99214 OFFICE O/P EST MOD 30 MIN: CPT | Performed by: INTERNAL MEDICINE

## 2022-07-11 RX ORDER — TOLTERODINE 2 MG/1
CAPSULE, EXTENDED RELEASE ORAL
COMMUNITY
Start: 2022-04-07 | End: 2022-10-17 | Stop reason: SDUPTHER

## 2022-07-11 RX ORDER — ERYTHROMYCIN 5 MG/G
OINTMENT OPHTHALMIC 3 TIMES DAILY
COMMUNITY

## 2022-07-11 RX ORDER — LANOLIN ALCOHOL/MO/W.PET/CERES
CREAM (GRAM) TOPICAL
COMMUNITY
Start: 2022-06-27 | End: 2022-09-28 | Stop reason: SDUPTHER

## 2022-07-11 NOTE — ASSESSMENT & PLAN NOTE
Creatinine is back up from 1.3 to 1.6 as of his 7/22 office visit.  He was started back on Lasix, low-dose 10 mg daily by nephrology.  He does not look dry, he does have associated hyperkalemia, so recommend he continue current dosing.

## 2022-07-11 NOTE — ASSESSMENT & PLAN NOTE
A1c is down to 6.3 as of his 7/22 office visit.  His renal function is down some as well unfortunately, so I recommend that he lower his metformin to 500 mg twice daily.

## 2022-07-11 NOTE — ASSESSMENT & PLAN NOTE
Patient's potassium initially dipped down to 4.9, but is back up to 5.6 as of his 7/22 office visit.  The dip corresponded to an improvement in his creatinine down to 1.3, and it is back up to 1.6 now.  He will back off a little bit more on the potassium, and we will see what his levels are running in a couple of months at the time of his appointment with nephrology.

## 2022-07-11 NOTE — ASSESSMENT & PLAN NOTE
Blood pressure remains stable as of his 7/22 office visit.  He is controlled with full dose candesartan, moderate to high-dose doxazosin, and moderate dose carvedilol.  Additionally he is requiring high dose nifedipine.  Renal function stable enough to continue with the full dose ARB for now, we may have to back off on this in the future.

## 2022-07-11 NOTE — ASSESSMENT & PLAN NOTE
LDL is 74 as of 7/22 OV and that is at goal.  Patient stable to continue with low-dose pravastatin.

## 2022-07-11 NOTE — ASSESSMENT & PLAN NOTE
Hemoglobin is down from 12.7 to 11.5 as of his 7/22 office visit.  Additionally his ferritin is over 500 now, so he needs to discontinue the iron supplementation.  This is likely related to the drop in his renal function, will get erythropoietin level as well as repeat iron and B12 on return to office.

## 2022-07-18 NOTE — TELEPHONE ENCOUNTER
Patient's wife came by the office and asked for refills on meds. States that she always gets refills for a year. I have pended the medication. [facility]

## 2022-07-19 RX ORDER — NIFEDIPINE 90 MG/1
90 TABLET, EXTENDED RELEASE ORAL DAILY
Qty: 90 TABLET | Refills: 1 | Status: SHIPPED | OUTPATIENT
Start: 2022-07-19 | End: 2023-01-24 | Stop reason: SDUPTHER

## 2022-08-25 RX ORDER — FINASTERIDE 5 MG/1
5 TABLET, FILM COATED ORAL DAILY
Qty: 90 TABLET | Refills: 1 | Status: SHIPPED | OUTPATIENT
Start: 2022-08-25 | End: 2023-02-22 | Stop reason: SDUPTHER

## 2022-08-25 NOTE — TELEPHONE ENCOUNTER
Pt's wife is adamant that you give them a 90 day supply with 3 refills. Just wanted to check prior to sending.

## 2022-08-29 DIAGNOSIS — E78.00 HIGH CHOLESTEROL: Primary | ICD-10-CM

## 2022-08-29 RX ORDER — PRAVASTATIN SODIUM 20 MG
20 TABLET ORAL NIGHTLY
Qty: 90 TABLET | Refills: 1 | Status: SHIPPED | OUTPATIENT
Start: 2022-08-29 | End: 2023-02-27 | Stop reason: SDUPTHER

## 2022-09-06 DIAGNOSIS — J30.1 SEASONAL ALLERGIC RHINITIS DUE TO POLLEN: Primary | ICD-10-CM

## 2022-09-07 RX ORDER — LORATADINE 10 MG/1
10 TABLET ORAL DAILY
Qty: 90 TABLET | Refills: 1 | Status: SHIPPED | OUTPATIENT
Start: 2022-09-07 | End: 2023-01-24 | Stop reason: SDUPTHER

## 2022-09-19 ENCOUNTER — LAB (OUTPATIENT)
Dept: LAB | Facility: HOSPITAL | Age: 77
End: 2022-09-19

## 2022-09-19 ENCOUNTER — TRANSCRIBE ORDERS (OUTPATIENT)
Dept: LAB | Facility: HOSPITAL | Age: 77
End: 2022-09-19

## 2022-09-19 DIAGNOSIS — N18.32 CHRONIC KIDNEY DISEASE (CKD) STAGE G3B/A1, MODERATELY DECREASED GLOMERULAR FILTRATION RATE (GFR) BETWEEN 30-44 ML/MIN/1.73 SQUARE METER AND ALBUMINURIA CREATININE RATIO LESS THAN 30 MG/G (CMS/H*: ICD-10-CM

## 2022-09-19 DIAGNOSIS — N18.32 CHRONIC KIDNEY DISEASE (CKD) STAGE G3B/A1, MODERATELY DECREASED GLOMERULAR FILTRATION RATE (GFR) BETWEEN 30-44 ML/MIN/1.73 SQUARE METER AND ALBUMINURIA CREATININE RATIO LESS THAN 30 MG/G (CMS/H*: Primary | ICD-10-CM

## 2022-09-19 LAB
CREAT UR-MCNC: 23.6 MG/DL
PROT ?TM UR-MCNC: 46.3 MG/DL
PROT/CREAT UR: 1.96 MG/G{CREAT}

## 2022-09-19 PROCEDURE — 82570 ASSAY OF URINE CREATININE: CPT

## 2022-09-19 PROCEDURE — 81001 URINALYSIS AUTO W/SCOPE: CPT

## 2022-09-19 PROCEDURE — 80069 RENAL FUNCTION PANEL: CPT

## 2022-09-19 PROCEDURE — 84156 ASSAY OF PROTEIN URINE: CPT

## 2022-09-19 PROCEDURE — 82306 VITAMIN D 25 HYDROXY: CPT

## 2022-09-19 PROCEDURE — 36415 COLL VENOUS BLD VENIPUNCTURE: CPT

## 2022-09-19 PROCEDURE — 83970 ASSAY OF PARATHORMONE: CPT

## 2022-09-19 PROCEDURE — 85025 COMPLETE CBC W/AUTO DIFF WBC: CPT

## 2022-09-20 LAB
25(OH)D3 SERPL-MCNC: 35.8 NG/ML (ref 30–100)
ALBUMIN SERPL-MCNC: 4.2 G/DL (ref 3.5–5.2)
ANION GAP SERPL CALCULATED.3IONS-SCNC: 11 MMOL/L (ref 5–15)
BACTERIA UR QL AUTO: NORMAL /HPF
BASOPHILS # BLD AUTO: 0.06 10*3/MM3 (ref 0–0.2)
BASOPHILS NFR BLD AUTO: 0.7 % (ref 0–1.5)
BILIRUB UR QL STRIP: NEGATIVE
BUN SERPL-MCNC: 38 MG/DL (ref 8–23)
BUN/CREAT SERPL: 20.9 (ref 7–25)
CALCIUM SPEC-SCNC: 9 MG/DL (ref 8.6–10.5)
CHLORIDE SERPL-SCNC: 107 MMOL/L (ref 98–107)
CLARITY UR: CLEAR
CO2 SERPL-SCNC: 20 MMOL/L (ref 22–29)
COLOR UR: YELLOW
CREAT SERPL-MCNC: 1.82 MG/DL (ref 0.76–1.27)
DEPRECATED RDW RBC AUTO: 42.9 FL (ref 37–54)
EGFRCR SERPLBLD CKD-EPI 2021: 38.3 ML/MIN/1.73
EOSINOPHIL # BLD AUTO: 0.3 10*3/MM3 (ref 0–0.4)
EOSINOPHIL NFR BLD AUTO: 3.7 % (ref 0.3–6.2)
ERYTHROCYTE [DISTWIDTH] IN BLOOD BY AUTOMATED COUNT: 12.4 % (ref 12.3–15.4)
GLUCOSE SERPL-MCNC: 99 MG/DL (ref 65–99)
GLUCOSE UR STRIP-MCNC: NEGATIVE MG/DL
HCT VFR BLD AUTO: 34.4 % (ref 37.5–51)
HGB BLD-MCNC: 11.3 G/DL (ref 13–17.7)
HGB UR QL STRIP.AUTO: NEGATIVE
HYALINE CASTS UR QL AUTO: NORMAL /LPF
IMM GRANULOCYTES # BLD AUTO: 0.03 10*3/MM3 (ref 0–0.05)
IMM GRANULOCYTES NFR BLD AUTO: 0.4 % (ref 0–0.5)
KETONES UR QL STRIP: NEGATIVE
LEUKOCYTE ESTERASE UR QL STRIP.AUTO: NEGATIVE
LYMPHOCYTES # BLD AUTO: 2.04 10*3/MM3 (ref 0.7–3.1)
LYMPHOCYTES NFR BLD AUTO: 25.1 % (ref 19.6–45.3)
MCH RBC QN AUTO: 31.2 PG (ref 26.6–33)
MCHC RBC AUTO-ENTMCNC: 32.8 G/DL (ref 31.5–35.7)
MCV RBC AUTO: 95 FL (ref 79–97)
MONOCYTES # BLD AUTO: 0.5 10*3/MM3 (ref 0.1–0.9)
MONOCYTES NFR BLD AUTO: 6.2 % (ref 5–12)
NEUTROPHILS NFR BLD AUTO: 5.2 10*3/MM3 (ref 1.7–7)
NEUTROPHILS NFR BLD AUTO: 63.9 % (ref 42.7–76)
NITRITE UR QL STRIP: NEGATIVE
NRBC BLD AUTO-RTO: 0 /100 WBC (ref 0–0.2)
PH UR STRIP.AUTO: 6 [PH] (ref 5–8)
PHOSPHATE SERPL-MCNC: 3.2 MG/DL (ref 2.5–4.5)
PLATELET # BLD AUTO: 195 10*3/MM3 (ref 140–450)
PMV BLD AUTO: 10.8 FL (ref 6–12)
POTASSIUM SERPL-SCNC: 5.3 MMOL/L (ref 3.5–5.2)
PROT UR QL STRIP: ABNORMAL
PTH-INTACT SERPL-MCNC: 22.5 PG/ML (ref 15–65)
RBC # BLD AUTO: 3.62 10*6/MM3 (ref 4.14–5.8)
RBC # UR STRIP: NORMAL /HPF
REF LAB TEST METHOD: NORMAL
SODIUM SERPL-SCNC: 138 MMOL/L (ref 136–145)
SP GR UR STRIP: 1.01 (ref 1–1.03)
SQUAMOUS #/AREA URNS HPF: NORMAL /HPF
UROBILINOGEN UR QL STRIP: ABNORMAL
WBC # UR STRIP: NORMAL /HPF
WBC NRBC COR # BLD: 8.13 10*3/MM3 (ref 3.4–10.8)

## 2022-09-28 DIAGNOSIS — E11.9 TYPE 2 DIABETES MELLITUS WITHOUT COMPLICATION, WITHOUT LONG-TERM CURRENT USE OF INSULIN: ICD-10-CM

## 2022-09-28 RX ORDER — CANDESARTAN 32 MG/1
32 TABLET ORAL NIGHTLY
Qty: 90 TABLET | Refills: 1 | Status: SHIPPED | OUTPATIENT
Start: 2022-09-28 | End: 2023-01-16

## 2022-09-28 RX ORDER — LANOLIN ALCOHOL/MO/W.PET/CERES
1000 CREAM (GRAM) TOPICAL DAILY
Qty: 90 TABLET | Refills: 1 | Status: SHIPPED | OUTPATIENT
Start: 2022-09-28

## 2022-10-07 ENCOUNTER — CLINICAL SUPPORT (OUTPATIENT)
Dept: INTERNAL MEDICINE | Facility: CLINIC | Age: 77
End: 2022-10-07

## 2022-10-07 PROCEDURE — G0008 ADMIN INFLUENZA VIRUS VAC: HCPCS | Performed by: INTERNAL MEDICINE

## 2022-10-07 PROCEDURE — 90662 IIV NO PRSV INCREASED AG IM: CPT | Performed by: INTERNAL MEDICINE

## 2022-10-17 RX ORDER — TOLTERODINE 2 MG/1
2 CAPSULE, EXTENDED RELEASE ORAL DAILY
Qty: 90 CAPSULE | Refills: 3 | Status: SHIPPED | OUTPATIENT
Start: 2022-10-17

## 2022-10-26 ENCOUNTER — LAB (OUTPATIENT)
Dept: LAB | Facility: HOSPITAL | Age: 77
End: 2022-10-26

## 2022-10-26 ENCOUNTER — TRANSCRIBE ORDERS (OUTPATIENT)
Dept: LAB | Facility: HOSPITAL | Age: 77
End: 2022-10-26

## 2022-10-26 DIAGNOSIS — D50.8 IRON DEFICIENCY ANEMIA SECONDARY TO INADEQUATE DIETARY IRON INTAKE: ICD-10-CM

## 2022-10-26 DIAGNOSIS — E55.9 VITAMIN D DEFICIENCY: ICD-10-CM

## 2022-10-26 DIAGNOSIS — N18.31 STAGE 3A CHRONIC KIDNEY DISEASE: ICD-10-CM

## 2022-10-26 DIAGNOSIS — Z00.00 WELL ADULT EXAM: ICD-10-CM

## 2022-10-26 DIAGNOSIS — E11.9 TYPE 2 DIABETES MELLITUS WITHOUT COMPLICATION, WITHOUT LONG-TERM CURRENT USE OF INSULIN: ICD-10-CM

## 2022-10-26 DIAGNOSIS — N18.32 CHRONIC KIDNEY DISEASE (CKD) STAGE G3B/A1, MODERATELY DECREASED GLOMERULAR FILTRATION RATE (GFR) BETWEEN 30-44 ML/MIN/1.73 SQUARE METER AND ALBUMINURIA CREATININE RATIO LESS THAN 30 MG/G (CMS/H*: ICD-10-CM

## 2022-10-26 DIAGNOSIS — N18.32 CHRONIC KIDNEY DISEASE (CKD) STAGE G3B/A1, MODERATELY DECREASED GLOMERULAR FILTRATION RATE (GFR) BETWEEN 30-44 ML/MIN/1.73 SQUARE METER AND ALBUMINURIA CREATININE RATIO LESS THAN 30 MG/G (CMS/H*: Primary | ICD-10-CM

## 2022-10-26 LAB — HBA1C MFR BLD: 6.8 % (ref 4.8–5.6)

## 2022-10-26 PROCEDURE — 82728 ASSAY OF FERRITIN: CPT

## 2022-10-26 PROCEDURE — 82746 ASSAY OF FOLIC ACID SERUM: CPT

## 2022-10-26 PROCEDURE — 82607 VITAMIN B-12: CPT

## 2022-10-26 PROCEDURE — 85025 COMPLETE CBC W/AUTO DIFF WBC: CPT

## 2022-10-26 PROCEDURE — 83540 ASSAY OF IRON: CPT

## 2022-10-26 PROCEDURE — 86803 HEPATITIS C AB TEST: CPT

## 2022-10-26 PROCEDURE — 83036 HEMOGLOBIN GLYCOSYLATED A1C: CPT

## 2022-10-26 PROCEDURE — 36415 COLL VENOUS BLD VENIPUNCTURE: CPT

## 2022-10-26 PROCEDURE — 84466 ASSAY OF TRANSFERRIN: CPT

## 2022-10-26 PROCEDURE — 82306 VITAMIN D 25 HYDROXY: CPT

## 2022-10-26 PROCEDURE — 80048 BASIC METABOLIC PNL TOTAL CA: CPT

## 2022-10-26 PROCEDURE — 82668 ASSAY OF ERYTHROPOIETIN: CPT

## 2022-10-27 LAB
25(OH)D3 SERPL-MCNC: 35.9 NG/ML (ref 30–100)
ANION GAP SERPL CALCULATED.3IONS-SCNC: 11.7 MMOL/L (ref 5–15)
BASOPHILS # BLD AUTO: 0.01 10*3/MM3 (ref 0–0.2)
BASOPHILS NFR BLD AUTO: 0.1 % (ref 0–1.5)
BUN SERPL-MCNC: 22 MG/DL (ref 8–23)
BUN/CREAT SERPL: 16.8 (ref 7–25)
CALCIUM SPEC-SCNC: 9.1 MG/DL (ref 8.6–10.5)
CHLORIDE SERPL-SCNC: 103 MMOL/L (ref 98–107)
CO2 SERPL-SCNC: 23.3 MMOL/L (ref 22–29)
CREAT SERPL-MCNC: 1.31 MG/DL (ref 0.76–1.27)
DEPRECATED RDW RBC AUTO: 39 FL (ref 37–54)
EGFRCR SERPLBLD CKD-EPI 2021: 56.8 ML/MIN/1.73
EOSINOPHIL # BLD AUTO: 0.22 10*3/MM3 (ref 0–0.4)
EOSINOPHIL NFR BLD AUTO: 2.7 % (ref 0.3–6.2)
ERYTHROCYTE [DISTWIDTH] IN BLOOD BY AUTOMATED COUNT: 11.8 % (ref 12.3–15.4)
FERRITIN SERPL-MCNC: 438 NG/ML (ref 30–400)
FOLATE SERPL-MCNC: >20 NG/ML (ref 4.78–24.2)
GLUCOSE SERPL-MCNC: 105 MG/DL (ref 65–99)
HCT VFR BLD AUTO: 34.6 % (ref 37.5–51)
HCV AB SER DONR QL: NORMAL
HGB BLD-MCNC: 11.7 G/DL (ref 13–17.7)
IMM GRANULOCYTES # BLD AUTO: 0.02 10*3/MM3 (ref 0–0.05)
IMM GRANULOCYTES NFR BLD AUTO: 0.2 % (ref 0–0.5)
IRON 24H UR-MRATE: 57 MCG/DL (ref 59–158)
IRON SATN MFR SERPL: 19 % (ref 20–50)
LYMPHOCYTES # BLD AUTO: 2.11 10*3/MM3 (ref 0.7–3.1)
LYMPHOCYTES NFR BLD AUTO: 25.7 % (ref 19.6–45.3)
MCH RBC QN AUTO: 30.4 PG (ref 26.6–33)
MCHC RBC AUTO-ENTMCNC: 33.8 G/DL (ref 31.5–35.7)
MCV RBC AUTO: 89.9 FL (ref 79–97)
MONOCYTES # BLD AUTO: 0.47 10*3/MM3 (ref 0.1–0.9)
MONOCYTES NFR BLD AUTO: 5.7 % (ref 5–12)
NEUTROPHILS NFR BLD AUTO: 5.39 10*3/MM3 (ref 1.7–7)
NEUTROPHILS NFR BLD AUTO: 65.6 % (ref 42.7–76)
NRBC BLD AUTO-RTO: 0 /100 WBC (ref 0–0.2)
PLATELET # BLD AUTO: 192 10*3/MM3 (ref 140–450)
PMV BLD AUTO: 11.2 FL (ref 6–12)
POTASSIUM SERPL-SCNC: 4.4 MMOL/L (ref 3.5–5.2)
RBC # BLD AUTO: 3.85 10*6/MM3 (ref 4.14–5.8)
SODIUM SERPL-SCNC: 138 MMOL/L (ref 136–145)
TIBC SERPL-MCNC: 301 MCG/DL (ref 298–536)
TRANSFERRIN SERPL-MCNC: 202 MG/DL (ref 200–360)
VIT B12 BLD-MCNC: 1622 PG/ML (ref 211–946)
WBC NRBC COR # BLD: 8.22 10*3/MM3 (ref 3.4–10.8)

## 2022-10-28 LAB — EPO SERPL-ACNC: 8.6 MIU/ML (ref 2.6–18.5)

## 2022-12-28 ENCOUNTER — TRANSCRIBE ORDERS (OUTPATIENT)
Dept: PULMONOLOGY | Facility: CLINIC | Age: 77
End: 2022-12-28

## 2022-12-28 ENCOUNTER — LAB (OUTPATIENT)
Dept: LAB | Facility: HOSPITAL | Age: 77
End: 2022-12-28
Payer: MEDICARE

## 2022-12-28 DIAGNOSIS — Z86.79 HISTORY OF HIGH BLOOD PRESSURE: ICD-10-CM

## 2022-12-28 DIAGNOSIS — N18.32 CHRONIC KIDNEY DISEASE (CKD) STAGE G3B/A1, MODERATELY DECREASED GLOMERULAR FILTRATION RATE (GFR) BETWEEN 30-44 ML/MIN/1.73 SQUARE METER AND ALBUMINURIA CREATININE RATIO LESS THAN 30 MG/G (CMS/H*: Primary | ICD-10-CM

## 2022-12-28 DIAGNOSIS — N18.32 CHRONIC KIDNEY DISEASE (CKD) STAGE G3B/A1, MODERATELY DECREASED GLOMERULAR FILTRATION RATE (GFR) BETWEEN 30-44 ML/MIN/1.73 SQUARE METER AND ALBUMINURIA CREATININE RATIO LESS THAN 30 MG/G (CMS/H*: ICD-10-CM

## 2022-12-28 LAB
ALBUMIN SERPL-MCNC: 4.6 G/DL (ref 3.5–5.2)
ANION GAP SERPL CALCULATED.3IONS-SCNC: 12.7 MMOL/L (ref 5–15)
BACTERIA UR QL AUTO: NORMAL /HPF
BASOPHILS # BLD AUTO: 0.05 10*3/MM3 (ref 0–0.2)
BASOPHILS NFR BLD AUTO: 0.6 % (ref 0–1.5)
BILIRUB UR QL STRIP: NEGATIVE
BUN SERPL-MCNC: 36 MG/DL (ref 8–23)
BUN/CREAT SERPL: 20.8 (ref 7–25)
CALCIUM SPEC-SCNC: 9.8 MG/DL (ref 8.6–10.5)
CHLORIDE SERPL-SCNC: 102 MMOL/L (ref 98–107)
CLARITY UR: CLEAR
CO2 SERPL-SCNC: 23.3 MMOL/L (ref 22–29)
COLOR UR: YELLOW
CREAT SERPL-MCNC: 1.73 MG/DL (ref 0.76–1.27)
CREAT UR-MCNC: 38.7 MG/DL
DEPRECATED RDW RBC AUTO: 41.4 FL (ref 37–54)
EGFRCR SERPLBLD CKD-EPI 2021: 40.7 ML/MIN/1.73
EOSINOPHIL # BLD AUTO: 0.52 10*3/MM3 (ref 0–0.4)
EOSINOPHIL NFR BLD AUTO: 5.8 % (ref 0.3–6.2)
ERYTHROCYTE [DISTWIDTH] IN BLOOD BY AUTOMATED COUNT: 12.4 % (ref 12.3–15.4)
GLUCOSE SERPL-MCNC: 103 MG/DL (ref 65–99)
GLUCOSE UR STRIP-MCNC: NEGATIVE MG/DL
HCT VFR BLD AUTO: 36.3 % (ref 37.5–51)
HGB BLD-MCNC: 12.2 G/DL (ref 13–17.7)
HGB UR QL STRIP.AUTO: NEGATIVE
HYALINE CASTS UR QL AUTO: NORMAL /LPF
IMM GRANULOCYTES # BLD AUTO: 0.03 10*3/MM3 (ref 0–0.05)
IMM GRANULOCYTES NFR BLD AUTO: 0.3 % (ref 0–0.5)
KETONES UR QL STRIP: NEGATIVE
LEUKOCYTE ESTERASE UR QL STRIP.AUTO: NEGATIVE
LYMPHOCYTES # BLD AUTO: 2.66 10*3/MM3 (ref 0.7–3.1)
LYMPHOCYTES NFR BLD AUTO: 29.8 % (ref 19.6–45.3)
MCH RBC QN AUTO: 30.9 PG (ref 26.6–33)
MCHC RBC AUTO-ENTMCNC: 33.6 G/DL (ref 31.5–35.7)
MCV RBC AUTO: 91.9 FL (ref 79–97)
MONOCYTES # BLD AUTO: 0.56 10*3/MM3 (ref 0.1–0.9)
MONOCYTES NFR BLD AUTO: 6.3 % (ref 5–12)
NEUTROPHILS NFR BLD AUTO: 5.11 10*3/MM3 (ref 1.7–7)
NEUTROPHILS NFR BLD AUTO: 57.2 % (ref 42.7–76)
NITRITE UR QL STRIP: NEGATIVE
NRBC BLD AUTO-RTO: 0 /100 WBC (ref 0–0.2)
PH UR STRIP.AUTO: 6 [PH] (ref 5–8)
PHOSPHATE SERPL-MCNC: 4.3 MG/DL (ref 2.5–4.5)
PLATELET # BLD AUTO: 176 10*3/MM3 (ref 140–450)
PMV BLD AUTO: 11.5 FL (ref 6–12)
POTASSIUM SERPL-SCNC: 4.4 MMOL/L (ref 3.5–5.2)
PROT ?TM UR-MCNC: 113.7 MG/DL
PROT UR QL STRIP: ABNORMAL
PROT/CREAT UR: 2.94 MG/G{CREAT}
RBC # BLD AUTO: 3.95 10*6/MM3 (ref 4.14–5.8)
RBC # UR STRIP: NORMAL /HPF
REF LAB TEST METHOD: NORMAL
SODIUM SERPL-SCNC: 138 MMOL/L (ref 136–145)
SP GR UR STRIP: 1.01 (ref 1–1.03)
SQUAMOUS #/AREA URNS HPF: NORMAL /HPF
UROBILINOGEN UR QL STRIP: ABNORMAL
WBC # UR STRIP: NORMAL /HPF
WBC NRBC COR # BLD: 8.93 10*3/MM3 (ref 3.4–10.8)

## 2022-12-28 PROCEDURE — 82570 ASSAY OF URINE CREATININE: CPT

## 2022-12-28 PROCEDURE — 85025 COMPLETE CBC W/AUTO DIFF WBC: CPT

## 2022-12-28 PROCEDURE — 84156 ASSAY OF PROTEIN URINE: CPT

## 2022-12-28 PROCEDURE — 81001 URINALYSIS AUTO W/SCOPE: CPT

## 2022-12-28 PROCEDURE — 80069 RENAL FUNCTION PANEL: CPT

## 2022-12-28 PROCEDURE — 36415 COLL VENOUS BLD VENIPUNCTURE: CPT

## 2022-12-28 RX ORDER — CARVEDILOL 12.5 MG/1
12.5 TABLET ORAL 2 TIMES DAILY
Qty: 180 TABLET | Refills: 1 | Status: SHIPPED | OUTPATIENT
Start: 2022-12-28

## 2023-01-16 ENCOUNTER — OFFICE VISIT (OUTPATIENT)
Dept: INTERNAL MEDICINE | Facility: CLINIC | Age: 78
End: 2023-01-16
Payer: MEDICARE

## 2023-01-16 VITALS
SYSTOLIC BLOOD PRESSURE: 136 MMHG | OXYGEN SATURATION: 96 % | HEART RATE: 70 BPM | WEIGHT: 217.2 LBS | BODY MASS INDEX: 31.09 KG/M2 | DIASTOLIC BLOOD PRESSURE: 78 MMHG | TEMPERATURE: 97.8 F | HEIGHT: 70 IN

## 2023-01-16 DIAGNOSIS — I10 BENIGN ESSENTIAL HTN: Primary | ICD-10-CM

## 2023-01-16 DIAGNOSIS — E78.2 MIXED HYPERLIPIDEMIA: ICD-10-CM

## 2023-01-16 DIAGNOSIS — E11.9 TYPE 2 DIABETES MELLITUS WITHOUT COMPLICATION, WITHOUT LONG-TERM CURRENT USE OF INSULIN: ICD-10-CM

## 2023-01-16 DIAGNOSIS — E55.9 VITAMIN D DEFICIENCY: ICD-10-CM

## 2023-01-16 DIAGNOSIS — N18.32 STAGE 3B CHRONIC KIDNEY DISEASE: ICD-10-CM

## 2023-01-16 DIAGNOSIS — E87.5 HYPERKALEMIA: ICD-10-CM

## 2023-01-16 PROBLEM — N18.31 STAGE 3A CHRONIC KIDNEY DISEASE: Status: RESOLVED | Noted: 2021-09-12 | Resolved: 2023-01-16

## 2023-01-16 PROCEDURE — 99214 OFFICE O/P EST MOD 30 MIN: CPT | Performed by: INTERNAL MEDICINE

## 2023-01-16 RX ORDER — SODIUM BICARBONATE 650 MG/1
650 TABLET ORAL 3 TIMES DAILY
COMMUNITY

## 2023-01-16 RX ORDER — FUROSEMIDE 20 MG/1
10 TABLET ORAL DAILY
COMMUNITY

## 2023-01-16 NOTE — ASSESSMENT & PLAN NOTE
Vitamin D is 36 as of his 1/23 OV.  Patient stable to continue with low-dose over-the-counter supplementation.

## 2023-01-16 NOTE — ASSESSMENT & PLAN NOTE
Blood pressure remains well controlled as of his 1/23 office visit.  He stable to continue with full dose ARB along with full dose doxazosin and nifedipine.  Additionally he has moderate dose carvedilol as well as low-dose diuretic on board.

## 2023-01-16 NOTE — ASSESSMENT & PLAN NOTE
GFR is stable around 41 as of his 1/23 OV.  He is following closely with nephrology as well.  Sodium bicarb was ordered recently to see if this would help with his loose stools.  No significant acidosis noted on recent labs.

## 2023-01-16 NOTE — ASSESSMENT & PLAN NOTE
Potassium was better as of his 1/23 OV, it was 4.4.  He is being maintained on a 2000 mg daily potassium diet, that certainly helping.

## 2023-01-16 NOTE — ASSESSMENT & PLAN NOTE
A1c was 6.8 in 11/22.  His GFR is steady, I think were stable to continue with just 500 twice daily of metformin.  Follow-up labs in the spring.

## 2023-01-24 DIAGNOSIS — J30.1 SEASONAL ALLERGIC RHINITIS DUE TO POLLEN: ICD-10-CM

## 2023-01-24 RX ORDER — NIFEDIPINE 90 MG/1
90 TABLET, EXTENDED RELEASE ORAL DAILY
Qty: 90 TABLET | Refills: 1 | Status: SHIPPED | OUTPATIENT
Start: 2023-01-24

## 2023-01-24 RX ORDER — LORATADINE 10 MG/1
10 TABLET ORAL DAILY
Qty: 90 TABLET | Refills: 1 | Status: SHIPPED | OUTPATIENT
Start: 2023-01-24 | End: 2023-04-24

## 2023-01-24 NOTE — TELEPHONE ENCOUNTER
Patient is needing a refill on Loratadine 10 MG and Nifedipine 90 MG     [facility]

## 2023-02-22 RX ORDER — FINASTERIDE 5 MG/1
5 TABLET, FILM COATED ORAL DAILY
Qty: 90 TABLET | Refills: 1 | Status: SHIPPED | OUTPATIENT
Start: 2023-02-22

## 2023-02-27 DIAGNOSIS — E78.00 HIGH CHOLESTEROL: ICD-10-CM

## 2023-02-27 RX ORDER — FLUTICASONE PROPIONATE 50 MCG
2 SPRAY, SUSPENSION (ML) NASAL DAILY
Qty: 48 G | Refills: 3 | Status: SHIPPED | OUTPATIENT
Start: 2023-02-27

## 2023-02-27 RX ORDER — PRAVASTATIN SODIUM 20 MG
20 TABLET ORAL NIGHTLY
Qty: 90 TABLET | Refills: 1 | Status: SHIPPED | OUTPATIENT
Start: 2023-02-27

## 2023-03-07 ENCOUNTER — OFFICE VISIT (OUTPATIENT)
Dept: UROLOGY | Facility: CLINIC | Age: 78
End: 2023-03-07
Payer: MEDICARE

## 2023-03-07 VITALS — BODY MASS INDEX: 31.07 KG/M2 | HEIGHT: 70 IN | RESPIRATION RATE: 18 BRPM | WEIGHT: 217 LBS

## 2023-03-07 DIAGNOSIS — N39.45 CONTINUOUS LEAKAGE OF URINE: Primary | ICD-10-CM

## 2023-03-07 DIAGNOSIS — N20.0 RENAL STONES: ICD-10-CM

## 2023-03-07 LAB
BILIRUB BLD-MCNC: NEGATIVE MG/DL
CLARITY, POC: CLEAR
COLOR UR: YELLOW
EXPIRATION DATE: ABNORMAL
GLUCOSE UR STRIP-MCNC: NEGATIVE MG/DL
KETONES UR QL: NEGATIVE
LEUKOCYTE EST, POC: NEGATIVE
Lab: ABNORMAL
NITRITE UR-MCNC: NEGATIVE MG/ML
PH UR: 6 [PH] (ref 5–8)
PROT UR STRIP-MCNC: ABNORMAL MG/DL
RBC # UR STRIP: NEGATIVE /UL
SP GR UR: 1.01 (ref 1–1.03)
URINE VOLUME: 0
UROBILINOGEN UR QL: ABNORMAL

## 2023-03-07 PROCEDURE — 51798 US URINE CAPACITY MEASURE: CPT | Performed by: NURSE PRACTITIONER

## 2023-03-07 PROCEDURE — 99203 OFFICE O/P NEW LOW 30 MIN: CPT | Performed by: NURSE PRACTITIONER

## 2023-03-07 PROCEDURE — 81003 URINALYSIS AUTO W/O SCOPE: CPT | Performed by: NURSE PRACTITIONER

## 2023-03-07 RX ORDER — NIFEDIPINE 90 MG/1
1 TABLET, FILM COATED, EXTENDED RELEASE ORAL DAILY
COMMUNITY

## 2023-03-07 NOTE — PROGRESS NOTES
Chief Complaint: Urinary Incontinence    Subjective         History of Present Illness  Telly Fitch is a 77 y.o. male presents to [facility] to be seen for urinary incontinence.    The patient sees [facility] for stage IIIb chronic kidney disease.  At his last visit in January he had complained of incontinence.  He is having to wear pads at all times.  Previously seen by Dr. Benavidez in [place].    He states issues with urinary leakage sine 2005. He states that in 2009 this worsened    He is currently on finasteride Cardura and tolterodine for several years.    He states that he has no sensation to void.    Nocturia x 3     Longstanding hx of renal stones.    He has seen cardiology before and is on nitro as needed for chest pain.    No family hx of  malignancies.      Objective     Past Medical History:   Diagnosis Date   • Acute peptic ulcer, site unspecified, without hemorrhage or perforation    • BPH (benign prostatic hyperplasia)    • CAD (coronary artery disease)    • Chronic kidney disease, stage 3 (HCC)    • DJD (degenerative joint disease)    • Enlarged prostate without lower urinary tract symptoms (luts)    • Essential (primary) hypertension    • Hypercholesteremia    • Intervertebral disc disorders with radiculopathy, lumbar region    • Iron deficiency anemia, unspecified    • Mixed hyperlipidemia    • Morbid (severe) obesity due to excess calories (HCC)    • Nephrolithiasis    • Nicotine dependence, unspecified, uncomplicated    • RUPESH (obstructive sleep apnea)    • RAD (reactive airway disease)    • Sleep apnea, unspecified    • Type 2 diabetes mellitus with other diabetic kidney complication (HCC)     WITH CHRONIC KIDNEY DISEASE        Past Surgical History:   Procedure Laterality Date   • BASAL CELL CARCINOMA EXCISION  6/2020; 11/12/2020; 12/2019    REMOVED FROM NOSE   • CATARACT EXTRACTION, BILATERAL     • COLONOSCOPY     • DENTAL PROCEDURE  07/2019   •  KIDNEY STONE SURGERY Bilateral     MARCH AND APRIL/ REMOVAL (JULY/AUG)   • OTHER SURGICAL HISTORY      BLEEDING ULCER   • SKIN CANCER DESTRUCTION Right 07/2019    CANCER REMOVED FROM RIGHT TEMPLE   • UMBILICAL HERNIA REPAIR      R/L         Current Outpatient Medications:   •  albuterol sulfate  (90 Base) MCG/ACT inhaler, As Needed., Disp: , Rfl:   •  ascorbic acid (VITAMIN C) 1000 MG tablet, 1 tablet 2 (Two) Times a Day., Disp: , Rfl:   •  aspirin 81 MG chewable tablet, aspirin 81 mg oral tablet,chewable chew 1 tablet (81 mg) by oral route once daily   Suspended, Disp: , Rfl:   •  carvedilol (COREG) 12.5 MG tablet, Take 1 tablet by mouth 2 (Two) Times a Day., Disp: 180 tablet, Rfl: 1  •  dicyclomine (BENTYL) 20 MG tablet, As Needed., Disp: , Rfl:   •  erythromycin (ROMYCIN) 5 MG/GM ophthalmic ointment, Administer  to the right eye 3 (Three) Times a Day., Disp: , Rfl:   •  finasteride (PROSCAR) 5 MG tablet, Take 1 tablet by mouth Daily., Disp: 90 tablet, Rfl: 1  •  fluticasone (Flonase) 50 MCG/ACT nasal spray, 2 sprays by Each Nare route Daily., Disp: 48 g, Rfl: 3  •  fluticasone-salmeterol (ADVAIR) 250-50 MCG/DOSE DISKUS, Inhale 1 puff 2 (Two) Times a Day., Disp: , Rfl:   •  furosemide (LASIX) 20 MG tablet, Take 10 mg by mouth Daily., Disp: , Rfl:   •  loratadine (CLARITIN) 10 MG tablet, Take 1 tablet by mouth Daily for 90 days., Disp: 90 tablet, Rfl: 1  •  metFORMIN (GLUCOPHAGE) 500 MG tablet, Take 1 tablet by mouth 2 (Two) Times a Day., Disp: 180 tablet, Rfl: 1  •  montelukast (SINGULAIR) 10 MG tablet, Take 1 tablet by mouth Every Night., Disp: 90 tablet, Rfl: 3  •  multivitamin (THERAGRAN) tablet tablet, Take  by mouth Daily., Disp: , Rfl:   •  NIFEdipine CC (ADALAT CC) 90 MG 24 hr tablet, Take 1 tablet by mouth Daily., Disp: , Rfl:   •  NIFEdipine XL (PROCARDIA XL) 90 MG 24 hr tablet, Take 1 tablet by mouth Daily., Disp: 90 tablet, Rfl: 1  •  nitroglycerin (Nitrostat) 0.4 MG SL tablet, Place 1 tablet  "under the tongue Every 5 (Five) Minutes As Needed for Chest Pain. Take no more than 3 doses in 15 minutes., Disp: 1 tablet, Rfl: 1  •  omeprazole (PrilOSEC) 20 MG capsule, Take 1 capsule by mouth Daily., Disp: 90 capsule, Rfl: 3  •  pravastatin (PRAVACHOL) 20 MG tablet, Take 1 tablet by mouth Every Night., Disp: 90 tablet, Rfl: 1  •  Red Yeast Rice 600 MG capsule, Take 1 capsule by mouth Daily., Disp: 90 each, Rfl: 1  •  sodium bicarbonate 650 MG tablet, Take 1 tablet by mouth 3 (Three) Times a Day., Disp: , Rfl:   •  tolterodine LA (DETROL LA) 2 MG 24 hr capsule, Take 1 capsule by mouth Daily., Disp: 90 capsule, Rfl: 3  •  vitamin B-12 (CYANOCOBALAMIN) 1000 MCG tablet, Take 1 tablet by mouth Daily., Disp: 90 tablet, Rfl: 1  •  vitamin E 400 UNIT capsule, Take 1 capsule by mouth Daily., Disp: , Rfl:   •  Zinc 50 MG tablet, Take  by mouth Daily., Disp: , Rfl:   •  candesartan (ATACAND) 32 MG tablet, Take 1 tablet by mouth Every Night for 90 days., Disp: 90 tablet, Rfl: 1  •  doxazosin (CARDURA) 2 MG tablet, Take 4 tablets by mouth Daily for 90 days., Disp: 360 tablet, Rfl: 3    Allergies   Allergen Reactions   • Contrast Dye (Echo Or Unknown Ct/Mr) Rash     Red rash, with itching   • Lactose Intolerance (Gi) Diarrhea   • Latex Rash     Skins peeling        History reviewed. No pertinent family history.    Social History     Socioeconomic History   • Marital status:    Tobacco Use   • Smoking status: Never   • Smokeless tobacco: Never   Vaping Use   • Vaping Use: Never used   Substance and Sexual Activity   • Alcohol use: Never   • Drug use: Never   • Sexual activity: Defer       Vital Signs:   Resp 18   Ht 177.8 cm (70\")   Wt 98.4 kg (217 lb)   BMI 31.14 kg/m²      Physical Exam     Result Review :   The following data was reviewed by: KEITH Pierce on 03/07/2023:  Results for orders placed or performed in visit on 03/07/23   Bladder Scan   Result Value Ref Range    Urine Volume 0    POC Urinalysis " Dipstick, Automated    Specimen: Urine   Result Value Ref Range    Color Yellow Yellow, Straw, Dark Yellow, Regina    Clarity, UA Clear Clear    Specific Gravity  1.015 1.005 - 1.030    pH, Urine 6.0 5.0 - 8.0    Leukocytes Negative Negative    Nitrite, UA Negative Negative    Protein,  mg/dL (A) Negative mg/dL    Glucose, UA Negative Negative mg/dL    Ketones, UA Negative Negative    Urobilinogen, UA 0.2 E.U./dL Normal, 0.2 E.U./dL    Bilirubin Negative Negative    Blood, UA Negative Negative    Lot Number [id number]     Expiration Date 4/2,024        Bladder Scan interpretation 03/07/2023    Estimation of residual urine via BrightergyI 3000 Yingying Licai Bladder Scan  MA/nurse performing: Kalyan Aguayo RN   Residual Urine: 0 ml  Indication: Continuous leakage of urine    Renal stones   Position: Supine  Examination: Incremental scanning of the suprapubic area using 2.0 MHz transducer using copious amounts of acoustic gel.   Findings: An anechoic area was demonstrated which represented the bladder, with measurement of residual urine as noted. I inspected this myself. In that the residual urine was stable or insignificant, refer to plan for treatment and plan necessary at this time.           Procedures        Assessment and Plan    Diagnoses and all orders for this visit:    1. Continuous leakage of urine (Primary)  -     POC Urinalysis Dipstick, Automated  -     Bladder Scan  -     Cystometrogram; Future    2. Renal stones  -     CT Abdomen Pelvis Stone Protocol; Future      Given patient's history of extensive renal stones I would like to get him set up for some repeat imaging to evaluate for presence of renal stones and get him set up for urodynamics test to delineate underlying etiology of his urinary incontinence as he is on triple therapy for BPH and overactive bladder at this point in time but appears to be emptying well.        I spent 15 minutes caring for Telly on this date of service. This time includes time spent by me  in the following activities:reviewing tests, obtaining and/or reviewing a separately obtained history, performing a medically appropriate examination and/or evaluation , counseling and educating the patient/family/caregiver, ordering medications, tests, or procedures, and documenting information in the medical record  Follow Up   Return for uds f/u 1 week after .  Patient was given instructions and counseling regarding his condition or for health maintenance advice. Please see specific information pulled into the AVS if appropriate.         This document has been electronically signed by KEITH Pierce  March 7, 2023 13:21 EST

## 2023-03-14 DIAGNOSIS — E11.9 TYPE 2 DIABETES MELLITUS WITHOUT COMPLICATION, WITHOUT LONG-TERM CURRENT USE OF INSULIN: ICD-10-CM

## 2023-03-15 ENCOUNTER — HOSPITAL ENCOUNTER (OUTPATIENT)
Dept: CT IMAGING | Facility: HOSPITAL | Age: 78
Discharge: HOME | End: 2023-03-15
Admitting: NURSE PRACTITIONER
Payer: MEDICARE

## 2023-03-15 DIAGNOSIS — N20.0 RENAL STONES: ICD-10-CM

## 2023-03-15 PROCEDURE — 74176 CT ABD & PELVIS W/O CONTRAST: CPT

## 2023-03-16 ENCOUNTER — TELEPHONE (OUTPATIENT)
Dept: UROLOGY | Facility: CLINIC | Age: 78
End: 2023-03-16
Payer: MEDICARE

## 2023-04-10 ENCOUNTER — TRANSCRIBE ORDERS (OUTPATIENT)
Dept: LAB | Facility: HOSPITAL | Age: 78
End: 2023-04-10
Payer: MEDICARE

## 2023-04-10 ENCOUNTER — LAB (OUTPATIENT)
Dept: LAB | Facility: HOSPITAL | Age: 78
End: 2023-04-10
Payer: MEDICARE

## 2023-04-10 DIAGNOSIS — N18.32 CHRONIC KIDNEY DISEASE (CKD) STAGE G3B/A1, MODERATELY DECREASED GLOMERULAR FILTRATION RATE (GFR) BETWEEN 30-44 ML/MIN/1.73 SQUARE METER AND ALBUMINURIA CREATININE RATIO LESS THAN 30 MG/G (CMS/H*: Primary | ICD-10-CM

## 2023-04-10 DIAGNOSIS — E55.9 AVITAMINOSIS D: ICD-10-CM

## 2023-04-10 DIAGNOSIS — E11.9 TYPE 2 DIABETES MELLITUS WITHOUT COMPLICATION, WITHOUT LONG-TERM CURRENT USE OF INSULIN: ICD-10-CM

## 2023-04-10 DIAGNOSIS — N18.32 STAGE 3B CHRONIC KIDNEY DISEASE: ICD-10-CM

## 2023-04-10 DIAGNOSIS — N18.32 CHRONIC KIDNEY DISEASE (CKD) STAGE G3B/A1, MODERATELY DECREASED GLOMERULAR FILTRATION RATE (GFR) BETWEEN 30-44 ML/MIN/1.73 SQUARE METER AND ALBUMINURIA CREATININE RATIO LESS THAN 30 MG/G (CMS/H*: ICD-10-CM

## 2023-04-10 DIAGNOSIS — E78.2 MIXED HYPERLIPIDEMIA: ICD-10-CM

## 2023-04-10 LAB
25(OH)D3 SERPL-MCNC: 19.6 NG/ML (ref 30–100)
ALBUMIN SERPL-MCNC: 4.3 G/DL (ref 3.5–5.2)
ALBUMIN/GLOB SERPL: 2 G/DL
ALP SERPL-CCNC: 40 U/L (ref 39–117)
ALT SERPL W P-5'-P-CCNC: 25 U/L (ref 1–41)
ANION GAP SERPL CALCULATED.3IONS-SCNC: 10 MMOL/L (ref 5–15)
AST SERPL-CCNC: 18 U/L (ref 1–40)
BACTERIA UR QL AUTO: NORMAL /HPF
BASOPHILS # BLD AUTO: 0.05 10*3/MM3 (ref 0–0.2)
BASOPHILS NFR BLD AUTO: 0.7 % (ref 0–1.5)
BILIRUB SERPL-MCNC: 0.4 MG/DL (ref 0–1.2)
BILIRUB UR QL STRIP: NEGATIVE
BUN SERPL-MCNC: 37 MG/DL (ref 8–23)
BUN/CREAT SERPL: 19.4 (ref 7–25)
CALCIUM SPEC-SCNC: 10 MG/DL (ref 8.6–10.5)
CHLORIDE SERPL-SCNC: 104 MMOL/L (ref 98–107)
CHOLEST SERPL-MCNC: 165 MG/DL (ref 0–200)
CLARITY UR: CLEAR
CO2 SERPL-SCNC: 24 MMOL/L (ref 22–29)
COLOR UR: YELLOW
CREAT SERPL-MCNC: 1.91 MG/DL (ref 0.76–1.27)
CREAT UR-MCNC: 34.8 MG/DL
DEPRECATED RDW RBC AUTO: 41.6 FL (ref 37–54)
EGFRCR SERPLBLD CKD-EPI 2021: 36.1 ML/MIN/1.73
EOSINOPHIL # BLD AUTO: 0.29 10*3/MM3 (ref 0–0.4)
EOSINOPHIL NFR BLD AUTO: 4.2 % (ref 0.3–6.2)
ERYTHROCYTE [DISTWIDTH] IN BLOOD BY AUTOMATED COUNT: 12.8 % (ref 12.3–15.4)
GLOBULIN UR ELPH-MCNC: 2.2 GM/DL
GLUCOSE SERPL-MCNC: 147 MG/DL (ref 65–99)
GLUCOSE UR STRIP-MCNC: NEGATIVE MG/DL
HBA1C MFR BLD: 6.9 % (ref 4.8–5.6)
HCT VFR BLD AUTO: 35 % (ref 37.5–51)
HDLC SERPL-MCNC: 27 MG/DL (ref 40–60)
HGB BLD-MCNC: 11.7 G/DL (ref 13–17.7)
HGB UR QL STRIP.AUTO: NEGATIVE
HYALINE CASTS UR QL AUTO: NORMAL /LPF
IMM GRANULOCYTES # BLD AUTO: 0.02 10*3/MM3 (ref 0–0.05)
IMM GRANULOCYTES NFR BLD AUTO: 0.3 % (ref 0–0.5)
KETONES UR QL STRIP: NEGATIVE
LDLC SERPL CALC-MCNC: 78 MG/DL (ref 0–100)
LDLC/HDLC SERPL: 2.35 {RATIO}
LEUKOCYTE ESTERASE UR QL STRIP.AUTO: NEGATIVE
LYMPHOCYTES # BLD AUTO: 1.87 10*3/MM3 (ref 0.7–3.1)
LYMPHOCYTES NFR BLD AUTO: 27.2 % (ref 19.6–45.3)
MCH RBC QN AUTO: 30.4 PG (ref 26.6–33)
MCHC RBC AUTO-ENTMCNC: 33.4 G/DL (ref 31.5–35.7)
MCV RBC AUTO: 90.9 FL (ref 79–97)
MONOCYTES # BLD AUTO: 0.49 10*3/MM3 (ref 0.1–0.9)
MONOCYTES NFR BLD AUTO: 7.1 % (ref 5–12)
NEUTROPHILS NFR BLD AUTO: 4.15 10*3/MM3 (ref 1.7–7)
NEUTROPHILS NFR BLD AUTO: 60.5 % (ref 42.7–76)
NITRITE UR QL STRIP: NEGATIVE
NRBC BLD AUTO-RTO: 0 /100 WBC (ref 0–0.2)
PH UR STRIP.AUTO: 6 [PH] (ref 5–8)
PHOSPHATE SERPL-MCNC: 3.8 MG/DL (ref 2.5–4.5)
PLATELET # BLD AUTO: 181 10*3/MM3 (ref 140–450)
PMV BLD AUTO: 11.2 FL (ref 6–12)
POTASSIUM SERPL-SCNC: 5 MMOL/L (ref 3.5–5.2)
PROT ?TM UR-MCNC: 99 MG/DL
PROT SERPL-MCNC: 6.5 G/DL (ref 6–8.5)
PROT UR QL STRIP: ABNORMAL
PROT/CREAT UR: 2.84 MG/G{CREAT}
PTH-INTACT SERPL-MCNC: 35.8 PG/ML (ref 15–65)
RBC # BLD AUTO: 3.85 10*6/MM3 (ref 4.14–5.8)
RBC # UR STRIP: NORMAL /HPF
REF LAB TEST METHOD: NORMAL
SODIUM SERPL-SCNC: 138 MMOL/L (ref 136–145)
SP GR UR STRIP: 1.01 (ref 1–1.03)
SQUAMOUS #/AREA URNS HPF: NORMAL /HPF
TRIGL SERPL-MCNC: 373 MG/DL (ref 0–150)
UROBILINOGEN UR QL STRIP: ABNORMAL
VLDLC SERPL-MCNC: 60 MG/DL (ref 5–40)
WBC # UR STRIP: NORMAL /HPF
WBC NRBC COR # BLD: 6.87 10*3/MM3 (ref 3.4–10.8)

## 2023-04-10 PROCEDURE — 82570 ASSAY OF URINE CREATININE: CPT

## 2023-04-10 PROCEDURE — 85025 COMPLETE CBC W/AUTO DIFF WBC: CPT

## 2023-04-10 PROCEDURE — 80053 COMPREHEN METABOLIC PANEL: CPT

## 2023-04-10 PROCEDURE — 83036 HEMOGLOBIN GLYCOSYLATED A1C: CPT

## 2023-04-10 PROCEDURE — 84156 ASSAY OF PROTEIN URINE: CPT

## 2023-04-10 PROCEDURE — 83970 ASSAY OF PARATHORMONE: CPT

## 2023-04-10 PROCEDURE — 84100 ASSAY OF PHOSPHORUS: CPT

## 2023-04-10 PROCEDURE — 81001 URINALYSIS AUTO W/SCOPE: CPT

## 2023-04-10 PROCEDURE — 36415 COLL VENOUS BLD VENIPUNCTURE: CPT

## 2023-04-10 PROCEDURE — 82306 VITAMIN D 25 HYDROXY: CPT

## 2023-04-10 PROCEDURE — 80061 LIPID PANEL: CPT

## 2023-04-11 NOTE — PROGRESS NOTES
"Chief Complaint  Hyperlipidemia (Pt is here for routine follow up. Pt reports no new issues or concerns.)    Subjective          Telly Fitch presents to [facility]     History of Present Illness  Patient pleasant 76-year-old male with underlying hypertension, hyperlipidemia, chronic kidney disease stage 3a, as well as diabetes mellitus, who is coming in 7/22 for routine 4-6-month follow-up.  We will review his labs, address any new concerns, and make new recommendations at that time.    Review of Systems   Constitutional: Negative for appetite change, fatigue and fever.   HENT: Negative for congestion and ear pain.    Eyes: Negative for blurred vision.   Respiratory: Negative for cough, chest tightness, shortness of breath and wheezing.    Cardiovascular: Negative for chest pain, palpitations and leg swelling.   Gastrointestinal: Negative for abdominal pain.   Genitourinary: Negative for difficulty urinating, dysuria and hematuria.   Musculoskeletal: Negative for arthralgias and gait problem.   Skin: Negative for skin lesions.   Neurological: Negative for syncope, memory problem and confusion.   Psychiatric/Behavioral: Negative for self-injury and depressed mood.       Objective   Vital Signs:   /82   Pulse 83   Temp 97 °F (36.1 °C)   Ht 177.8 cm (70\")   Wt 99.4 kg (219 lb 3.2 oz)   SpO2 93%   BMI 31.45 kg/m²           Physical Exam  Vitals and nursing note reviewed.   Constitutional:       General: He is not in acute distress.     Appearance: Normal appearance. He is not toxic-appearing.   HENT:      Head: Atraumatic.      Right Ear: External ear normal.      Left Ear: External ear normal.      Nose: Nose normal.      Mouth/Throat:      Mouth: Mucous membranes are moist.   Eyes:      General:         Right eye: No discharge.         Left eye: No discharge.      Extraocular Movements: Extraocular movements intact.      Pupils: Pupils are equal, round, and " reactive to light.   Cardiovascular:      Rate and Rhythm: Normal rate and regular rhythm.      Pulses: Normal pulses.      Heart sounds: Normal heart sounds. No murmur heard.    No gallop.   Pulmonary:      Effort: Pulmonary effort is normal. No respiratory distress.      Breath sounds: No wheezing, rhonchi or rales.   Abdominal:      General: There is no distension.      Palpations: Abdomen is soft. There is no mass.      Tenderness: There is no abdominal tenderness. There is no guarding.   Musculoskeletal:         General: No swelling or tenderness.      Cervical back: No tenderness.      Right lower leg: No edema.      Left lower leg: No edema.   Skin:     General: Skin is warm and dry.      Findings: No rash.   Neurological:      General: No focal deficit present.      Mental Status: He is alert and oriented to person, place, and time. Mental status is at baseline.      Motor: No weakness.      Gait: Gait normal.   Psychiatric:         Mood and Affect: Mood normal.         Thought Content: Thought content normal.          Result Review :   The following data was reviewed by: Anuel Fisher MD on 09/16/2021:                  Assessment and Plan    Diagnoses and all orders for this visit:    1. Hyperkalemia (Primary)  Assessment & Plan:  Patient's potassium initially dipped down to 4.9, but is back up to 5.6 as of his 7/22 office visit.  The dip corresponded to an improvement in his creatinine down to 1.3, and it is back up to 1.6 now.  He will back off a little bit more on the potassium, and we will see what his levels are running in a couple of months at the time of his appointment with nephrology.      2. Type 2 diabetes mellitus without complication, without long-term current use of insulin ([facility])  Assessment & Plan:  A1c is down to 6.3 as of his 7/22 office visit.  His renal function is down some as well unfortunately, so I recommend that he lower his metformin to 500 mg twice daily.    Orders:  -      metFORMIN (GLUCOPHAGE) 500 MG tablet; Take 1 tablet by mouth 2 (Two) Times a Day.  Dispense: 180 tablet; Refill: 1  -     Hemoglobin A1c; Future    3. Stage 3a chronic kidney disease (HCC)  Assessment & Plan:  Creatinine is back up from 1.3 to 1.6 as of his 7/22 office visit.  He was started back on Lasix, low-dose 10 mg daily by nephrology.  He does not look dry, he does have associated hyperkalemia, so recommend he continue current dosing.    Orders:  -     Basic Metabolic Panel; Future  -     Erythropoietin; Future    4. Mixed hyperlipidemia  Assessment & Plan:  LDL is 74 as of 7/22 OV and that is at goal.  Patient stable to continue with low-dose pravastatin.      5. Benign essential HTN  Assessment & Plan:  Blood pressure remains stable as of his 7/22 office visit.  He is controlled with full dose candesartan, moderate to high-dose doxazosin, and moderate dose carvedilol.  Additionally he is requiring high dose nifedipine.  Renal function stable enough to continue with the full dose ARB for now, we may have to back off on this in the future.      6. Iron deficiency anemia secondary to inadequate dietary iron intake  Overview:  COLON/EGD 4/18...polyps x1...f/u per Dr Duarte...capsule endo neg '19.      Assessment & Plan:  Hemoglobin is down from 12.7 to 11.5 as of his 7/22 office visit.  Additionally his ferritin is over 500 now, so he needs to discontinue the iron supplementation.  This is likely related to the drop in his renal function, will get erythropoietin level as well as repeat iron and B12 on return to office.    Orders:  -     Vitamin B12 anemia; Future  -     Folate anemia; Future  -     CBC & Differential; Future  -     Iron Profile; Future  -     Ferritin; Future    7. Well adult exam  -     Hepatitis C Antibody; Future    8. Vitamin D deficiency  -     Vitamin D 25 Hydroxy; Future     --  --  OLDER NOTES:  VISIT 5/21:  --  HTN typically controlled and please check at home...HCTZ added since last  OV; currently high in AM, ? related to CPAP he quit using; will change flomax to cardura and titrate; if no benefit, then will need to get back on CPAP...try 12.5 HCTZ given urinary c/o...seeing Dr Barrera and I rec 24-hr BP monitor and then review results with him on RTO ( ? related to not being on CPAP)...better 8/18, but will try off HCTZ given incont and increase cardura...has not needed PRN clonidine given at 1/19 urgent visit; stable 2/19...up 3/20 and will see if RYR helps=wife thinks it will...145/70 at home=bring wrist cuff on RTO...his cuff reads about 10 pts high top/bottom, but his numbers are up more at home as well; will max out cardura 9/20---> wel controlled 5/21.  CKD3a stable=1.6 (42%) and d/w no nsaids...stable 10/16 = 44%...54% nice...42% ? realted to recent stone tx...46% is holding...53%...48%...60% is nice to see as of 9/20 OV...55% is fine 1/21---> 42% is a bit concerning b/c of proteinuria, but not too far off his norm.  --  LIPIDS...he's maxed out as far as TG's go, but ; rec add fish oil...TG's down with wt loss and/or fish oil... and defer changes...110...99...114 ok for now 2/19...109 in 3/20 and will stop Tricor and try RYR with the statin...TG's 250 off tricor and on RYR; LDL only 70, so no changes needed---> 80 in 1/21.  CP is vague and has appt with cards next week or two...S/P CATH 4/13 with no signif lesion.  --  DM up some to 6.8, but no change meds needed...ditto for 7.0 after holidays...6.0 with wt loss, so lower amaryl to qd...5.5 so stop it...6.8 so resume 1/2 of 2 mg tab in AM only...he was on 1/2 bid up until just a week ago; A1C 6.0, so ok to stay on 1/2 bid...6.3...6.1...5.9 and rec only take 1/2 in AM...6.4 is fine and will stop it on RTO if same...6.0 and will stop the 2 mg of amaryl now=2/19...6.8...6.3 is better ballpark off it---> 6.3 is same in 5/21.  MICRO-ALB = 140 at 2/18 OV; on max ARB/CCB, so will just monitor for now---> was 500 in '20 and now is  5000 in 5/21 = to RENAL and I will get U/S.  MORBID OBESITY down 25 past 6 mo to 226 at 6/16 OV--->225 holding.  --  PULM NODULES 11/14 (former tobacco screening)...no change 6/15 with f/u needed...LLL 5 mm is stable many years, but RUL 7 mm needs one more...neg and no further rec.  --  UGI BLEED=PUD s/p inject...Dr Duarte 12/12 with neg EGD...Hgb recovered...12.5 so resume iron...13.1 better and likely== CKD...12.7 is fine...13 +  FE DEF ANEMIA and I rec lower to qd for ferritin of 310 at 10/17 OV...12.2 and ferritin better, so stay on qd...had scopes and has f/u with Dr Duarte for path and labs as of 5/18 OV...12.2... 12.7 and ok to stay on for now...12.1 with stable iron is ok...11.4 is wrong direction, so will ask Dr Arguello to eval...she has him on more iron and B12; had capsule endoscopy neg '19 as well=defer to Dr Arguello---> 12.7 in 1/21 with stable iron.  LFT's with mild bump that Dr Duarte noted as well...wnl...still wnl, but agree with checking Hep C... Hep B/C neg.  GERD w/o dysphagia.  --  A.R.....on zyrtec/nasonex/singulair, and sees Dr Charles...had CT per Dr Mosqueda (ENT).  RAD w/o flare.   RUPESH on CPAP @ 15 cmH2O...quit using it past year b/c issues with getting parts?  --  URGENT VISIT 7/19 and 11/20:  R LEG PAIN=twisted knee a few weeks ago, and then fell in hole yesterday; throbs at rest, and painful to walk on; no swelling in knee, and actually says pain is moreso in the hip; decent ROM, but has point tenderness in the greater trochanter; per orders and call Monday.  LBP into L LEG=I d/w him it's not the hip=saw Dr Been last month; no change bowel/bladder; is tender L greater trochanter, neg SLR; will get into PTA for L hip and LBP and add gabapentin/medrol...sxs resolved with just gabapentin and ? with passing kidney stone=d/w try weaning off it now...sxs in L hand and in L leg as of 3/20 OV that may be overuse syndrome=laying tile/etc for past 3 months; will get NCS given weak  and refer to Dr Randhawa  again if worse.  --  KIDNEY STONES per a Dr Benavidez.  BPH/OAB per Urology in [place].  --  PSA 0.3 (7/11/12)...defer  COLON/EGD 4/18...polyps x1...f/u per Dr Duarte...capsule endo neg '19.  Pneumovax x 1; Prevnar rec 10/16 and 8/18. Hep A x1-2.  Neither of them are interested in Covid vaccine.  (, my pt)    Follow Up   Return in about 4 months (around 11/11/2022).  Patient was given instructions and counseling regarding his condition or for health maintenance advice. Please see specific information pulled into the AVS if appropriate.        89

## 2023-04-21 ENCOUNTER — OFFICE VISIT (OUTPATIENT)
Dept: INTERNAL MEDICINE | Facility: CLINIC | Age: 78
End: 2023-04-21
Payer: MEDICARE

## 2023-04-21 VITALS
WEIGHT: 219.8 LBS | HEART RATE: 75 BPM | OXYGEN SATURATION: 98 % | DIASTOLIC BLOOD PRESSURE: 78 MMHG | BODY MASS INDEX: 31.47 KG/M2 | HEIGHT: 70 IN | SYSTOLIC BLOOD PRESSURE: 142 MMHG | TEMPERATURE: 98 F

## 2023-04-21 DIAGNOSIS — E87.5 HYPERKALEMIA: ICD-10-CM

## 2023-04-21 DIAGNOSIS — E78.2 MIXED HYPERLIPIDEMIA: ICD-10-CM

## 2023-04-21 DIAGNOSIS — Z00.00 MEDICARE ANNUAL WELLNESS VISIT, SUBSEQUENT: Primary | ICD-10-CM

## 2023-04-21 DIAGNOSIS — E55.9 VITAMIN D DEFICIENCY: ICD-10-CM

## 2023-04-21 DIAGNOSIS — I10 BENIGN ESSENTIAL HTN: ICD-10-CM

## 2023-04-21 DIAGNOSIS — E11.9 TYPE 2 DIABETES MELLITUS WITHOUT COMPLICATION, WITHOUT LONG-TERM CURRENT USE OF INSULIN: ICD-10-CM

## 2023-04-21 DIAGNOSIS — N18.32 STAGE 3B CHRONIC KIDNEY DISEASE: ICD-10-CM

## 2023-04-21 PROBLEM — N42.9 PROSTATE DISORDER: Status: RESOLVED | Noted: 2021-09-12 | Resolved: 2023-04-21

## 2023-04-21 PROBLEM — N32.9 BLADDER DISORDER: Status: RESOLVED | Noted: 2021-09-12 | Resolved: 2023-04-21

## 2023-04-21 PROBLEM — J30.2 SEASONAL ALLERGIC RHINITIS: Status: RESOLVED | Noted: 2021-09-12 | Resolved: 2023-04-21

## 2023-04-21 RX ORDER — FUROSEMIDE 20 MG/1
10 TABLET ORAL EVERY OTHER DAY
Status: SHIPPED | COMMUNITY
Start: 2023-04-21

## 2023-04-21 RX ORDER — ERGOCALCIFEROL 1.25 MG/1
50000 CAPSULE ORAL
COMMUNITY
Start: 2023-04-19 | End: 2024-04-18

## 2023-04-21 NOTE — ASSESSMENT & PLAN NOTE
A1c was very stable over the winter months, at 6.9 as of his 4/23 office visit.  Despite his renal insufficiency, he is still tolerating low-dose metformin, and nephrology is okay if he stays on this as long as GFR is above 30.  We're considering SGLT2 inhibitor instead, but nephrology wants to hold off on this until he is evaluated by urology.

## 2023-04-21 NOTE — PROGRESS NOTES
The ABCs of the Annual Wellness Visit  Subsequent Medicare Wellness Visit    Subjective      Telly Fitch is a 77 y.o. male who presents for a Subsequent Medicare Wellness Visit.    The following portions of the patient's history were reviewed and   updated as appropriate: allergies, current medications, past family history, past medical history, past social history, past surgical history and problem list.    Compared to one year ago, the patient feels his physical   health is the same.    Compared to one year ago, the patient feels his mental   health is the same.    Recent Hospitalizations:  He was not admitted to the hospital during the last year.       Current Medical Providers:  Patient Care Team:  Anuel Fisher MD as PCP - General (Internal Medicine)    Outpatient Medications Prior to Visit   Medication Sig Dispense Refill   • albuterol sulfate  (90 Base) MCG/ACT inhaler As Needed.     • ascorbic acid (VITAMIN C) 1000 MG tablet 1 tablet 2 (Two) Times a Day.     • aspirin 81 MG chewable tablet aspirin 81 mg oral tablet,chewable chew 1 tablet (81 mg) by oral route once daily   Suspended     • candesartan (ATACAND) 32 MG tablet Take 1 tablet by mouth Every Night for 90 days. 90 tablet 1   • carvedilol (COREG) 12.5 MG tablet Take 1 tablet by mouth 2 (Two) Times a Day. 180 tablet 1   • dicyclomine (BENTYL) 20 MG tablet As Needed.     • doxazosin (CARDURA) 2 MG tablet Take 4 tablets by mouth Daily for 90 days. 360 tablet 3   • ergocalciferol (ERGOCALCIFEROL) 1.25 MG (05029 UT) capsule Take 1 capsule by mouth.     • erythromycin (ROMYCIN) 5 MG/GM ophthalmic ointment Administer  to the right eye 3 (Three) Times a Day.     • finasteride (PROSCAR) 5 MG tablet Take 1 tablet by mouth Daily. 90 tablet 1   • fluticasone (Flonase) 50 MCG/ACT nasal spray 2 sprays by Each Nare route Daily. 48 g 3   • fluticasone-salmeterol (ADVAIR) 250-50 MCG/DOSE DISKUS Inhale 1 puff 2 (Two) Times a Day.     • furosemide  (LASIX) 20 MG tablet Take 0.5 tablets by mouth Every Other Day.     • loratadine (CLARITIN) 10 MG tablet Take 1 tablet by mouth Daily for 90 days. 90 tablet 1   • metFORMIN (GLUCOPHAGE) 500 MG tablet Take 1 tablet by mouth 2 (Two) Times a Day. 180 tablet 1   • montelukast (SINGULAIR) 10 MG tablet Take 1 tablet by mouth Every Night. 90 tablet 3   • multivitamin (THERAGRAN) tablet tablet Take  by mouth Daily.     • NIFEdipine XL (PROCARDIA XL) 90 MG 24 hr tablet Take 1 tablet by mouth Daily. 90 tablet 1   • nitroglycerin (Nitrostat) 0.4 MG SL tablet Place 1 tablet under the tongue Every 5 (Five) Minutes As Needed for Chest Pain. Take no more than 3 doses in 15 minutes. 1 tablet 1   • omeprazole (PrilOSEC) 20 MG capsule Take 1 capsule by mouth Daily. 90 capsule 3   • pravastatin (PRAVACHOL) 20 MG tablet Take 1 tablet by mouth Every Night. 90 tablet 1   • Red Yeast Rice 600 MG capsule Take 1 capsule by mouth Daily. 90 each 1   • sodium bicarbonate 650 MG tablet Take 1 tablet by mouth 3 (Three) Times a Day.     • tolterodine LA (DETROL LA) 2 MG 24 hr capsule Take 1 capsule by mouth Daily. 90 capsule 3   • vitamin B-12 (CYANOCOBALAMIN) 1000 MCG tablet Take 1 tablet by mouth Daily. 90 tablet 1   • vitamin E 400 UNIT capsule Take 1 capsule by mouth Daily.     • Zinc 50 MG tablet Take  by mouth Daily.     • furosemide (LASIX) 20 MG tablet Take 10 mg by mouth Daily.     • NIFEdipine CC (ADALAT CC) 90 MG 24 hr tablet Take 1 tablet by mouth Daily.       No facility-administered medications prior to visit.       No opioid medication identified on active medication list. I have reviewed chart for other potential  high risk medication/s and harmful drug interactions in the elderly.          Aspirin is on active medication list. Aspirin use is indicated based on review of current medical condition/s. Pros and cons of this therapy have been discussed today. Benefits of this medication outweigh potential harm.  Patient has been  "encouraged to continue taking this medication.  .      Patient Active Problem List   Diagnosis   • Benign essential HTN   • Radiculopathy due to lumbar intervertebral disc disorder   • Obstructive sleep apnea   • Mixed hyperlipidemia   • Iron deficiency anemia secondary to inadequate dietary iron intake   • Type 2 diabetes mellitus without complication, without long-term current use of insulin (HCC)   • History of peptic ulcer disease   • Vitamin D deficiency   • Coronary arteriosclerosis   • Hyperkalemia   • Mild intermittent asthma without complication   • Stage 3b chronic kidney disease   • Medicare annual wellness visit, subsequent     Advance Care Planning   Advance Care Planning     Advance Directive is on file.  ACP discussion was held with the patient during this visit. They do have their own living will, but the 1 on file at the hospital is not valid.     Objective    Vitals:    04/21/23 1046   BP: 142/78   Pulse: 75   Temp: 98 °F (36.7 °C)   TempSrc: Skin   SpO2: 98%   Weight: 99.7 kg (219 lb 12.8 oz)   Height: 177.8 cm (70\")     Estimated body mass index is 31.54 kg/m² as calculated from the following:    Height as of this encounter: 177.8 cm (70\").    Weight as of this encounter: 99.7 kg (219 lb 12.8 oz).    BMI is >= 30 and <35. (Class 1 Obesity). The following options were offered after discussion;: exercise counseling/recommendations and nutrition counseling/recommendations      Does the patient have evidence of cognitive impairment?   No    Lab Results   Component Value Date    TRIG 373 (H) 04/10/2023    HDL 27 (L) 04/10/2023    LDL 78 04/10/2023    VLDL 60 (H) 04/10/2023    HGBA1C 6.90 (H) 04/10/2023          HEALTH RISK ASSESSMENT    Smoking Status:  Social History     Tobacco Use   Smoking Status Never   Smokeless Tobacco Never     Alcohol Consumption:  Social History     Substance and Sexual Activity   Alcohol Use Never     Fall Risk Screen:    STEADI Fall Risk Assessment was completed, and " patient is at LOW risk for falls.Assessment completed on:2023    Depression Screenin/21/2023    10:48 AM   PHQ-2/PHQ-9 Depression Screening   Little Interest or Pleasure in Doing Things 0-->not at all   Feeling Down, Depressed or Hopeless 0-->not at all   PHQ-9: Brief Depression Severity Measure Score 0       Health Habits and Functional and Cognitive Screenin/21/2023    10:00 AM   Functional & Cognitive Status   Do you have difficulty preparing food and eating? No   Do you have difficulty bathing yourself, getting dressed or grooming yourself? No   Do you have difficulty using the toilet? No   Do you have difficulty moving around from place to place? No   Do you have trouble with steps or getting out of a bed or a chair? No   Current Diet Well Balanced Diet   Do you need help using the phone?  No   Are you deaf or do you have serious difficulty hearing?  No   Do you need help with transportation? No   Do you need help shopping? No   Do you need help preparing meals?  No   Do you need help with housework?  No   Do you need help with laundry? No   Do you need help taking your medications? No   Do you need help managing money? No   Do you ever drive or ride in a car without wearing a seat belt? No   Do you have difficulty concentrating, remembering or making decisions? No       Age-appropriate Screening Schedule:  Refer to the list below for future screening recommendations based on patient's age, sex and/or medical conditions. Orders for these recommended tests are listed in the plan section. The patient has been provided with a written plan.    Health Maintenance   Topic Date Due   • Hepatitis B (1 of 3 - 3-dose series) Never done   • Pneumococcal Vaccine 65+ (1 - PCV) Never done   • ANNUAL WELLNESS VISIT  Never done   • DIABETIC FOOT EXAM  Never done   • INFLUENZA VACCINE  2023   • HEMOGLOBIN A1C  10/10/2023   • DIABETIC EYE EXAM  10/10/2023   • LIPID PANEL  04/10/2024   • URINE  MICROALBUMIN  04/10/2024   • TDAP/TD VACCINES (2 - Td or Tdap) 12/14/2028   • HEPATITIS C SCREENING  Completed   • COVID-19 Vaccine  Discontinued   • ZOSTER VACCINE  Discontinued   • COLORECTAL CANCER SCREENING  Discontinued                  CMS Preventative Services Quick Reference  Risk Factors Identified During Encounter:    None Identified    The above risks/problems have been discussed with the patient.  Pertinent information has been shared with the patient in the After Visit Summary.    Diagnoses and all orders for this visit:    1. Medicare annual wellness visit, subsequent (Primary)  Assessment & Plan:  AWV was completed 4/23.  Patient woody active and independent, no falls no hospitalizations past year.  His wife is his medical POA, if she is unable to speak for him, their son Telly Tariq would be the next in line.      2. Benign essential HTN  Assessment & Plan:  Blood pressure stable, he will continue with full doses of candesartan and doxazosin as well as moderate doses of nifedipine and carvedilol.  Patient is also on very low-dose diuretic continue same      3. Type 2 diabetes mellitus without complication, without long-term current use of insulin ([facility])  Assessment & Plan:  A1c was very stable over the winter months, at 6.9 as of his 4/23 office visit.  Despite his renal insufficiency, he is still tolerating low-dose metformin, and nephrology is okay if he stays on this as long as GFR is above 30.  We're considering SGLT2 inhibitor instead, but nephrology wants to hold off on this until he is evaluated by urology.      4. Stage 3b chronic kidney disease  Assessment & Plan:  GFR is down slightly to 36 as of 4/23.  He is followed by nephrology, saw Dr. Guerra earlier this week, no changes made.  He does look a little prerenal on these labs, if he could tolerate every other day dosing of 10 mg Lasix without any increased swelling or increased blood pressure, that will probably be better in the long  run.      5. Mixed hyperlipidemia  Assessment & Plan:  LDL of 78 remains at goal as of 4/23.  Patient can continue with just low-dose pravastatin.      6. Hyperkalemia  Assessment & Plan:  Likely due to the azotemia, his potassium is up slightly to 5.0 as of 4/23.  He is continuing to follow a low potassium diet, repeat labs as scheduled.      7. Vitamin D deficiency  Assessment & Plan:  Down from 36 to 19 as of 4/23 office visit.  He is being switched to once weekly prescription therapy by nephrology, we will see what labs show on return to office.        Follow Up:   Next Medicare Wellness visit to be scheduled in 1 year.      An After Visit Summary and PPPS were made available to the patient.

## 2023-04-21 NOTE — ASSESSMENT & PLAN NOTE
GFR is down slightly to 36 as of 4/23.  He is followed by nephrology, saw Dr. Guerra earlier this week, no changes made.  He does look a little prerenal on these labs, if he could tolerate every other day dosing of 10 mg Lasix without any increased swelling or increased blood pressure, that will probably be better in the long run.

## 2023-04-21 NOTE — ASSESSMENT & PLAN NOTE
Likely due to the azotemia, his potassium is up slightly to 5.0 as of 4/23.  He is continuing to follow a low potassium diet, repeat labs as scheduled.

## 2023-04-21 NOTE — ASSESSMENT & PLAN NOTE
AWV was completed 4/23.  Patient woody active and independent, no falls no hospitalizations past year.  His wife is his medical POA, if she is unable to speak for him, their son Telly Tariq would be the next in line.

## 2023-04-21 NOTE — PROGRESS NOTES
"Chief Complaint  Diabetes and Follow-up (Pt states that this is routine, he had labs. He has no new issues. )    Subjective          Telly Fitch presents to [facility]     History of Present Illness  Patient pleasant 77-year-old male with underlying hypertension, hyperlipidemia, chronic kidney disease stage 3a, as well as diabetes mellitus, who is coming in 4/23 for routine 3-4-month follow-up.  We will review his labs, address any new concerns, and make new recommendations at that time.    Review of Systems   Constitutional: Negative for appetite change, fatigue and fever.   HENT: Negative for congestion and ear pain.    Eyes: Negative for blurred vision.   Respiratory: Negative for cough, chest tightness, shortness of breath and wheezing.    Cardiovascular: Negative for chest pain, palpitations and leg swelling.   Gastrointestinal: Negative for abdominal pain.   Genitourinary: Negative for difficulty urinating, dysuria and hematuria.   Musculoskeletal: Negative for arthralgias and gait problem.   Skin: Negative for skin lesions.   Neurological: Negative for syncope, memory problem and confusion.   Psychiatric/Behavioral: Negative for self-injury and depressed mood.       Objective   Vital Signs:   /78   Pulse 75   Temp 98 °F (36.7 °C) (Skin)   Ht 177.8 cm (70\")   Wt 99.7 kg (219 lb 12.8 oz)   SpO2 98%   BMI 31.54 kg/m²           Physical Exam  Vitals and nursing note reviewed.   Constitutional:       General: He is not in acute distress.     Appearance: Normal appearance. He is not toxic-appearing.   HENT:      Head: Atraumatic.      Right Ear: External ear normal.      Left Ear: External ear normal.      Nose: Nose normal.      Mouth/Throat:      Mouth: Mucous membranes are moist.   Eyes:      General:         Right eye: No discharge.         Left eye: No discharge.      Extraocular Movements: Extraocular movements intact.      Pupils: Pupils are equal, " round, and reactive to light.   Cardiovascular:      Rate and Rhythm: Normal rate and regular rhythm.      Pulses: Normal pulses.      Heart sounds: Normal heart sounds. No murmur heard.    No gallop.   Pulmonary:      Effort: Pulmonary effort is normal. No respiratory distress.      Breath sounds: No wheezing, rhonchi or rales.   Abdominal:      General: There is no distension.      Palpations: Abdomen is soft. There is no mass.      Tenderness: There is no abdominal tenderness. There is no guarding.   Musculoskeletal:         General: No swelling or tenderness.      Cervical back: No tenderness.      Right lower leg: No edema.      Left lower leg: No edema.   Skin:     General: Skin is warm and dry.      Findings: No rash.   Neurological:      General: No focal deficit present.      Mental Status: He is alert and oriented to person, place, and time. Mental status is at baseline.      Motor: No weakness.      Gait: Gait normal.   Psychiatric:         Mood and Affect: Mood normal.         Thought Content: Thought content normal.          Result Review :   The following data was reviewed by: Anuel Fisher MD on 09/16/2021:                  Assessment and Plan    Diagnoses and all orders for this visit:    1. Medicare annual wellness visit, subsequent (Primary)  Assessment & Plan:  AWV was completed 4/23.  Patient woody active and independent, no falls no hospitalizations past year.  His wife is his medical POA, if she is unable to speak for him, their son Telly Tariq would be the next in line.    Orders:  -     TSH; Future    2. Benign essential HTN  Assessment & Plan:  Blood pressure stable, he will continue with full doses of candesartan and doxazosin as well as moderate doses of nifedipine and carvedilol.  Patient is also on very low-dose diuretic continue same      3. Type 2 diabetes mellitus without complication, without long-term current use of insulin ([facility])  Assessment & Plan:  A1c was very stable over the  winter months, at 6.9 as of his 4/23 office visit.  Despite his renal insufficiency, he is still tolerating low-dose metformin, and nephrology is okay if he stays on this as long as GFR is above 30.  We're considering SGLT2 inhibitor instead, but nephrology wants to hold off on this until he is evaluated by urology.    Orders:  -     Hemoglobin A1c; Future    4. Stage 3b chronic kidney disease  Assessment & Plan:  GFR is down slightly to 36 as of 4/23.  He is followed by nephrology, saw Dr. Guerra earlier this week, no changes made.  He does look a little prerenal on these labs, if he could tolerate every other day dosing of 10 mg Lasix without any increased swelling or increased blood pressure, that will probably be better in the long run.    Orders:  -     Basic Metabolic Panel; Future    5. Mixed hyperlipidemia  Assessment & Plan:  LDL of 78 remains at goal as of 4/23.  Patient can continue with just low-dose pravastatin.      6. Hyperkalemia  Assessment & Plan:  Likely due to the azotemia, his potassium is up slightly to 5.0 as of 4/23.  He is continuing to follow a low potassium diet, repeat labs as scheduled.      7. Vitamin D deficiency  Assessment & Plan:  Down from 36 to 19 as of 4/23 office visit.  He is being switched to once weekly prescription therapy by nephrology, we will see what labs show on return to office.    Orders:  -     Vitamin D,25-Hydroxy; Future     --  --  OLDER NOTES:  VISIT 5/21:  --  HTN typically controlled and please check at home...HCTZ added since last OV; currently high in AM, ? related to CPAP he quit using; will change flomax to cardura and titrate; if no benefit, then will need to get back on CPAP...try 12.5 HCTZ given urinary c/o...seeing Dr Barrera and I rec 24-hr BP monitor and then review results with him on RTO ( ? related to not being on CPAP)...better 8/18, but will try off HCTZ given incont and increase cardura...has not needed PRN clonidine given at 1/19 urgent visit;  stable 2/19...up 3/20 and will see if RYR helps=wife thinks it will...145/70 at home=bring wrist cuff on RTO...his cuff reads about 10 pts high top/bottom, but his numbers are up more at home as well; will max out cardura 9/20---> wel controlled 5/21.  CKD3a stable=1.6 (42%) and d/w no nsaids...stable 10/16 = 44%...54% nice...42% ? realted to recent stone tx...46% is holding...53%...48%...60% is nice to see as of 9/20 OV...55% is fine 1/21---> 42% is a bit concerning b/c of proteinuria, but not too far off his norm.  --  LIPIDS...he's maxed out as far as TG's go, but ; rec add fish oil...TG's down with wt loss and/or fish oil... and defer changes...110...99...114 ok for now 2/19...109 in 3/20 and will stop Tricor and try RYR with the statin...TG's 250 off tricor and on RYR; LDL only 70, so no changes needed---> 80 in 1/21.  CP is vague and has appt with cards next week or two...S/P CATH 4/13 with no signif lesion.  --  DM up some to 6.8, but no change meds needed...ditto for 7.0 after holidays...6.0 with wt loss, so lower amaryl to qd...5.5 so stop it...6.8 so resume 1/2 of 2 mg tab in AM only...he was on 1/2 bid up until just a week ago; A1C 6.0, so ok to stay on 1/2 bid...6.3...6.1...5.9 and rec only take 1/2 in AM...6.4 is fine and will stop it on RTO if same...6.0 and will stop the 2 mg of amaryl now=2/19...6.8...6.3 is better ballpark off it---> 6.3 is same in 5/21.  MICRO-ALB = 140 at 2/18 OV; on max ARB/CCB, so will just monitor for now---> was 500 in '20 and now is 5000 in 5/21 = to RENAL and I will get U/S.  MORBID OBESITY down 25 past 6 mo to 226 at 6/16 OV--->225 holding.  --  PULM NODULES 11/14 (former tobacco screening)...no change 6/15 with f/u needed...LLL 5 mm is stable many years, but RUL 7 mm needs one more...neg and no further rec.  --  UGI BLEED=PUD s/p inject...Dr Duarte 12/12 with neg EGD...Hgb recovered...12.5 so resume iron...13.1 better and likely== CKD...12.7 is fine...13  +  FE DEF ANEMIA and I rec lower to qd for ferritin of 310 at 10/17 OV...12.2 and ferritin better, so stay on qd...had scopes and has f/u with Dr Duarte for path and labs as of 5/18 OV...12.2... 12.7 and ok to stay on for now...12.1 with stable iron is ok...11.4 is wrong direction, so will ask Dr Arguello to eval...she has him on more iron and B12; had capsule endoscopy neg '19 as well=defer to Dr Arguello---> 12.7 in 1/21 with stable iron.  LFT's with mild bump that Dr Duarte noted as well...wnl...still wnl, but agree with checking Hep C... Hep B/C neg.  GERD w/o dysphagia.  --  A.R.....on zyrtec/nasonex/singulair, and sees Dr Charles...had CT per Dr Mosqueda (ENT).  RAD w/o flare.   RUPESH on CPAP @ 15 cmH2O...quit using it past year b/c issues with getting parts?  --  URGENT VISIT 7/19 and 11/20:  R LEG PAIN=twisted knee a few weeks ago, and then fell in hole yesterday; throbs at rest, and painful to walk on; no swelling in knee, and actually says pain is moreso in the hip; decent ROM, but has point tenderness in the greater trochanter; per orders and call Monday.  LBP into L LEG=I d/w him it's not the hip=saw Dr Meredith last month; no change bowel/bladder; is tender L greater trochanter, neg SLR; will get into PTA for L hip and LBP and add gabapentin/medrol...sxs resolved with just gabapentin and ? with passing kidney stone=d/w try weaning off it now...sxs in L hand and in L leg as of 3/20 OV that may be overuse syndrome=laying tile/etc for past 3 months; will get NCS given weak  and refer to Dr Randhawa again if worse.  --  KIDNEY STONES per a Dr Benavidez.  BPH/OAB per Urology in [place].  --  PSA 0.3 (7/11/12)...defer  COLON/EGD 4/18...polyps x1...f/u per Dr Duarte...capsule endo neg '19.  Pneumovax x 1 only.  (, my pt, 2 children)    Follow Up   No follow-ups on file.  Patient was given instructions and counseling regarding his condition or for health maintenance advice. Please see specific information pulled into  the AVS if appropriate.

## 2023-04-21 NOTE — ASSESSMENT & PLAN NOTE
Blood pressure stable, he will continue with full doses of candesartan and doxazosin as well as moderate doses of nifedipine and carvedilol.  Patient is also on very low-dose diuretic continue same

## 2023-04-24 RX ORDER — CANDESARTAN 32 MG/1
32 TABLET ORAL NIGHTLY
Qty: 90 TABLET | Refills: 1 | Status: SHIPPED | OUTPATIENT
Start: 2023-04-24 | End: 2023-07-23

## 2023-04-24 RX ORDER — DOXAZOSIN 2 MG/1
8 TABLET ORAL EVERY 24 HOURS
Qty: 360 TABLET | Refills: 3 | Status: SHIPPED | OUTPATIENT
Start: 2023-04-24 | End: 2023-07-23

## 2023-04-24 NOTE — TELEPHONE ENCOUNTER
Rx Refill Note  Requested Prescriptions     Pending Prescriptions Disp Refills   • doxazosin (CARDURA) 2 MG tablet 360 tablet 3     Sig: Take 4 tablets by mouth Daily for 90 days.   • candesartan (ATACAND) 32 MG tablet 90 tablet 1     Sig: Take 1 tablet by mouth Every Night for 90 days.      Last office visit with prescribing clinician: 4/21/2023   Last telemedicine visit with prescribing clinician: 8/22/2023   Next office visit with prescribing clinician: 8/22/2023                         Would you like a call back once the refill request has been completed: [] Yes [] No    If the office needs to give you a call back, can they leave a voicemail: [] Yes [] No    Teresa Jenkins MA  04/24/23, 12:05 EDT

## 2023-04-28 ENCOUNTER — TELEPHONE (OUTPATIENT)
Dept: INTERNAL MEDICINE | Facility: CLINIC | Age: 78
End: 2023-04-28
Payer: MEDICARE

## 2023-04-28 DIAGNOSIS — D50.9 IRON DEFICIENCY ANEMIA, UNSPECIFIED IRON DEFICIENCY ANEMIA TYPE: ICD-10-CM

## 2023-04-28 DIAGNOSIS — R53.83 OTHER FATIGUE: Primary | ICD-10-CM

## 2023-04-28 NOTE — TELEPHONE ENCOUNTER
Pt is weak and feels that he needs PRB cells again. He scheduled an appt with you on Monday, do you want to order any labs for him to do on Monday morning prior to seeing him?  I can leave a detailed message for pt on answering machine.

## 2023-04-30 PROBLEM — R53.1 GENERALIZED WEAKNESS: Status: ACTIVE | Noted: 2023-04-30

## 2023-04-30 NOTE — PROGRESS NOTES
"Chief Complaint  Nose Bleed (Pt blows his nose and it is bright red. The last couple of days he has been very weak. His stool is getting darker and the last time he did this he had a bleeding ulcer. ) and Chest Pain (Pt states at times he gets a sharp pain through his chest. )    Subjective          Telly Fitch presents to [facility]     History of Present Illness  Patient pleasant 77-year-old male with underlying hypertension, hyperlipidemia, chronic kidney disease stage 3a, as well as diabetes mellitus, who is coming in 4/23 for routine 3-4-month follow-up.  We will review his labs, address any new concerns, and make new recommendations at that time.---> here 5/23 for urgent issue regarding progressive weakness.    Review of Systems   Constitutional: Negative for appetite change, fatigue and fever.   HENT: Negative for congestion and ear pain.    Eyes: Negative for blurred vision.   Respiratory: Negative for cough, chest tightness, shortness of breath and wheezing.    Cardiovascular: Negative for chest pain, palpitations and leg swelling.   Gastrointestinal: Negative for abdominal pain.   Genitourinary: Negative for difficulty urinating, dysuria and hematuria.   Musculoskeletal: Negative for arthralgias and gait problem.   Skin: Negative for skin lesions.   Neurological: Negative for syncope, memory problem and confusion.   Psychiatric/Behavioral: Negative for self-injury and depressed mood.       Objective   Vital Signs:   /90   Pulse 60   Temp 98.7 °F (37.1 °C) (Skin)   Ht 177.8 cm (70\")   Wt 101 kg (222 lb 12.8 oz)   SpO2 94%   BMI 31.97 kg/m²           Physical Exam  Vitals and nursing note reviewed.   Constitutional:       General: He is not in acute distress.     Appearance: Normal appearance. He is not toxic-appearing.   HENT:      Head: Atraumatic.      Right Ear: External ear normal.      Left Ear: External ear normal.      Nose: Nose normal.      " Mouth/Throat:      Mouth: Mucous membranes are moist.   Eyes:      General:         Right eye: No discharge.         Left eye: No discharge.      Extraocular Movements: Extraocular movements intact.      Pupils: Pupils are equal, round, and reactive to light.   Cardiovascular:      Rate and Rhythm: Normal rate and regular rhythm.      Pulses: Normal pulses.      Heart sounds: Normal heart sounds. No murmur heard.    No gallop.   Pulmonary:      Effort: Pulmonary effort is normal. No respiratory distress.      Breath sounds: No wheezing, rhonchi or rales.   Abdominal:      General: There is no distension.      Palpations: Abdomen is soft. There is no mass.      Tenderness: There is no abdominal tenderness. There is no guarding.   Musculoskeletal:         General: No swelling or tenderness.      Cervical back: No tenderness.      Right lower leg: No edema.      Left lower leg: No edema.   Skin:     General: Skin is warm and dry.      Findings: No rash.   Neurological:      General: No focal deficit present.      Mental Status: He is alert and oriented to person, place, and time. Mental status is at baseline.      Motor: No weakness.      Gait: Gait normal.   Psychiatric:         Mood and Affect: Mood normal.         Thought Content: Thought content normal.          Result Review :   The following data was reviewed by: Anuel Fisher MD on 09/16/2021:                  Assessment and Plan    Diagnoses and all orders for this visit:    1. Generalized weakness (Primary)  Assessment & Plan:  His hemoglobin is stable around 12 as of his 5/23 urgent visit for this.  Other studies are pending, to include B12, liver studies, TSH, etc. These studies have been within normal limits when checked here recently, it has been 10 years since he has had any type of study on his heart, he has numerous cardiac risk factors obviously, so we will get stress imaging studies and make further recommendations at that time.    Orders:  -      TSH+Free T4; Future  -     Stress Test With Myocardial Perfusion One Day; Future    2. Benign essential HTN  Assessment & Plan:  Blood pressure fairly stable as of 5/23, continue full dose candesartan/doxazosin and moderate dose nifedipine/carvedilol.      3. Iron deficiency anemia secondary to inadequate dietary iron intake  Overview:  COLON/EGD 4/18...polyps x1...f/u per Dr Duarte...capsule endo neg '19.      Orders:  -     Ambulatory Referral to General Surgery    4. Stage 3b chronic kidney disease  Assessment & Plan:  Patient did not tolerate every other day dosing of the Lasix as discussed below, so he is resumed daily dosing.      5. Melanotic stools  -     Occult Blood X 3, Stool - Stool, Per Rectum; Future  -     Ambulatory Referral to General Surgery    6. Diarrhea, unspecified type  -     Ambulatory Referral to General Surgery    7. Dyspnea on exertion  -     Stress Test With Myocardial Perfusion One Day; Future    8. History of peptic ulcer disease  Assessment & Plan:  Patient with concern for possible early ulcer as of his 5/23 office visit.  He has had some melanotic sounding stools, studies are pending.  He is also having some nosebleeds.  We will stop his Flonase first, if that does not help, he will lower the baby aspirin to every other day.  He had a CBC the day of this visit, platelets are fine.      Other orders  -     vitamin B-12 (CYANOCOBALAMIN) 1000 MCG tablet; Take 1 tablet by mouth Daily.  Dispense: 90 tablet; Refill: 1  -     furosemide (LASIX) 20 MG tablet; Take 0.5 tablets by mouth Daily.  Dispense: 45 tablet; Refill: 1     --  --  OLDER NOTES:  VISIT 5/21:  --  HTN typically controlled and please check at home...HCTZ added since last OV; currently high in AM, ? related to CPAP he quit using; will change flomax to cardura and titrate; if no benefit, then will need to get back on CPAP...try 12.5 HCTZ given urinary c/o...seeing Dr Barrera and I rec 24-hr BP monitor and then review results  with him on RTO ( ? related to not being on CPAP)...better 8/18, but will try off HCTZ given incont and increase cardura...has not needed PRN clonidine given at 1/19 urgent visit; stable 2/19...up 3/20 and will see if RYR helps=wife thinks it will...145/70 at home=bring wrist cuff on RTO...his cuff reads about 10 pts high top/bottom, but his numbers are up more at home as well; will max out cardura 9/20---> wel controlled 5/21.  CKD3a stable=1.6 (42%) and d/w no nsaids...stable 10/16 = 44%...54% nice...42% ? realted to recent stone tx...46% is holding...53%...48%...60% is nice to see as of 9/20 OV...55% is fine 1/21---> 42% is a bit concerning b/c of proteinuria, but not too far off his norm.  --  LIPIDS...he's maxed out as far as TG's go, but ; rec add fish oil...TG's down with wt loss and/or fish oil... and defer changes...110...99...114 ok for now 2/19...109 in 3/20 and will stop Tricor and try RYR with the statin...TG's 250 off tricor and on RYR; LDL only 70, so no changes needed---> 80 in 1/21.  CP is vague and has appt with cards next week or two...S/P CATH 4/13 with no signif lesion.  --  DM up some to 6.8, but no change meds needed...ditto for 7.0 after holidays...6.0 with wt loss, so lower amaryl to qd...5.5 so stop it...6.8 so resume 1/2 of 2 mg tab in AM only...he was on 1/2 bid up until just a week ago; A1C 6.0, so ok to stay on 1/2 bid...6.3...6.1...5.9 and rec only take 1/2 in AM...6.4 is fine and will stop it on RTO if same...6.0 and will stop the 2 mg of amaryl now=2/19...6.8...6.3 is better ballpark off it---> 6.3 is same in 5/21.  MICRO-ALB = 140 at 2/18 OV; on max ARB/CCB, so will just monitor for now---> was 500 in '20 and now is 5000 in 5/21 = to RENAL and I will get U/S.  MORBID OBESITY down 25 past 6 mo to 226 at 6/16 OV--->225 holding.  --  PULM NODULES 11/14 (former tobacco screening)...no change 6/15 with f/u needed...LLL 5 mm is stable many years, but RUL 7 mm needs one  more...neg and no further rec.  --  UGI BLEED=PUD s/p inject...Dr Duarte 12/12 with neg EGD...Hgb recovered...12.5 so resume iron...13.1 better and likely== CKD...12.7 is fine...13 +  FE DEF ANEMIA and I rec lower to qd for ferritin of 310 at 10/17 OV...12.2 and ferritin better, so stay on qd...had scopes and has f/u with Dr Duarte for path and labs as of 5/18 OV...12.2... 12.7 and ok to stay on for now...12.1 with stable iron is ok...11.4 is wrong direction, so will ask Dr Arguello to eval...she has him on more iron and B12; had capsule endoscopy neg '19 as well=defer to Dr Arguello---> 12.7 in 1/21 with stable iron.  LFT's with mild bump that Dr Duarte noted as well...wnl...still wnl, but agree with checking Hep C... Hep B/C neg.  GERD w/o dysphagia.  --  A.R.....on zyrtec/nasonex/singulair, and sees Dr Charles...had CT per Dr Mosqueda (ENT).  RAD w/o flare.   RUPESH on CPAP @ 15 cmH2O...quit using it past year b/c issues with getting parts?  --  URGENT VISIT 7/19 and 11/20:  R LEG PAIN=twisted knee a few weeks ago, and then fell in hole yesterday; throbs at rest, and painful to walk on; no swelling in knee, and actually says pain is moreso in the hip; decent ROM, but has point tenderness in the greater trochanter; per orders and call Monday.  LBP into L LEG=I d/w him it's not the hip=saw Dr Been last month; no change bowel/bladder; is tender L greater trochanter, neg SLR; will get into PTA for L hip and LBP and add gabapentin/medrol...sxs resolved with just gabapentin and ? with passing kidney stone=d/w try weaning off it now...sxs in L hand and in L leg as of 3/20 OV that may be overuse syndrome=laying tile/etc for past 3 months; will get NCS given weak  and refer to Dr Randhawa again if worse.  --  KIDNEY STONES per a Dr Benavidez.  BPH/OAB per Urology in [place].  --  PSA 0.3 (7/11/12)...defer  COLON/EGD 4/18...polyps x1...f/u per Dr Duarte...capsule endo neg '19.  Pneumovax x 1 only.  (, my pt, 2  children)    Follow Up   Return for Next scheduled follow up.  Patient was given instructions and counseling regarding his condition or for health maintenance advice. Please see specific information pulled into the AVS if appropriate.

## 2023-05-01 ENCOUNTER — OFFICE VISIT (OUTPATIENT)
Dept: INTERNAL MEDICINE | Facility: CLINIC | Age: 78
End: 2023-05-01
Payer: MEDICARE

## 2023-05-01 ENCOUNTER — LAB (OUTPATIENT)
Dept: LAB | Facility: HOSPITAL | Age: 78
End: 2023-05-01
Payer: MEDICARE

## 2023-05-01 VITALS
SYSTOLIC BLOOD PRESSURE: 146 MMHG | TEMPERATURE: 98.7 F | OXYGEN SATURATION: 94 % | HEART RATE: 60 BPM | BODY MASS INDEX: 31.9 KG/M2 | WEIGHT: 222.8 LBS | DIASTOLIC BLOOD PRESSURE: 90 MMHG | HEIGHT: 70 IN

## 2023-05-01 DIAGNOSIS — N18.32 STAGE 3B CHRONIC KIDNEY DISEASE: ICD-10-CM

## 2023-05-01 DIAGNOSIS — R53.1 GENERALIZED WEAKNESS: ICD-10-CM

## 2023-05-01 DIAGNOSIS — D50.8 IRON DEFICIENCY ANEMIA SECONDARY TO INADEQUATE DIETARY IRON INTAKE: ICD-10-CM

## 2023-05-01 DIAGNOSIS — R06.09 DYSPNEA ON EXERTION: ICD-10-CM

## 2023-05-01 DIAGNOSIS — D50.9 IRON DEFICIENCY ANEMIA, UNSPECIFIED IRON DEFICIENCY ANEMIA TYPE: ICD-10-CM

## 2023-05-01 DIAGNOSIS — Z87.11 HISTORY OF PEPTIC ULCER DISEASE: ICD-10-CM

## 2023-05-01 DIAGNOSIS — K92.1 MELANOTIC STOOLS: ICD-10-CM

## 2023-05-01 DIAGNOSIS — R53.1 GENERALIZED WEAKNESS: Primary | ICD-10-CM

## 2023-05-01 DIAGNOSIS — I10 BENIGN ESSENTIAL HTN: ICD-10-CM

## 2023-05-01 DIAGNOSIS — R53.83 OTHER FATIGUE: ICD-10-CM

## 2023-05-01 DIAGNOSIS — R19.7 DIARRHEA, UNSPECIFIED TYPE: ICD-10-CM

## 2023-05-01 LAB
ALBUMIN SERPL-MCNC: 4.2 G/DL (ref 3.5–5.2)
ALBUMIN/GLOB SERPL: 2 G/DL
ALP SERPL-CCNC: 45 U/L (ref 39–117)
ALT SERPL W P-5'-P-CCNC: 18 U/L (ref 1–41)
ANION GAP SERPL CALCULATED.3IONS-SCNC: 12 MMOL/L (ref 5–15)
AST SERPL-CCNC: 18 U/L (ref 1–40)
BASOPHILS # BLD AUTO: 0.06 10*3/MM3 (ref 0–0.2)
BASOPHILS NFR BLD AUTO: 0.8 % (ref 0–1.5)
BILIRUB SERPL-MCNC: 0.3 MG/DL (ref 0–1.2)
BUN SERPL-MCNC: 25 MG/DL (ref 8–23)
BUN/CREAT SERPL: 15.1 (ref 7–25)
CALCIUM SPEC-SCNC: 8.8 MG/DL (ref 8.6–10.5)
CHLORIDE SERPL-SCNC: 107 MMOL/L (ref 98–107)
CO2 SERPL-SCNC: 22 MMOL/L (ref 22–29)
CREAT SERPL-MCNC: 1.66 MG/DL (ref 0.76–1.27)
DEPRECATED RDW RBC AUTO: 40.8 FL (ref 37–54)
EGFRCR SERPLBLD CKD-EPI 2021: 42.7 ML/MIN/1.73
EOSINOPHIL # BLD AUTO: 0.25 10*3/MM3 (ref 0–0.4)
EOSINOPHIL NFR BLD AUTO: 3.1 % (ref 0.3–6.2)
ERYTHROCYTE [DISTWIDTH] IN BLOOD BY AUTOMATED COUNT: 12.5 % (ref 12.3–15.4)
FERRITIN SERPL-MCNC: 407 NG/ML (ref 30–400)
FOLATE SERPL-MCNC: >20 NG/ML (ref 4.78–24.2)
GLOBULIN UR ELPH-MCNC: 2.1 GM/DL
GLUCOSE SERPL-MCNC: 138 MG/DL (ref 65–99)
HCT VFR BLD AUTO: 35.8 % (ref 37.5–51)
HGB BLD-MCNC: 12.2 G/DL (ref 13–17.7)
IMM GRANULOCYTES # BLD AUTO: 0.02 10*3/MM3 (ref 0–0.05)
IMM GRANULOCYTES NFR BLD AUTO: 0.3 % (ref 0–0.5)
IRON 24H UR-MRATE: 103 MCG/DL (ref 59–158)
IRON SATN MFR SERPL: 31 % (ref 20–50)
LYMPHOCYTES # BLD AUTO: 1.97 10*3/MM3 (ref 0.7–3.1)
LYMPHOCYTES NFR BLD AUTO: 24.7 % (ref 19.6–45.3)
MCH RBC QN AUTO: 30.3 PG (ref 26.6–33)
MCHC RBC AUTO-ENTMCNC: 34.1 G/DL (ref 31.5–35.7)
MCV RBC AUTO: 89.1 FL (ref 79–97)
MONOCYTES # BLD AUTO: 0.49 10*3/MM3 (ref 0.1–0.9)
MONOCYTES NFR BLD AUTO: 6.1 % (ref 5–12)
NEUTROPHILS NFR BLD AUTO: 5.19 10*3/MM3 (ref 1.7–7)
NEUTROPHILS NFR BLD AUTO: 65 % (ref 42.7–76)
NRBC BLD AUTO-RTO: 0 /100 WBC (ref 0–0.2)
PLATELET # BLD AUTO: 182 10*3/MM3 (ref 140–450)
PMV BLD AUTO: 11.6 FL (ref 6–12)
POTASSIUM SERPL-SCNC: 5.1 MMOL/L (ref 3.5–5.2)
PROT SERPL-MCNC: 6.3 G/DL (ref 6–8.5)
RBC # BLD AUTO: 4.02 10*6/MM3 (ref 4.14–5.8)
SODIUM SERPL-SCNC: 141 MMOL/L (ref 136–145)
T4 FREE SERPL-MCNC: 1 NG/DL (ref 0.93–1.7)
TIBC SERPL-MCNC: 329 MCG/DL (ref 298–536)
TRANSFERRIN SERPL-MCNC: 221 MG/DL (ref 200–360)
TSH SERPL DL<=0.05 MIU/L-ACNC: 1.87 UIU/ML (ref 0.27–4.2)
VIT B12 BLD-MCNC: 1953 PG/ML (ref 211–946)
WBC NRBC COR # BLD: 7.98 10*3/MM3 (ref 3.4–10.8)

## 2023-05-01 PROCEDURE — 83540 ASSAY OF IRON: CPT

## 2023-05-01 PROCEDURE — 80053 COMPREHEN METABOLIC PANEL: CPT

## 2023-05-01 PROCEDURE — 85025 COMPLETE CBC W/AUTO DIFF WBC: CPT

## 2023-05-01 PROCEDURE — 84439 ASSAY OF FREE THYROXINE: CPT

## 2023-05-01 PROCEDURE — 82728 ASSAY OF FERRITIN: CPT

## 2023-05-01 PROCEDURE — 36415 COLL VENOUS BLD VENIPUNCTURE: CPT

## 2023-05-01 PROCEDURE — 84466 ASSAY OF TRANSFERRIN: CPT

## 2023-05-01 PROCEDURE — 82607 VITAMIN B-12: CPT

## 2023-05-01 PROCEDURE — 82746 ASSAY OF FOLIC ACID SERUM: CPT

## 2023-05-01 PROCEDURE — 84443 ASSAY THYROID STIM HORMONE: CPT

## 2023-05-01 RX ORDER — FUROSEMIDE 20 MG/1
10 TABLET ORAL DAILY
Qty: 45 TABLET | Refills: 1 | Status: SHIPPED | OUTPATIENT
Start: 2023-05-01

## 2023-05-01 RX ORDER — LANOLIN ALCOHOL/MO/W.PET/CERES
1000 CREAM (GRAM) TOPICAL DAILY
Qty: 90 TABLET | Refills: 1 | Status: SHIPPED | OUTPATIENT
Start: 2023-05-01

## 2023-05-01 NOTE — ASSESSMENT & PLAN NOTE
His hemoglobin is stable around 12 as of his 5/23 urgent visit for this.  Other studies are pending, to include B12, liver studies, TSH, etc. These studies have been within normal limits when checked here recently, it has been 10 years since he has had any type of study on his heart, he has numerous cardiac risk factors obviously, so we will get stress imaging studies and make further recommendations at that time.

## 2023-05-01 NOTE — PATIENT INSTRUCTIONS
We will call after I see the rest of the labs tomorrow if there is any significant findings in regards to his iron/thyroid/etc.    2.  I put orders in to get stool tests x3 in regards to looking for traces of blood.  If positive, this should help get the colonoscopy ordered sooner.    3.  You will hear from the cardiology lab in regards to setting up his stress test.  Just remember to not take the carvedilol/Coreg the morning of the exam.

## 2023-05-01 NOTE — ASSESSMENT & PLAN NOTE
Patient with concern for possible early ulcer as of his 5/23 office visit.  He has had some melanotic sounding stools, studies are pending.  He is also having some nosebleeds.  We will stop his Flonase first, if that does not help, he will lower the baby aspirin to every other day.  He had a CBC the day of this visit, platelets are fine.

## 2023-05-01 NOTE — ASSESSMENT & PLAN NOTE
Blood pressure fairly stable as of 5/23, continue full dose candesartan/doxazosin and moderate dose nifedipine/carvedilol.

## 2023-05-01 NOTE — ASSESSMENT & PLAN NOTE
Patient did not tolerate every other day dosing of the Lasix as discussed below, so he is resumed daily dosing.

## 2023-05-03 ENCOUNTER — LAB (OUTPATIENT)
Dept: LAB | Facility: HOSPITAL | Age: 78
End: 2023-05-03
Payer: MEDICARE

## 2023-05-03 DIAGNOSIS — K92.1 MELANOTIC STOOLS: ICD-10-CM

## 2023-05-03 LAB — HEMOCCULT STL QL IA: NEGATIVE

## 2023-05-03 PROCEDURE — 82274 ASSAY TEST FOR BLOOD FECAL: CPT

## 2023-05-09 ENCOUNTER — PREP FOR SURGERY (OUTPATIENT)
Dept: OTHER | Facility: HOSPITAL | Age: 78
End: 2023-05-09
Payer: MEDICARE

## 2023-05-09 ENCOUNTER — OFFICE VISIT (OUTPATIENT)
Dept: SURGERY | Facility: CLINIC | Age: 78
End: 2023-05-09
Payer: MEDICARE

## 2023-05-09 VITALS — BODY MASS INDEX: 31.97 KG/M2 | RESPIRATION RATE: 16 BRPM | HEIGHT: 70 IN

## 2023-05-09 DIAGNOSIS — Z87.11 HISTORY OF BLEEDING PEPTIC ULCER: ICD-10-CM

## 2023-05-09 DIAGNOSIS — R19.7 DIARRHEA, UNSPECIFIED TYPE: Primary | ICD-10-CM

## 2023-05-09 DIAGNOSIS — K92.1 BLOOD IN STOOL: ICD-10-CM

## 2023-05-09 NOTE — H&P (VIEW-ONLY)
Chief Complaint:  Colonoscopy (CONSULT/IRON DEFICIENCY/MELANOTIC STOOLS/DIARRHEA )    Primary Care Provider: Anuel Fisher MD    Referring Provider: Anuel Fisher MD    History of Present Illness  Telly Fitch is a 77 y.o. male referred by Anuel Fisher MD for endoscopic evaluation.  Patient last had a colonoscopy about 5 years ago.  He has been noticing occasional small amount of blood in his stool.  Also, he is having diarrhea.  The diarrhea occurs shortly after just about anything he eats.  Hemoglobin was 11.7 on 4/10/2023 and 12.2 on 5/1/2023.  Patient says he had an EGD about 10 years ago and had a bleeding gastric ulcer.  He takes Prilosec daily.  He is not having any abdominal pain.  He feels fatigued easily.  No nausea or vomiting.    Allergies: Contrast dye (echo or unknown ct/mr), Lactose intolerance (gi), and Latex    Outpatient Medications Marked as Taking for the 5/9/23 encounter (Office Visit) with Delroy Campos MD   Medication Sig Dispense Refill   • albuterol sulfate  (90 Base) MCG/ACT inhaler As Needed.     • ascorbic acid (VITAMIN C) 1000 MG tablet 1 tablet 2 (Two) Times a Day.     • aspirin 81 MG chewable tablet aspirin 81 mg oral tablet,chewable chew 1 tablet (81 mg) by oral route once daily   Suspended     • candesartan (ATACAND) 32 MG tablet Take 1 tablet by mouth Every Night for 90 days. 90 tablet 1   • carvedilol (COREG) 12.5 MG tablet Take 1 tablet by mouth 2 (Two) Times a Day. 180 tablet 1   • dicyclomine (BENTYL) 20 MG tablet As Needed.     • doxazosin (CARDURA) 2 MG tablet Take 4 tablets by mouth Daily for 90 days. 360 tablet 3   • ergocalciferol (ERGOCALCIFEROL) 1.25 MG (55748 UT) capsule Take 1 capsule by mouth.     • erythromycin (ROMYCIN) 5 MG/GM ophthalmic ointment Administer  to the right eye 3 (Three) Times a Day.     • finasteride (PROSCAR) 5 MG tablet Take 1 tablet by mouth Daily. 90 tablet 1   • fluticasone-salmeterol (ADVAIR) 250-50 MCG/DOSE DISKUS Inhale 1  puff 2 (Two) Times a Day.     • furosemide (LASIX) 20 MG tablet Take 0.5 tablets by mouth Daily. 45 tablet 1   • metFORMIN (GLUCOPHAGE) 500 MG tablet Take 1 tablet by mouth 2 (Two) Times a Day. 180 tablet 1   • montelukast (SINGULAIR) 10 MG tablet Take 1 tablet by mouth Every Night. 90 tablet 3   • multivitamin (THERAGRAN) tablet tablet Take  by mouth Daily.     • NIFEdipine XL (PROCARDIA XL) 90 MG 24 hr tablet Take 1 tablet by mouth Daily. 90 tablet 1   • nitroglycerin (Nitrostat) 0.4 MG SL tablet Place 1 tablet under the tongue Every 5 (Five) Minutes As Needed for Chest Pain. Take no more than 3 doses in 15 minutes. 1 tablet 1   • omeprazole (PrilOSEC) 20 MG capsule Take 1 capsule by mouth Daily. 90 capsule 3   • pravastatin (PRAVACHOL) 20 MG tablet Take 1 tablet by mouth Every Night. 90 tablet 1   • Red Yeast Rice 600 MG capsule Take 1 capsule by mouth Daily. 90 each 1   • sodium bicarbonate 650 MG tablet Take 1 tablet by mouth 3 (Three) Times a Day.     • tolterodine LA (DETROL LA) 2 MG 24 hr capsule Take 1 capsule by mouth Daily. 90 capsule 3   • vitamin B-12 (CYANOCOBALAMIN) 1000 MCG tablet Take 1 tablet by mouth Daily. 90 tablet 1   • vitamin E 400 UNIT capsule Take 1 capsule by mouth Daily.     • Zinc 50 MG tablet Take  by mouth Daily.         Past Medical History:   • Acute peptic ulcer, site unspecified, without hemorrhage or perforation   • BPH (benign prostatic hyperplasia)   • CAD (coronary artery disease)   • Chronic kidney disease, stage 3   • DJD (degenerative joint disease)   • Enlarged prostate without lower urinary tract symptoms (luts)   • Essential (primary) hypertension   • Hypercholesteremia   • Intervertebral disc disorders with radiculopathy, lumbar region   • Iron deficiency anemia, unspecified   • Mixed hyperlipidemia   • Morbid (severe) obesity due to excess calories   • Nephrolithiasis   • Nicotine dependence, unspecified, uncomplicated   • RUPESH (obstructive sleep apnea)   • RAD (reactive  "airway disease)   • Sleep apnea, unspecified   • Type 2 diabetes mellitus with other diabetic kidney complication    WITH CHRONIC KIDNEY DISEASE         Past Surgical History:   • BASAL CELL CARCINOMA EXCISION    REMOVED FROM NOSE   • CATARACT EXTRACTION, BILATERAL   • COLONOSCOPY   • DENTAL PROCEDURE   • KIDNEY STONE SURGERY    MARCH AND APRIL/ REMOVAL (JULY/AUG)   • OTHER SURGICAL HISTORY    BLEEDING ULCER   • SKIN CANCER DESTRUCTION    CANCER REMOVED FROM RIGHT TEMPLE   • UMBILICAL HERNIA REPAIR    R/L       Family History: No family history on file.     Social History:  Social History     Tobacco Use   • Smoking status: Never   • Smokeless tobacco: Never   Substance Use Topics   • Alcohol use: Never       Objective     Vital Signs:  Resp 16   Ht 177.8 cm (70\")   BMI 31.97 kg/m²   • Constitutional: wife present, alert, no acute distress, reliable historian  • HENT:  NCAT, no visible deformities or lesions  • Eyes:  sclerae clear, conjunctivae clear, EOMI  • Neck:  normal appearance, no masses, trachea midline  • Respiratory:  breathing not labored, respiratory effort appears normal  • Cardiovascular:  heart regular rate  • Abdomen:  nondistended    • Skin and subcutaneous tissue:  no visible concerning rashes or lesions, no jaundice, tattoos  • Musculoskeletal: moving all extremities symmetrically and purposefully  • Neurologic:  no obvious motor or sensory deficits, normal gait, able to stand without difficulty, cerebellar function without any obvious abnormalities, alert & oriented x 3, speech clear  • Psychiatric:  judgment and insight intact, mood normal, affect appropriate, cooperative      Assessment:  Diagnoses and all orders for this visit:    1. Diarrhea, unspecified type (Primary)    2. Blood in stool    3. History of bleeding peptic ulcer        Plan:  Esophagogastroduodenoscopy and colonoscopy    Discussion: Indications, options, risks, benefits, and expected outcomes of planned surgery were " discussed with the patient and he agrees to proceed.    Delroy Campos MD  05/09/2023    Electronically signed by Delroy Campos MD, 05/09/23, 10:52 AM EDT.

## 2023-05-09 NOTE — PROGRESS NOTES
Chief Complaint:  Colonoscopy (CONSULT/IRON DEFICIENCY/MELANOTIC STOOLS/DIARRHEA )    Primary Care Provider: Anuel Fisher MD    Referring Provider: Anuel Fisher MD    History of Present Illness  Telly Fitch is a 77 y.o. male referred by Anuel Fisher MD for endoscopic evaluation.  Patient last had a colonoscopy about 5 years ago.  He has been noticing occasional small amount of blood in his stool.  Also, he is having diarrhea.  The diarrhea occurs shortly after just about anything he eats.  Hemoglobin was 11.7 on 4/10/2023 and 12.2 on 5/1/2023.  Patient says he had an EGD about 10 years ago and had a bleeding gastric ulcer.  He takes Prilosec daily.  He is not having any abdominal pain.  He feels fatigued easily.  No nausea or vomiting.    Allergies: Contrast dye (echo or unknown ct/mr), Lactose intolerance (gi), and Latex    Outpatient Medications Marked as Taking for the 5/9/23 encounter (Office Visit) with Delroy Campos MD   Medication Sig Dispense Refill   • albuterol sulfate  (90 Base) MCG/ACT inhaler As Needed.     • ascorbic acid (VITAMIN C) 1000 MG tablet 1 tablet 2 (Two) Times a Day.     • aspirin 81 MG chewable tablet aspirin 81 mg oral tablet,chewable chew 1 tablet (81 mg) by oral route once daily   Suspended     • candesartan (ATACAND) 32 MG tablet Take 1 tablet by mouth Every Night for 90 days. 90 tablet 1   • carvedilol (COREG) 12.5 MG tablet Take 1 tablet by mouth 2 (Two) Times a Day. 180 tablet 1   • dicyclomine (BENTYL) 20 MG tablet As Needed.     • doxazosin (CARDURA) 2 MG tablet Take 4 tablets by mouth Daily for 90 days. 360 tablet 3   • ergocalciferol (ERGOCALCIFEROL) 1.25 MG (66577 UT) capsule Take 1 capsule by mouth.     • erythromycin (ROMYCIN) 5 MG/GM ophthalmic ointment Administer  to the right eye 3 (Three) Times a Day.     • finasteride (PROSCAR) 5 MG tablet Take 1 tablet by mouth Daily. 90 tablet 1   • fluticasone-salmeterol (ADVAIR) 250-50 MCG/DOSE DISKUS Inhale 1  puff 2 (Two) Times a Day.     • furosemide (LASIX) 20 MG tablet Take 0.5 tablets by mouth Daily. 45 tablet 1   • metFORMIN (GLUCOPHAGE) 500 MG tablet Take 1 tablet by mouth 2 (Two) Times a Day. 180 tablet 1   • montelukast (SINGULAIR) 10 MG tablet Take 1 tablet by mouth Every Night. 90 tablet 3   • multivitamin (THERAGRAN) tablet tablet Take  by mouth Daily.     • NIFEdipine XL (PROCARDIA XL) 90 MG 24 hr tablet Take 1 tablet by mouth Daily. 90 tablet 1   • nitroglycerin (Nitrostat) 0.4 MG SL tablet Place 1 tablet under the tongue Every 5 (Five) Minutes As Needed for Chest Pain. Take no more than 3 doses in 15 minutes. 1 tablet 1   • omeprazole (PrilOSEC) 20 MG capsule Take 1 capsule by mouth Daily. 90 capsule 3   • pravastatin (PRAVACHOL) 20 MG tablet Take 1 tablet by mouth Every Night. 90 tablet 1   • Red Yeast Rice 600 MG capsule Take 1 capsule by mouth Daily. 90 each 1   • sodium bicarbonate 650 MG tablet Take 1 tablet by mouth 3 (Three) Times a Day.     • tolterodine LA (DETROL LA) 2 MG 24 hr capsule Take 1 capsule by mouth Daily. 90 capsule 3   • vitamin B-12 (CYANOCOBALAMIN) 1000 MCG tablet Take 1 tablet by mouth Daily. 90 tablet 1   • vitamin E 400 UNIT capsule Take 1 capsule by mouth Daily.     • Zinc 50 MG tablet Take  by mouth Daily.         Past Medical History:   • Acute peptic ulcer, site unspecified, without hemorrhage or perforation   • BPH (benign prostatic hyperplasia)   • CAD (coronary artery disease)   • Chronic kidney disease, stage 3   • DJD (degenerative joint disease)   • Enlarged prostate without lower urinary tract symptoms (luts)   • Essential (primary) hypertension   • Hypercholesteremia   • Intervertebral disc disorders with radiculopathy, lumbar region   • Iron deficiency anemia, unspecified   • Mixed hyperlipidemia   • Morbid (severe) obesity due to excess calories   • Nephrolithiasis   • Nicotine dependence, unspecified, uncomplicated   • RUPESH (obstructive sleep apnea)   • RAD (reactive  "airway disease)   • Sleep apnea, unspecified   • Type 2 diabetes mellitus with other diabetic kidney complication    WITH CHRONIC KIDNEY DISEASE         Past Surgical History:   • BASAL CELL CARCINOMA EXCISION    REMOVED FROM NOSE   • CATARACT EXTRACTION, BILATERAL   • COLONOSCOPY   • DENTAL PROCEDURE   • KIDNEY STONE SURGERY    MARCH AND APRIL/ REMOVAL (JULY/AUG)   • OTHER SURGICAL HISTORY    BLEEDING ULCER   • SKIN CANCER DESTRUCTION    CANCER REMOVED FROM RIGHT TEMPLE   • UMBILICAL HERNIA REPAIR    R/L       Family History: No family history on file.     Social History:  Social History     Tobacco Use   • Smoking status: Never   • Smokeless tobacco: Never   Substance Use Topics   • Alcohol use: Never       Objective     Vital Signs:  Resp 16   Ht 177.8 cm (70\")   BMI 31.97 kg/m²   • Constitutional: wife present, alert, no acute distress, reliable historian  • HENT:  NCAT, no visible deformities or lesions  • Eyes:  sclerae clear, conjunctivae clear, EOMI  • Neck:  normal appearance, no masses, trachea midline  • Respiratory:  breathing not labored, respiratory effort appears normal  • Cardiovascular:  heart regular rate  • Abdomen:  nondistended    • Skin and subcutaneous tissue:  no visible concerning rashes or lesions, no jaundice, tattoos  • Musculoskeletal: moving all extremities symmetrically and purposefully  • Neurologic:  no obvious motor or sensory deficits, normal gait, able to stand without difficulty, cerebellar function without any obvious abnormalities, alert & oriented x 3, speech clear  • Psychiatric:  judgment and insight intact, mood normal, affect appropriate, cooperative      Assessment:  Diagnoses and all orders for this visit:    1. Diarrhea, unspecified type (Primary)    2. Blood in stool    3. History of bleeding peptic ulcer        Plan:  Esophagogastroduodenoscopy and colonoscopy    Discussion: Indications, options, risks, benefits, and expected outcomes of planned surgery were " discussed with the patient and he agrees to proceed.    Delroy Campos MD  05/09/2023    Electronically signed by Delroy Campos MD, 05/09/23, 10:52 AM EDT.

## 2023-05-25 DIAGNOSIS — K21.9 MILD ACID REFLUX: ICD-10-CM

## 2023-05-25 RX ORDER — OMEPRAZOLE 20 MG/1
20 CAPSULE, DELAYED RELEASE ORAL DAILY
Qty: 90 CAPSULE | Refills: 3 | Status: SHIPPED | OUTPATIENT
Start: 2023-05-25

## 2023-05-25 NOTE — TELEPHONE ENCOUNTER
Rx Refill Note  Requested Prescriptions      No prescriptions requested or ordered in this encounter      Last office visit with prescribing clinician: 5/1/2023   Last telemedicine visit with prescribing clinician: Visit date not found   Next office visit with prescribing clinician: 8/22/2023                         Would you like a call back once the refill request has been completed: [] Yes [] No    If the office needs to give you a call back, can they leave a voicemail: [] Yes [] No    Helen Poon MA  05/25/23, 11:05 EDT

## 2023-06-06 ENCOUNTER — HOSPITAL ENCOUNTER (OUTPATIENT)
Facility: HOSPITAL | Age: 78
Setting detail: HOSPITAL OUTPATIENT SURGERY
Discharge: HOME | End: 2023-06-06
Attending: SURGERY | Admitting: SURGERY
Payer: MEDICARE

## 2023-06-06 ENCOUNTER — ANESTHESIA (OUTPATIENT)
Dept: GASTROENTEROLOGY | Facility: HOSPITAL | Age: 78
End: 2023-06-06
Payer: MEDICARE

## 2023-06-06 ENCOUNTER — ANESTHESIA EVENT (OUTPATIENT)
Dept: GASTROENTEROLOGY | Facility: HOSPITAL | Age: 78
End: 2023-06-06
Payer: MEDICARE

## 2023-06-06 VITALS
DIASTOLIC BLOOD PRESSURE: 82 MMHG | BODY MASS INDEX: 29.86 KG/M2 | OXYGEN SATURATION: 90 % | HEART RATE: 62 BPM | RESPIRATION RATE: 19 BRPM | WEIGHT: 208.11 LBS | TEMPERATURE: 98.8 F | SYSTOLIC BLOOD PRESSURE: 144 MMHG

## 2023-06-06 DIAGNOSIS — R19.7 DIARRHEA, UNSPECIFIED TYPE: ICD-10-CM

## 2023-06-06 DIAGNOSIS — K92.1 BLOOD IN STOOL: ICD-10-CM

## 2023-06-06 DIAGNOSIS — Z87.11 HISTORY OF BLEEDING PEPTIC ULCER: ICD-10-CM

## 2023-06-06 DIAGNOSIS — J30.1 SEASONAL ALLERGIC RHINITIS DUE TO POLLEN: ICD-10-CM

## 2023-06-06 LAB
027 TOXIN: NORMAL
C DIFF TOX GENS STL QL NAA+PROBE: NEGATIVE
GLUCOSE BLDC GLUCOMTR-MCNC: 118 MG/DL (ref 70–99)

## 2023-06-06 PROCEDURE — 87493 C DIFF AMPLIFIED PROBE: CPT | Performed by: SURGERY

## 2023-06-06 PROCEDURE — 25010000002 PROPOFOL 10 MG/ML EMULSION: Performed by: NURSE ANESTHETIST, CERTIFIED REGISTERED

## 2023-06-06 PROCEDURE — 88342 IMHCHEM/IMCYTCHM 1ST ANTB: CPT | Performed by: SURGERY

## 2023-06-06 PROCEDURE — 88305 TISSUE EXAM BY PATHOLOGIST: CPT | Performed by: SURGERY

## 2023-06-06 PROCEDURE — 82948 REAGENT STRIP/BLOOD GLUCOSE: CPT

## 2023-06-06 PROCEDURE — 87505 NFCT AGENT DETECTION GI: CPT | Performed by: SURGERY

## 2023-06-06 RX ORDER — PROPOFOL 10 MG/ML
VIAL (ML) INTRAVENOUS AS NEEDED
Status: DISCONTINUED | OUTPATIENT
Start: 2023-06-06 | End: 2023-06-06 | Stop reason: SURG

## 2023-06-06 RX ORDER — LORATADINE 10 MG/1
10 TABLET ORAL DAILY
Qty: 90 TABLET | Refills: 1 | Status: SHIPPED | OUTPATIENT
Start: 2023-06-06 | End: 2023-09-04

## 2023-06-06 RX ORDER — SODIUM CHLORIDE, SODIUM LACTATE, POTASSIUM CHLORIDE, CALCIUM CHLORIDE 600; 310; 30; 20 MG/100ML; MG/100ML; MG/100ML; MG/100ML
30 INJECTION, SOLUTION INTRAVENOUS CONTINUOUS
Status: DISCONTINUED | OUTPATIENT
Start: 2023-06-06 | End: 2023-06-06 | Stop reason: HOSPADM

## 2023-06-06 RX ORDER — LIDOCAINE HYDROCHLORIDE 20 MG/ML
INJECTION, SOLUTION EPIDURAL; INFILTRATION; INTRACAUDAL; PERINEURAL AS NEEDED
Status: DISCONTINUED | OUTPATIENT
Start: 2023-06-06 | End: 2023-06-06 | Stop reason: SURG

## 2023-06-06 RX ADMIN — PROPOFOL 150 MCG/KG/MIN: 10 INJECTION, EMULSION INTRAVENOUS at 13:13

## 2023-06-06 RX ADMIN — PROPOFOL 50 MG: 10 INJECTION, EMULSION INTRAVENOUS at 13:22

## 2023-06-06 RX ADMIN — PROPOFOL 100 MG: 10 INJECTION, EMULSION INTRAVENOUS at 13:11

## 2023-06-06 RX ADMIN — SODIUM CHLORIDE, POTASSIUM CHLORIDE, SODIUM LACTATE AND CALCIUM CHLORIDE 30 ML/HR: 600; 310; 30; 20 INJECTION, SOLUTION INTRAVENOUS at 12:05

## 2023-06-06 RX ADMIN — PROPOFOL 20 MG: 10 INJECTION, EMULSION INTRAVENOUS at 13:37

## 2023-06-06 RX ADMIN — PROPOFOL 30 MG: 10 INJECTION, EMULSION INTRAVENOUS at 13:34

## 2023-06-06 RX ADMIN — LIDOCAINE HYDROCHLORIDE 80 MG: 20 INJECTION, SOLUTION EPIDURAL; INFILTRATION; INTRACAUDAL; PERINEURAL at 13:11

## 2023-06-06 RX ADMIN — PROPOFOL 30 MG: 10 INJECTION, EMULSION INTRAVENOUS at 13:32

## 2023-06-06 NOTE — ANESTHESIA POSTPROCEDURE EVALUATION
Patient: Telly Fitch    Procedure Summary       Date: 06/06/23 Room / Location: [facility] ENDOSCOPY 3 / [facility] ENDOSCOPY    Anesthesia Start: 1310 Anesthesia Stop: 1341    Procedures:       ESOPHAGOGASTRODUODENOSCOPY WITH BIOPSIES      COLONOSCOPY WITH STOOL CULTURE AND POLYPECTOMIES Diagnosis:       Diarrhea, unspecified type      Blood in stool      History of bleeding peptic ulcer      (Diarrhea, unspecified type [R19.7])      (Blood in stool [K92.1])      (History of bleeding peptic ulcer [Z87.11])    Surgeons: Delroy Campos MD Provider: Audi Buckley CRNA    Anesthesia Type: general ASA Status: 4            Anesthesia Type: general    Vitals  Vitals Value Taken Time   /72 06/06/23 1345   Temp 36.6 °C (97.8 °F) 06/06/23 1344   Pulse 64 06/06/23 1348   Resp 22 06/06/23 1344   SpO2 94 % 06/06/23 1348   Vitals shown include unvalidated device data.        Post Anesthesia Care and Evaluation    Patient location during evaluation: bedside  Patient participation: complete - patient participated  Level of consciousness: awake  Pain management: adequate    Airway patency: patent  PONV Status: none  Cardiovascular status: acceptable and stable  Respiratory status: acceptable  Hydration status: acceptable    Comments: An Anesthesiologist personally participated in the most demanding procedures (including induction and emergence if applicable) in the anesthesia plan, monitored the course of anesthesia administration at frequent intervals and remained physically present and available for immediate diagnosis and treatment of emergencies.

## 2023-06-06 NOTE — ANESTHESIA PREPROCEDURE EVALUATION
Anesthesia Evaluation     Patient summary reviewed and Nursing notes reviewed   no history of anesthetic complications:   NPO Solid Status: > 8 hours  NPO Liquid Status: > 2 hours           Airway   Mallampati: II  TM distance: >3 FB  Neck ROM: full  No difficulty expected  Dental          Pulmonary - normal exam    breath sounds clear to auscultation  (+) asthma,sleep apnea  Cardiovascular - normal exam  Exercise tolerance: good (4-7 METS)    Rhythm: regular  Rate: normal    (+) hypertension, CAD      Neuro/Psych- negative ROS  GI/Hepatic/Renal/Endo    (+) PUD, renal disease CRI, diabetes mellitus    Musculoskeletal (-) negative ROS    Abdominal    Substance History - negative use     OB/GYN negative ob/gyn ROS         Other - negative ROS       ROS/Med Hx Other: PAT Nursing Notes unavailable.                   Anesthesia Plan    ASA 4     general       Anesthetic plan, risks, benefits, and alternatives have been provided, discussed and informed consent has been obtained with: patient and spouse/significant other.      CODE STATUS:

## 2023-06-07 LAB
C COLI+JEJ+UPSA DNA STL QL NAA+NON-PROBE: NOT DETECTED
EC STX1+STX2 GENES STL QL NAA+NON-PROBE: NOT DETECTED
S ENT+BONG DNA STL QL NAA+NON-PROBE: NOT DETECTED
SHIGELLA SP+EIEC IPAH ST NAA+NON-PROBE: NOT DETECTED

## 2023-06-08 DIAGNOSIS — J30.1 SEASONAL ALLERGIC RHINITIS DUE TO POLLEN: ICD-10-CM

## 2023-06-08 LAB
CYTO UR: NORMAL
LAB AP CASE REPORT: NORMAL
LAB AP CLINICAL INFORMATION: NORMAL
LAB AP SPECIAL STAINS: NORMAL
PATH REPORT.FINAL DX SPEC: NORMAL
PATH REPORT.GROSS SPEC: NORMAL

## 2023-06-08 RX ORDER — MONTELUKAST SODIUM 10 MG/1
10 TABLET ORAL NIGHTLY
Qty: 90 TABLET | Refills: 3 | Status: SHIPPED | OUTPATIENT
Start: 2023-06-08

## 2023-08-01 RX ORDER — NIFEDIPINE 90 MG/1
90 TABLET, EXTENDED RELEASE ORAL DAILY
Qty: 90 TABLET | Refills: 1 | Status: SHIPPED | OUTPATIENT
Start: 2023-08-01

## 2023-08-14 ENCOUNTER — LAB (OUTPATIENT)
Dept: LAB | Facility: HOSPITAL | Age: 78
End: 2023-08-14
Payer: MEDICARE

## 2023-08-14 DIAGNOSIS — E55.9 VITAMIN D DEFICIENCY: ICD-10-CM

## 2023-08-14 DIAGNOSIS — Z00.00 MEDICARE ANNUAL WELLNESS VISIT, SUBSEQUENT: ICD-10-CM

## 2023-08-14 DIAGNOSIS — E11.9 TYPE 2 DIABETES MELLITUS WITHOUT COMPLICATION, WITHOUT LONG-TERM CURRENT USE OF INSULIN: ICD-10-CM

## 2023-08-14 LAB
25(OH)D3 SERPL-MCNC: 34 NG/ML (ref 30–100)
HBA1C MFR BLD: 7.1 % (ref 4.8–5.6)
TSH SERPL DL<=0.05 MIU/L-ACNC: 2.61 UIU/ML (ref 0.27–4.2)

## 2023-08-14 PROCEDURE — 36415 COLL VENOUS BLD VENIPUNCTURE: CPT

## 2023-08-14 PROCEDURE — 83036 HEMOGLOBIN GLYCOSYLATED A1C: CPT

## 2023-08-14 PROCEDURE — 82306 VITAMIN D 25 HYDROXY: CPT

## 2023-08-14 PROCEDURE — 84443 ASSAY THYROID STIM HORMONE: CPT

## 2023-08-22 ENCOUNTER — OFFICE VISIT (OUTPATIENT)
Dept: INTERNAL MEDICINE | Facility: CLINIC | Age: 78
End: 2023-08-22
Payer: MEDICARE

## 2023-08-22 VITALS
TEMPERATURE: 98.2 F | SYSTOLIC BLOOD PRESSURE: 150 MMHG | BODY MASS INDEX: 30.46 KG/M2 | WEIGHT: 212.8 LBS | HEIGHT: 70 IN | DIASTOLIC BLOOD PRESSURE: 80 MMHG | HEART RATE: 56 BPM | OXYGEN SATURATION: 96 %

## 2023-08-22 DIAGNOSIS — N18.32 STAGE 3B CHRONIC KIDNEY DISEASE: Primary | ICD-10-CM

## 2023-08-22 DIAGNOSIS — E11.9 TYPE 2 DIABETES MELLITUS WITHOUT COMPLICATION, WITHOUT LONG-TERM CURRENT USE OF INSULIN: ICD-10-CM

## 2023-08-22 DIAGNOSIS — E87.5 HYPERKALEMIA: ICD-10-CM

## 2023-08-22 DIAGNOSIS — E55.9 VITAMIN D DEFICIENCY: ICD-10-CM

## 2023-08-22 DIAGNOSIS — I10 BENIGN ESSENTIAL HTN: ICD-10-CM

## 2023-08-22 PROCEDURE — 3051F HG A1C>EQUAL 7.0%<8.0%: CPT | Performed by: INTERNAL MEDICINE

## 2023-08-22 PROCEDURE — 3079F DIAST BP 80-89 MM HG: CPT | Performed by: INTERNAL MEDICINE

## 2023-08-22 PROCEDURE — 1159F MED LIST DOCD IN RCRD: CPT | Performed by: INTERNAL MEDICINE

## 2023-08-22 PROCEDURE — 99214 OFFICE O/P EST MOD 30 MIN: CPT | Performed by: INTERNAL MEDICINE

## 2023-08-22 PROCEDURE — 90677 PCV20 VACCINE IM: CPT | Performed by: INTERNAL MEDICINE

## 2023-08-22 PROCEDURE — G0009 ADMIN PNEUMOCOCCAL VACCINE: HCPCS | Performed by: INTERNAL MEDICINE

## 2023-08-22 PROCEDURE — 1160F RVW MEDS BY RX/DR IN RCRD: CPT | Performed by: INTERNAL MEDICINE

## 2023-08-22 PROCEDURE — 3077F SYST BP >= 140 MM HG: CPT | Performed by: INTERNAL MEDICINE

## 2023-08-22 NOTE — PROGRESS NOTES
"Chief Complaint  Vitamin D Deficiency and Follow-up (Pt states that this is routine, he had labs and he has no  new issues. )    Subjective          Telly Fitch presents to [facility]     History of Present Illness  Patient pleasant 77-year-old male with underlying hypertension, hyperlipidemia, chronic kidney disease stage 3a, as well as diabetes mellitus, who is coming in 8/23 for routine 4-month follow-up.  We will review his labs, address any new concerns, and make new recommendations at that time.    Review of Systems   Constitutional:  Negative for appetite change, fatigue and fever.   HENT:  Negative for congestion and ear pain.    Eyes:  Negative for blurred vision.   Respiratory:  Negative for cough, chest tightness, shortness of breath and wheezing.    Cardiovascular:  Negative for chest pain, palpitations and leg swelling.   Gastrointestinal:  Negative for abdominal pain.   Genitourinary:  Negative for difficulty urinating, dysuria and hematuria.   Musculoskeletal:  Negative for arthralgias and gait problem.   Skin:  Negative for skin lesions.   Neurological:  Negative for syncope, memory problem and confusion.   Psychiatric/Behavioral:  Negative for self-injury and depressed mood.      Objective   Vital Signs:   /80   Pulse 56   Temp 98.2 øF (36.8 øC) (Skin)   Ht 177.8 cm (70\")   Wt 96.5 kg (212 lb 12.8 oz)   SpO2 96%   BMI 30.53 kg/mý           Physical Exam  Vitals and nursing note reviewed.   Constitutional:       General: He is not in acute distress.     Appearance: Normal appearance. He is not toxic-appearing.   HENT:      Head: Atraumatic.      Right Ear: External ear normal.      Left Ear: External ear normal.      Nose: Nose normal.      Mouth/Throat:      Mouth: Mucous membranes are moist.   Eyes:      General:         Right eye: No discharge.         Left eye: No discharge.      Extraocular Movements: Extraocular movements intact.      " Pupils: Pupils are equal, round, and reactive to light.   Cardiovascular:      Rate and Rhythm: Normal rate and regular rhythm.      Pulses: Normal pulses.      Heart sounds: Normal heart sounds. No murmur heard.    No gallop.   Pulmonary:      Effort: Pulmonary effort is normal. No respiratory distress.      Breath sounds: No wheezing, rhonchi or rales.   Abdominal:      General: There is no distension.      Palpations: Abdomen is soft. There is no mass.      Tenderness: There is no abdominal tenderness. There is no guarding.   Musculoskeletal:         General: No swelling or tenderness.      Cervical back: No tenderness.      Right lower leg: No edema.      Left lower leg: No edema.   Skin:     General: Skin is warm and dry.      Findings: No rash.   Neurological:      General: No focal deficit present.      Mental Status: He is alert and oriented to person, place, and time. Mental status is at baseline.      Motor: No weakness.      Gait: Gait normal.   Psychiatric:         Mood and Affect: Mood normal.         Thought Content: Thought content normal.        Result Review :   The following data was reviewed by: Anuel Fisher MD on 09/16/2021:                  Assessment and Plan    Diagnoses and all orders for this visit:    1. Stage 3b chronic kidney disease (Primary)  Assessment & Plan:  GFR stable recently at 43.  Has follow-up scheduled with nephrology in about 6 weeks as of his 8/23 OV.    Orders:  -     CBC & Differential; Future    2. Type 2 diabetes mellitus without complication, without long-term current use of insulin  Assessment & Plan:  A1c is stable at 7.1 as of his 8/23 office visit.  This is just on low-dose metformin, so certainly appropriate to continue same.  Deferring SGLT2 inhibitor to nephrology.    Orders:  -     Hemoglobin A1c; Future    3. Hyperkalemia  Assessment & Plan:  Has follow-up scheduled with nephrology in the near future.      4. Benign essential HTN  Assessment & Plan:  Blood  pressure stable as of his 8/23 office visit.  He can continue with full doses of candesartan and doxazosin, as well as moderate doses of nifedipine and carvedilol.    Orders:  -     Basic Metabolic Panel; Future    5. Vitamin D deficiency  Assessment & Plan:  Vitamin D is up from 19 to 34 as of his 8/23 office visit.  He is on once weekly prescription dosing and is stable to continue same.      Other orders  -     Pneumococcal Conjugate Vaccine 20-Valent (PCV20)         --  --  OLDER NOTES:  VISIT 5/21:  --  HTN typically controlled and please check at home...HCTZ added since last OV; currently high in AM, ? related to CPAP he quit using; will change flomax to cardura and titrate; if no benefit, then will need to get back on CPAP...try 12.5 HCTZ given urinary c/o...seeing Dr Barrera and I rec 24-hr BP monitor and then review results with him on RTO ( ? related to not being on CPAP)...better 8/18, but will try off HCTZ given incont and increase cardura...has not needed PRN clonidine given at 1/19 urgent visit; stable 2/19...up 3/20 and will see if RYR helps=wife thinks it will...145/70 at home=bring wrist cuff on RTO...his cuff reads about 10 pts high top/bottom, but his numbers are up more at home as well; will max out cardura 9/20---> wel controlled 5/21.  CKD3a stable=1.6 (42%) and d/w no nsaids...stable 10/16 = 44%...54% nice...42% ? realted to recent stone tx...46% is holding...53%...48%...60% is nice to see as of 9/20 OV...55% is fine 1/21---> 42% is a bit concerning b/c of proteinuria, but not too far off his norm.  --  LIPIDS...he's maxed out as far as TG's go, but ; rec add fish oil...TG's down with wt loss and/or fish oil... and defer changes...110...99...114 ok for now 2/19...109 in 3/20 and will stop Tricor and try RYR with the statin...TG's 250 off tricor and on RYR; LDL only 70, so no changes needed---> 80 in 1/21.  CP is vague and has appt with cards next week or two...S/P CATH 4/13 with  no signif lesion.  --  DM up some to 6.8, but no change meds needed...ditto for 7.0 after holidays...6.0 with wt loss, so lower amaryl to qd...5.5 so stop it...6.8 so resume 1/2 of 2 mg tab in AM only...he was on 1/2 bid up until just a week ago; A1C 6.0, so ok to stay on 1/2 bid...6.3...6.1...5.9 and rec only take 1/2 in AM...6.4 is fine and will stop it on RTO if same...6.0 and will stop the 2 mg of amaryl now=2/19...6.8...6.3 is better ballpark off it---> 6.3 is same in 5/21.  MICRO-ALB = 140 at 2/18 OV; on max ARB/CCB, so will just monitor for now---> was 500 in '20 and now is 5000 in 5/21 = to RENAL and I will get U/S.  MORBID OBESITY down 25 past 6 mo to 226 at 6/16 OV--->225 holding.  --  PULM NODULES 11/14 (former tobacco screening)...no change 6/15 with f/u needed...LLL 5 mm is stable many years, but RUL 7 mm needs one more...neg and no further rec.  --  UGI BLEED=PUD s/p inject...Dr Duarte 12/12 with neg EGD...Hgb recovered...12.5 so resume iron...13.1 better and likely== CKD...12.7 is fine...13 +  FE DEF ANEMIA and I rec lower to qd for ferritin of 310 at 10/17 OV...12.2 and ferritin better, so stay on qd...had scopes and has f/u with Dr Duarte for path and labs as of 5/18 OV...12.2... 12.7 and ok to stay on for now...12.1 with stable iron is ok...11.4 is wrong direction, so will ask Dr Arguello to eval...she has him on more iron and B12; had capsule endoscopy neg '19 as well=defer to Dr Arguello---> 12.7 in 1/21 with stable iron.  LFT's with mild bump that Dr Duarte noted as well...wnl...still wnl, but agree with checking Hep C... Hep B/C neg.  GERD w/o dysphagia.  --  A.R.....on zyrtec/nasonex/singulair, and sees Dr Charles...had CT per Dr Mosqueda (ENT).  RAD w/o flare.   RUPESH on CPAP @ 15 cmH2O...quit using it past year b/c issues with getting parts?  --  URGENT VISIT 7/19 and 11/20:  R LEG PAIN=twisted knee a few weeks ago, and then fell in hole yesterday; throbs at rest, and painful to walk on; no swelling in  knee, and actually says pain is moreso in the hip; decent ROM, but has point tenderness in the greater trochanter; per orders and call Monday.  LBP into L LEG=I d/w him it's not the hip=saw Dr Meredith last month; no change bowel/bladder; is tender L greater trochanter, neg SLR; will get into PTA for L hip and LBP and add gabapentin/medrol...sxs resolved with just gabapentin and ? with passing kidney stone=d/w try weaning off it now...sxs in L hand and in L leg as of 3/20 OV that may be overuse syndrome=laying tile/etc for past 3 months; will get NCS given weak  and refer to Dr Randhawa again if worse.  --  KIDNEY STONES per a Dr Benavidez.  BPH/OAB per Urology in [place].  --  PSA 0.3 (7/11/12)...defer  COLON/EGD 6/23 = 5 polyps = 3 years per Dr. Campos.  (, my pt, 2 children)    Follow Up   Return in about 4 months (around 12/22/2023).  Patient was given instructions and counseling regarding his condition or for health maintenance advice. Please see specific information pulled into the AVS if appropriate.

## 2023-08-22 NOTE — ASSESSMENT & PLAN NOTE
Vitamin D is up from 19 to 34 as of his 8/23 office visit.  He is on once weekly prescription dosing and is stable to continue same.

## 2023-08-22 NOTE — ASSESSMENT & PLAN NOTE
Blood pressure stable as of his 8/23 office visit.  He can continue with full doses of candesartan and doxazosin, as well as moderate doses of nifedipine and carvedilol.

## 2023-08-22 NOTE — ASSESSMENT & PLAN NOTE
A1c is stable at 7.1 as of his 8/23 office visit.  This is just on low-dose metformin, so certainly appropriate to continue same.  Deferring SGLT2 inhibitor to nephrology.

## 2023-08-22 NOTE — ASSESSMENT & PLAN NOTE
GFR stable recently at 43.  Has follow-up scheduled with nephrology in about 6 weeks as of his 8/23 OV.

## 2023-08-30 RX ORDER — FINASTERIDE 5 MG/1
5 TABLET, FILM COATED ORAL DAILY
Qty: 90 TABLET | Refills: 1 | Status: SHIPPED | OUTPATIENT
Start: 2023-08-30

## 2023-09-07 DIAGNOSIS — E78.00 HIGH CHOLESTEROL: ICD-10-CM

## 2023-09-07 RX ORDER — PRAVASTATIN SODIUM 20 MG
20 TABLET ORAL NIGHTLY
Qty: 90 TABLET | Refills: 1 | Status: SHIPPED | OUTPATIENT
Start: 2023-09-07

## 2023-09-12 ENCOUNTER — OFFICE VISIT (OUTPATIENT)
Dept: UROLOGY | Facility: CLINIC | Age: 78
End: 2023-09-12
Payer: MEDICARE

## 2023-09-12 VITALS — BODY MASS INDEX: 30.41 KG/M2 | WEIGHT: 212.4 LBS | HEIGHT: 70 IN

## 2023-09-12 DIAGNOSIS — N39.41 URGE INCONTINENCE: ICD-10-CM

## 2023-09-12 PROCEDURE — 1159F MED LIST DOCD IN RCRD: CPT | Performed by: NURSE PRACTITIONER

## 2023-09-12 PROCEDURE — 99213 OFFICE O/P EST LOW 20 MIN: CPT | Performed by: NURSE PRACTITIONER

## 2023-09-12 PROCEDURE — 1160F RVW MEDS BY RX/DR IN RCRD: CPT | Performed by: NURSE PRACTITIONER

## 2023-09-12 NOTE — PROGRESS NOTES
Chief Complaint: Urinary Incontinence    Subjective         History of Present Illness  Telly Fitch is a 78 y.o. male presents to [facility] to be seen for f/u urinary incontinence.\\\    At last visit we gave patient samples of Myrbetriq for urge urinary incontinence.  The patient's wife presented to the office informing front office staff that the patient was having diarrhea and headaches after starting Myrbetriq.    We restarted him on tolterodine 4 mg daily.    He has had no change in his urinary symptoms.      Previous:    Patient had a urodynamics test performed on 6/29/2023 which revealed good flow with intermittency of right.  During the filling phase the bladder displayed increased sensation and a low bladder capacity due to bladder instability.  There was evidence of involuntary bladder contractions with urge urinary incontinence.  The detrusor was unstable.  There was no stress urinary incontinence observed.  During the voiding phase the bladder displayed a normal detrusor contraction with no evidence of abdominal muscle recruitment to assist voiding.  The flow was moderately good with a continuous rate the bladder emptied completely and the EMG was normal.  Diagnosis detrusor instability with urge urinary incontinence increase sensation and a low bladder capacity.    The patient sees [facility] for stage IIIb chronic kidney disease.  At his last visit in January he had complained of incontinence.  He is having to wear pads at all times.  Previously seen by Dr. Benavidez in [place].    He states issues with urinary leakage sine 2005. He states that in 2009 this worsened    He is currently on finasteride Cardura and tolterodine for several years.    He states that he has no sensation to void.    Nocturia x 3     Longstanding hx of renal stones.    He has seen cardiology before and is on nitro as needed for chest pain.    No family hx of   malignancies.      Objective     Past Medical History:   Diagnosis Date    Acute peptic ulcer, site unspecified, without hemorrhage or perforation     Basal cell carcinoma     facial and neck    BPH (benign prostatic hyperplasia)     CAD (coronary artery disease)     Cancer     multiple skin cancers    Chronic kidney disease, stage 3     DJD (degenerative joint disease)     Enlarged prostate without lower urinary tract symptoms (luts)     Essential (primary) hypertension     Hypercholesteremia     Intervertebral disc disorders with radiculopathy, lumbar region     Iron deficiency anemia, unspecified     Mixed hyperlipidemia     Morbid (severe) obesity due to excess calories     Nephrolithiasis     Nicotine dependence, unspecified, uncomplicated     RUPESH (obstructive sleep apnea)     RAD (reactive airway disease)     Sleep apnea, unspecified     Type 2 diabetes mellitus with other diabetic kidney complication     WITH CHRONIC KIDNEY DISEASE     Urinary incontinence        Past Surgical History:   Procedure Laterality Date    BASAL CELL CARCINOMA EXCISION  5/2023, 6/2023 6/2020; 11/12/2020; 12/2019    REMOVED FROM NOSE    CATARACT EXTRACTION, BILATERAL      COLONOSCOPY      COLONOSCOPY N/A 06/06/2023    Procedure: COLONOSCOPY WITH STOOL CULTURE AND POLYPECTOMIES;  Surgeon: Delroy Campos MD;  Location: [facility] ENDOSCOPY;  Service: General;  Laterality: N/A;  DIVERTICULOSIS  COLON POLYPS    DENTAL PROCEDURE  07/2019    ENDOSCOPY N/A 06/06/2023    Procedure: ESOPHAGOGASTRODUODENOSCOPY WITH BIOPSIES;  Surgeon: Delroy Campos MD;  Location: [facility] ENDOSCOPY;  Service: General;  Laterality: N/A;  GASTRITIS    INGUINAL HERNIA REPAIR Bilateral     done at separate times    KIDNEY STONE SURGERY Bilateral     MARCH AND APRIL/ REMOVAL (JULY/AUG)    OTHER SURGICAL HISTORY      BLEEDING ULCER    SKIN CANCER DESTRUCTION Right 07/2019    CANCER REMOVED FROM RIGHT TEMPLE    UMBILICAL HERNIA REPAIR N/A     R/L         Current  Outpatient Medications:     albuterol sulfate  (90 Base) MCG/ACT inhaler, As Needed., Disp: , Rfl:     ascorbic acid (VITAMIN C) 1000 MG tablet, 1 tablet 2 (Two) Times a Day., Disp: , Rfl:     aspirin 81 MG chewable tablet, aspirin 81 mg oral tablet,chewable chew 1 tablet (81 mg) by oral route once daily   Suspended, Disp: , Rfl:     candesartan (ATACAND) 32 MG tablet, Take 1 tablet by mouth Every Night for 90 days., Disp: 90 tablet, Rfl: 1    carvedilol (COREG) 12.5 MG tablet, Take 1 tablet by mouth 2 (Two) Times a Day., Disp: 180 tablet, Rfl: 1    dicyclomine (BENTYL) 20 MG tablet, As Needed., Disp: , Rfl:     doxazosin (CARDURA) 2 MG tablet, Take 4 tablets by mouth Daily for 90 days., Disp: 360 tablet, Rfl: 3    ergocalciferol (ERGOCALCIFEROL) 1.25 MG (16842 UT) capsule, Take 1 capsule by mouth., Disp: , Rfl:     finasteride (PROSCAR) 5 MG tablet, Take 1 tablet by mouth Daily., Disp: 90 tablet, Rfl: 1    fluticasone (Flonase) 50 MCG/ACT nasal spray, 2 sprays by Each Nare route Daily., Disp: 48 g, Rfl: 3    fluticasone-salmeterol (ADVAIR) 250-50 MCG/DOSE DISKUS, Inhale 1 puff 2 (Two) Times a Day., Disp: , Rfl:     furosemide (LASIX) 20 MG tablet, Take 0.5 tablets by mouth Daily., Disp: 45 tablet, Rfl: 1    loratadine (CLARITIN) 10 MG tablet, Take 1 tablet by mouth Daily for 90 days., Disp: 90 tablet, Rfl: 1    metFORMIN (GLUCOPHAGE) 500 MG tablet, Take 1 tablet by mouth 2 (Two) Times a Day., Disp: 180 tablet, Rfl: 1    montelukast (SINGULAIR) 10 MG tablet, Take 1 tablet by mouth Every Night., Disp: 90 tablet, Rfl: 3    multivitamin (THERAGRAN) tablet tablet, Take  by mouth Daily., Disp: , Rfl:     NIFEdipine XL (PROCARDIA XL) 90 MG 24 hr tablet, Take 1 tablet by mouth Daily., Disp: 90 tablet, Rfl: 1    nitroglycerin (Nitrostat) 0.4 MG SL tablet, Place 1 tablet under the tongue Every 5 (Five) Minutes As Needed for Chest Pain. Take no more than 3 doses in 15 minutes., Disp: 1 tablet, Rfl: 1    omeprazole  "(PrilOSEC) 20 MG capsule, Take 1 capsule by mouth Daily., Disp: 90 capsule, Rfl: 3    pravastatin (PRAVACHOL) 20 MG tablet, Take 1 tablet by mouth Every Night., Disp: 90 tablet, Rfl: 1    Red Yeast Rice 600 MG capsule, Take 1 capsule by mouth Daily., Disp: 90 each, Rfl: 1    sodium bicarbonate 650 MG tablet, Take 1 tablet by mouth 3 (Three) Times a Day., Disp: , Rfl:     Vibegron 75 MG tablet, Take 1 tablet by mouth Daily., Disp: 90 tablet, Rfl: 3    vitamin B-12 (CYANOCOBALAMIN) 1000 MCG tablet, Take 1 tablet by mouth Daily., Disp: 90 tablet, Rfl: 1    vitamin E 400 UNIT capsule, Take 1 capsule by mouth Daily., Disp: , Rfl:     Zinc 50 MG tablet, Take  by mouth Daily., Disp: , Rfl:     Allergies   Allergen Reactions    Contrast Dye (Echo Or Unknown Ct/Mr) Rash     Red rash, with itching    Lactose Intolerance (Gi) Diarrhea    Latex Rash     Skins peeling        History reviewed. No pertinent family history.    Social History     Socioeconomic History    Marital status:    Tobacco Use    Smoking status: Never     Passive exposure: Never    Smokeless tobacco: Never   Vaping Use    Vaping Use: Never used   Substance and Sexual Activity    Alcohol use: Never    Drug use: Never    Sexual activity: Defer       Vital Signs:   Ht 177.8 cm (70\")   Wt 96.3 kg (212 lb 6.4 oz)   BMI 30.48 kg/m²      Physical Exam     Result Review :   The following data was reviewed by: KEITH Pierce on 03/07/2023:  Results for orders placed or performed in visit on 08/14/23   Hemoglobin A1c    Specimen: Blood   Result Value Ref Range    Hemoglobin A1C 7.10 (H) 4.80 - 5.60 %   Vitamin D,25-Hydroxy    Specimen: Blood   Result Value Ref Range    25 Hydroxy, Vitamin D 34.0 30.0 - 100.0 ng/ml   TSH    Specimen: Blood   Result Value Ref Range    TSH 2.610 0.270 - 4.200 uIU/mL              Procedures        Assessment and Plan    Diagnoses and all orders for this visit:    1. Urge incontinence  -     Vibegron 75 MG tablet; Take 1 " tablet by mouth Daily.  Dispense: 90 tablet; Refill: 3        Today we discussed options for treatment including trialing Gemtesa to see if this would help with overactive bladder symptoms or potentially proceeding with either bladder Botox or InterStim.    The patient and patient's wife do not wish to proceed with bladder Botox they would like to try Gemtesa and potentially are considering InterStim.      I spent 10 minutes caring for Telly on this date of service. This time includes time spent by me in the following activities:reviewing tests, obtaining and/or reviewing a separately obtained history, performing a medically appropriate examination and/or evaluation , counseling and educating the patient/family/caregiver, ordering medications, tests, or procedures, and documenting information in the medical record  Follow Up   Return in about 7 weeks (around 10/31/2023) for Follow-up OAB.  Patient was given instructions and counseling regarding his condition or for health maintenance advice. Please see specific information pulled into the AVS if appropriate.         This document has been electronically signed by KEITH Pierce  September 12, 2023 13:34 EDT

## 2023-09-19 ENCOUNTER — TELEPHONE (OUTPATIENT)
Dept: INTERNAL MEDICINE | Facility: CLINIC | Age: 78
End: 2023-09-19
Payer: MEDICARE

## 2023-09-19 DIAGNOSIS — E11.9 TYPE 2 DIABETES MELLITUS WITHOUT COMPLICATION, WITHOUT LONG-TERM CURRENT USE OF INSULIN: ICD-10-CM

## 2023-09-19 NOTE — TELEPHONE ENCOUNTER
Patient's spouse came into office requesting a refill to be placed for metFORMIN (GLUCOPHAGE) 500 MG tablet .

## 2023-10-03 ENCOUNTER — TELEPHONE (OUTPATIENT)
Dept: UROLOGY | Facility: CLINIC | Age: 78
End: 2023-10-03
Payer: MEDICARE

## 2023-10-03 DIAGNOSIS — N39.41 URGE INCONTINENCE: Primary | ICD-10-CM

## 2023-10-03 RX ORDER — TOLTERODINE 2 MG/1
4 CAPSULE, EXTENDED RELEASE ORAL DAILY
Qty: 84 CAPSULE | Refills: 0 | Status: SHIPPED | OUTPATIENT
Start: 2023-10-03 | End: 2023-11-14

## 2023-10-03 NOTE — TELEPHONE ENCOUNTER
THIS PATIENT SEES CHRISSIE.      PATIENT'S WIFE, ISAIAS, CALLED.  SHE SAID HER  CANNOT TOLERATE THE MEDICATION [facility] PRESCRIBED AT LAST APPOINTMENT, WHICH IS THE GEMTESA.  SHE SAID IT MAKES HIM FEEL BAD, AND IT DOESN'T DECREASE THE URINATION FREQUENCY.    SHE SAID HE HAD SOME TOLTERODINE, WHICH HE TOOK PRIOR TO THIS, SO HE STARTED BACK ON IT.  HE HAS ENOUGH FOR 6 DAYS.    SHE ASKED FOR A PRESCRIPTION FOR TOLTERODINE 2 MG TWO DAILY TO BE SENT TO [facility].  SHE SAID HE TAKES 4 MG A DAY, BUT THEY ONLY CARRY 2 MG.      PLEASE CALL TO ADVISE PATIENT.    EDWAR, IS THIS SOMETHING YOU WILL ADDRESS?

## 2023-10-03 NOTE — TELEPHONE ENCOUNTER
I CALLED THE PATIENT'S WIFE, ISAIAS, AND TOLD HER THAT [facility] SENT IN ENOUGH OF THE MEDICATION TO LAST UNTIL PATIENT SEES CHRISSIE.

## 2023-10-10 ENCOUNTER — CLINICAL SUPPORT (OUTPATIENT)
Dept: INTERNAL MEDICINE | Facility: CLINIC | Age: 78
End: 2023-10-10
Payer: MEDICARE

## 2023-10-10 DIAGNOSIS — Z23 NEED FOR VACCINATION: Primary | ICD-10-CM

## 2023-10-10 RX ORDER — CANDESARTAN 32 MG/1
32 TABLET ORAL NIGHTLY
Qty: 90 TABLET | Refills: 3 | Status: SHIPPED | OUTPATIENT
Start: 2023-10-10

## 2023-10-17 ENCOUNTER — LAB (OUTPATIENT)
Dept: LAB | Facility: HOSPITAL | Age: 78
End: 2023-10-17
Payer: MEDICARE

## 2023-10-17 ENCOUNTER — TRANSCRIBE ORDERS (OUTPATIENT)
Dept: LAB | Facility: HOSPITAL | Age: 78
End: 2023-10-17
Payer: MEDICARE

## 2023-10-17 DIAGNOSIS — N18.32 CHRONIC KIDNEY DISEASE (CKD) STAGE G3B/A1, MODERATELY DECREASED GLOMERULAR FILTRATION RATE (GFR) BETWEEN 30-44 ML/MIN/1.73 SQUARE METER AND ALBUMINURIA CREATININE RATIO LESS THAN 30 MG/G (CMS/H*: ICD-10-CM

## 2023-10-17 DIAGNOSIS — I10 ESSENTIAL HYPERTENSION, MALIGNANT: ICD-10-CM

## 2023-10-17 DIAGNOSIS — E87.5 HYPERPOTASSEMIA: ICD-10-CM

## 2023-10-17 DIAGNOSIS — E78.2 MIXED HYPERLIPIDEMIA: ICD-10-CM

## 2023-10-17 DIAGNOSIS — E11.22 TYPE 2 DIABETES MELLITUS WITH ESRD (END-STAGE RENAL DISEASE): ICD-10-CM

## 2023-10-17 DIAGNOSIS — N18.6 TYPE 2 DIABETES MELLITUS WITH ESRD (END-STAGE RENAL DISEASE): ICD-10-CM

## 2023-10-17 DIAGNOSIS — E55.9 VITAMIN D DEFICIENCY: ICD-10-CM

## 2023-10-17 DIAGNOSIS — I25.10 DISEASE OF CARDIOVASCULAR SYSTEM: ICD-10-CM

## 2023-10-17 DIAGNOSIS — R60.1 ANASARCA: ICD-10-CM

## 2023-10-17 DIAGNOSIS — E78.5 HYPERLIPIDEMIA, UNSPECIFIED HYPERLIPIDEMIA TYPE: ICD-10-CM

## 2023-10-17 DIAGNOSIS — N18.32 CHRONIC KIDNEY DISEASE (CKD) STAGE G3B/A1, MODERATELY DECREASED GLOMERULAR FILTRATION RATE (GFR) BETWEEN 30-44 ML/MIN/1.73 SQUARE METER AND ALBUMINURIA CREATININE RATIO LESS THAN 30 MG/G (CMS/H*: Primary | ICD-10-CM

## 2023-10-17 LAB
25(OH)D3 SERPL-MCNC: 29.6 NG/ML (ref 30–100)
ALBUMIN SERPL-MCNC: 4.1 G/DL (ref 3.5–5.2)
ANION GAP SERPL CALCULATED.3IONS-SCNC: 10 MMOL/L (ref 5–15)
BACTERIA UR QL AUTO: ABNORMAL /HPF
BASOPHILS # BLD AUTO: 0.06 10*3/MM3 (ref 0–0.2)
BASOPHILS NFR BLD AUTO: 0.7 % (ref 0–1.5)
BILIRUB UR QL STRIP: NEGATIVE
BUN SERPL-MCNC: 34 MG/DL (ref 8–23)
BUN/CREAT SERPL: 19.3 (ref 7–25)
CALCIUM SPEC-SCNC: 9.6 MG/DL (ref 8.6–10.5)
CHLORIDE SERPL-SCNC: 105 MMOL/L (ref 98–107)
CLARITY UR: CLEAR
CO2 SERPL-SCNC: 24 MMOL/L (ref 22–29)
COLOR UR: YELLOW
CREAT SERPL-MCNC: 1.76 MG/DL (ref 0.76–1.27)
CREAT UR-MCNC: 38.6 MG/DL
DEPRECATED RDW RBC AUTO: 44.2 FL (ref 37–54)
EGFRCR SERPLBLD CKD-EPI 2021: 39.6 ML/MIN/1.73
EOSINOPHIL # BLD AUTO: 0.26 10*3/MM3 (ref 0–0.4)
EOSINOPHIL NFR BLD AUTO: 3 % (ref 0.3–6.2)
ERYTHROCYTE [DISTWIDTH] IN BLOOD BY AUTOMATED COUNT: 13.6 % (ref 12.3–15.4)
GLUCOSE SERPL-MCNC: 117 MG/DL (ref 65–99)
GLUCOSE UR STRIP-MCNC: NEGATIVE MG/DL
HCT VFR BLD AUTO: 37 % (ref 37.5–51)
HGB BLD-MCNC: 12.2 G/DL (ref 13–17.7)
HGB UR QL STRIP.AUTO: ABNORMAL
HYALINE CASTS UR QL AUTO: ABNORMAL /LPF
IMM GRANULOCYTES # BLD AUTO: 0.03 10*3/MM3 (ref 0–0.05)
IMM GRANULOCYTES NFR BLD AUTO: 0.4 % (ref 0–0.5)
KETONES UR QL STRIP: NEGATIVE
LEUKOCYTE ESTERASE UR QL STRIP.AUTO: ABNORMAL
LYMPHOCYTES # BLD AUTO: 2.18 10*3/MM3 (ref 0.7–3.1)
LYMPHOCYTES NFR BLD AUTO: 25.5 % (ref 19.6–45.3)
MCH RBC QN AUTO: 29.4 PG (ref 26.6–33)
MCHC RBC AUTO-ENTMCNC: 33 G/DL (ref 31.5–35.7)
MCV RBC AUTO: 89.2 FL (ref 79–97)
MONOCYTES # BLD AUTO: 0.55 10*3/MM3 (ref 0.1–0.9)
MONOCYTES NFR BLD AUTO: 6.4 % (ref 5–12)
NEUTROPHILS NFR BLD AUTO: 5.47 10*3/MM3 (ref 1.7–7)
NEUTROPHILS NFR BLD AUTO: 64 % (ref 42.7–76)
NITRITE UR QL STRIP: NEGATIVE
NRBC BLD AUTO-RTO: 0 /100 WBC (ref 0–0.2)
PH UR STRIP.AUTO: 6.5 [PH] (ref 5–8)
PHOSPHATE SERPL-MCNC: 3.8 MG/DL (ref 2.5–4.5)
PLATELET # BLD AUTO: 208 10*3/MM3 (ref 140–450)
PMV BLD AUTO: 11.4 FL (ref 6–12)
POTASSIUM SERPL-SCNC: 5.1 MMOL/L (ref 3.5–5.2)
PROT ?TM UR-MCNC: 131.9 MG/DL
PROT UR QL STRIP: ABNORMAL
PROT/CREAT UR: 3.42 MG/G{CREAT}
PTH-INTACT SERPL-MCNC: 43.5 PG/ML (ref 15–65)
RBC # BLD AUTO: 4.15 10*6/MM3 (ref 4.14–5.8)
RBC # UR STRIP: ABNORMAL /HPF
REF LAB TEST METHOD: ABNORMAL
SODIUM SERPL-SCNC: 139 MMOL/L (ref 136–145)
SP GR UR STRIP: 1.01 (ref 1–1.03)
SQUAMOUS #/AREA URNS HPF: ABNORMAL /HPF
UROBILINOGEN UR QL STRIP: ABNORMAL
WBC # UR STRIP: ABNORMAL /HPF
WBC NRBC COR # BLD: 8.55 10*3/MM3 (ref 3.4–10.8)

## 2023-10-17 PROCEDURE — 83970 ASSAY OF PARATHORMONE: CPT

## 2023-10-17 PROCEDURE — 82306 VITAMIN D 25 HYDROXY: CPT

## 2023-10-17 PROCEDURE — 84156 ASSAY OF PROTEIN URINE: CPT

## 2023-10-17 PROCEDURE — 80069 RENAL FUNCTION PANEL: CPT

## 2023-10-17 PROCEDURE — 81001 URINALYSIS AUTO W/SCOPE: CPT

## 2023-10-17 PROCEDURE — 82570 ASSAY OF URINE CREATININE: CPT

## 2023-10-17 PROCEDURE — 85025 COMPLETE CBC W/AUTO DIFF WBC: CPT

## 2023-10-17 PROCEDURE — 36415 COLL VENOUS BLD VENIPUNCTURE: CPT

## 2023-10-30 ENCOUNTER — TELEPHONE (OUTPATIENT)
Dept: UROLOGY | Facility: CLINIC | Age: 78
End: 2023-10-30
Payer: MEDICARE

## 2023-10-31 ENCOUNTER — OFFICE VISIT (OUTPATIENT)
Dept: UROLOGY | Facility: CLINIC | Age: 78
End: 2023-10-31
Payer: MEDICARE

## 2023-10-31 VITALS
SYSTOLIC BLOOD PRESSURE: 167 MMHG | HEIGHT: 70 IN | WEIGHT: 220 LBS | HEART RATE: 70 BPM | RESPIRATION RATE: 12 BRPM | DIASTOLIC BLOOD PRESSURE: 80 MMHG | BODY MASS INDEX: 31.5 KG/M2

## 2023-10-31 DIAGNOSIS — N39.41 URGE INCONTINENCE: ICD-10-CM

## 2023-10-31 PROBLEM — N39.45: Status: ACTIVE | Noted: 2023-10-23

## 2023-10-31 RX ORDER — HYDROCODONE BITARTRATE AND ACETAMINOPHEN 5; 325 MG/1; MG/1
1 TABLET ORAL EVERY 6 HOURS PRN
COMMUNITY
Start: 2023-09-14

## 2023-10-31 RX ORDER — CRANBERRY FRUIT EXTRACT 200 MG
1 CAPSULE ORAL
COMMUNITY

## 2023-10-31 RX ORDER — TOLTERODINE 2 MG/1
4 CAPSULE, EXTENDED RELEASE ORAL DAILY
Qty: 180 CAPSULE | Refills: 3 | Status: SHIPPED | OUTPATIENT
Start: 2023-10-31

## 2023-10-31 NOTE — PROGRESS NOTES
Chief Complaint: overactive bladder (Pt here for follow up.  Pt is taking detrol.  He will need a refill.)    Subjective         History of Present Illness  Telly Fitch is a 78 y.o. male presents to [facility] to be seen for f/u urinary incontinence.    At last visit we tried him on gemtesa to see if this would help refractory symptoms.     He cannot specifically state what his adverse reaction was but states that he did not feel well when he was on Gemtesa.    He called the office and was placed back on tolterodine from our other nurse practitioner.    He states nocturia x 3-4.     He has intermittent issues with urgency and frequency during the day.    Previous:     At last visit we gave patient samples of Myrbetriq for urge urinary incontinence.  The patient's wife presented to the office informing front office staff that the patient was having diarrhea and headaches after starting Myrbetriq.    We restarted him on tolterodine 4 mg daily.    He has had no change in his urinary symptoms.    Patient had a urodynamics test performed on 6/29/2023 which revealed good flow with intermittency of right.  During the filling phase the bladder displayed increased sensation and a low bladder capacity due to bladder instability.  There was evidence of involuntary bladder contractions with urge urinary incontinence.  The detrusor was unstable.  There was no stress urinary incontinence observed.  During the voiding phase the bladder displayed a normal detrusor contraction with no evidence of abdominal muscle recruitment to assist voiding.  The flow was moderately good with a continuous rate the bladder emptied completely and the EMG was normal.  Diagnosis detrusor instability with urge urinary incontinence increase sensation and a low bladder capacity.    The patient sees [facility] for stage IIIb chronic kidney disease.  At his last visit in January he had complained of incontinence.   He is having to wear pads at all times.  Previously seen by Dr. Benavidez in [place].    He states issues with urinary leakage sine 2005. He states that in 2009 this worsened    He is currently on finasteride Cardura and tolterodine for several years.    He states that he has no sensation to void.    Nocturia x 3     Longstanding hx of renal stones.    He has seen cardiology before and is on nitro as needed for chest pain.    No family hx of  malignancies.      Objective     Past Medical History:   Diagnosis Date    Acute peptic ulcer, site unspecified, without hemorrhage or perforation     Basal cell carcinoma     facial and neck    BPH (benign prostatic hyperplasia)     CAD (coronary artery disease)     Cancer     multiple skin cancers    Chronic kidney disease, stage 3     DJD (degenerative joint disease)     Enlarged prostate without lower urinary tract symptoms (luts)     Essential (primary) hypertension     Hypercholesteremia     Intervertebral disc disorders with radiculopathy, lumbar region     Iron deficiency anemia, unspecified     Mixed hyperlipidemia     Morbid (severe) obesity due to excess calories     Nephrolithiasis     Nicotine dependence, unspecified, uncomplicated     RUPESH (obstructive sleep apnea)     RAD (reactive airway disease)     Sleep apnea, unspecified     Type 2 diabetes mellitus with other diabetic kidney complication     WITH CHRONIC KIDNEY DISEASE     Urinary incontinence        Past Surgical History:   Procedure Laterality Date    BASAL CELL CARCINOMA EXCISION  5/2023, 6/2023 6/2020; 11/12/2020; 12/2019    REMOVED FROM NOSE    CATARACT EXTRACTION, BILATERAL      COLONOSCOPY      COLONOSCOPY N/A 06/06/2023    Procedure: COLONOSCOPY WITH STOOL CULTURE AND POLYPECTOMIES;  Surgeon: Delroy Campos MD;  Location: [facility] ENDOSCOPY;  Service: General;  Laterality: N/A;  DIVERTICULOSIS  COLON POLYPS    DENTAL PROCEDURE  07/2019    ENDOSCOPY N/A 06/06/2023    Procedure:  ESOPHAGOGASTRODUODENOSCOPY WITH BIOPSIES;  Surgeon: Delroy Campos MD;  Location: [facility] ENDOSCOPY;  Service: General;  Laterality: N/A;  GASTRITIS    INGUINAL HERNIA REPAIR Bilateral     done at separate times    KIDNEY STONE SURGERY Bilateral     MARCH AND APRIL/ REMOVAL (JULY/AUG)    OTHER SURGICAL HISTORY      BLEEDING ULCER    SKIN CANCER DESTRUCTION Right 07/2019    CANCER REMOVED FROM RIGHT TEMPLE    UMBILICAL HERNIA REPAIR N/A     R/L         Current Outpatient Medications:     albuterol sulfate  (90 Base) MCG/ACT inhaler, As Needed., Disp: , Rfl:     ascorbic acid (VITAMIN C) 1000 MG tablet, 1 tablet 2 (Two) Times a Day., Disp: , Rfl:     aspirin 81 MG chewable tablet, aspirin 81 mg oral tablet,chewable chew 1 tablet (81 mg) by oral route once daily   Suspended, Disp: , Rfl:     candesartan (ATACAND) 32 MG tablet, Take 1 tablet by mouth Every Night., Disp: 90 tablet, Rfl: 3    carvedilol (COREG) 12.5 MG tablet, Take 1 tablet by mouth 2 (Two) Times a Day., Disp: 180 tablet, Rfl: 1    dicyclomine (BENTYL) 20 MG tablet, As Needed., Disp: , Rfl:     ergocalciferol (ERGOCALCIFEROL) 1.25 MG (90663 UT) capsule, Take 1 capsule by mouth., Disp: , Rfl:     finasteride (PROSCAR) 5 MG tablet, Take 1 tablet by mouth Daily., Disp: 90 tablet, Rfl: 1    fluticasone (Flonase) 50 MCG/ACT nasal spray, 2 sprays by Each Nare route Daily., Disp: 48 g, Rfl: 3    fluticasone-salmeterol (ADVAIR) 250-50 MCG/DOSE DISKUS, Inhale 1 puff 2 (Two) Times a Day., Disp: , Rfl:     furosemide (LASIX) 20 MG tablet, Take 0.5 tablets by mouth Daily., Disp: 45 tablet, Rfl: 1    HYDROcodone-acetaminophen (NORCO) 5-325 MG per tablet, Take 1 tablet by mouth Every 6 (Six) Hours As Needed. for pain, Disp: , Rfl:     metFORMIN (GLUCOPHAGE) 500 MG tablet, Take 1 tablet by mouth 2 (Two) Times a Day., Disp: 180 tablet, Rfl: 3    montelukast (SINGULAIR) 10 MG tablet, Take 1 tablet by mouth Every Night., Disp: 90 tablet, Rfl: 3    multivitamin  (THERAGRAN) tablet tablet, Take  by mouth Daily., Disp: , Rfl:     NIFEdipine XL (PROCARDIA XL) 90 MG 24 hr tablet, Take 1 tablet by mouth Daily., Disp: 90 tablet, Rfl: 1    nitroglycerin (Nitrostat) 0.4 MG SL tablet, Place 1 tablet under the tongue Every 5 (Five) Minutes As Needed for Chest Pain. Take no more than 3 doses in 15 minutes., Disp: 1 tablet, Rfl: 1    omeprazole (PrilOSEC) 20 MG capsule, Take 1 capsule by mouth Daily., Disp: 90 capsule, Rfl: 3    pravastatin (PRAVACHOL) 20 MG tablet, Take 1 tablet by mouth Every Night., Disp: 90 tablet, Rfl: 1    Red Yeast Rice Extract 600 MG capsule, Take 1 tablet by mouth., Disp: , Rfl:     sodium bicarbonate 650 MG tablet, Take 1 tablet by mouth 3 (Three) Times a Day., Disp: , Rfl:     tolterodine LA (DETROL LA) 2 MG 24 hr capsule, Take 2 capsules by mouth Daily., Disp: 180 capsule, Rfl: 3    vitamin B-12 (CYANOCOBALAMIN) 1000 MCG tablet, Take 1 tablet by mouth Daily., Disp: 90 tablet, Rfl: 1    vitamin E 400 UNIT capsule, Take 1 capsule by mouth Daily., Disp: , Rfl:     Zinc 50 MG tablet, Take  by mouth Daily., Disp: , Rfl:     doxazosin (CARDURA) 2 MG tablet, Take 4 tablets by mouth Daily for 90 days., Disp: 360 tablet, Rfl: 3    loratadine (CLARITIN) 10 MG tablet, Take 1 tablet by mouth Daily for 90 days., Disp: 90 tablet, Rfl: 1    Allergies   Allergen Reactions    Statins Unknown - Low Severity     Other reaction(s): Myalgia, Other: tolerates Pravastatin per pt, Myalgia, Other: tolerates Pravastatin per pt    Contrast Dye (Echo Or Unknown Ct/Mr) Rash     Red rash, with itching    Lactose Intolerance (Gi) Diarrhea    Latex Rash     Skins peeling        History reviewed. No pertinent family history.    Social History     Socioeconomic History    Marital status:    Tobacco Use    Smoking status: Never     Passive exposure: Never    Smokeless tobacco: Never   Vaping Use    Vaping Use: Never used   Substance and Sexual Activity    Alcohol use: Never    Drug  "use: Never    Sexual activity: Defer       Vital Signs:   /80 (BP Location: Left arm, Patient Position: Sitting)   Pulse 70   Resp 12   Ht 177.8 cm (70\")   Wt 99.8 kg (220 lb)   BMI 31.57 kg/m²      Physical Exam     Result Review :   The following data was reviewed by: KEITH Pierce on 03/07/2023:  Results for orders placed or performed in visit on 10/17/23   PTH, Intact    Specimen: Blood   Result Value Ref Range    PTH, Intact 43.5 15.0 - 65.0 pg/mL   Renal Function Panel    Specimen: Blood   Result Value Ref Range    Glucose 117 (H) 65 - 99 mg/dL    BUN 34 (H) 8 - 23 mg/dL    Creatinine 1.76 (H) 0.76 - 1.27 mg/dL    Sodium 139 136 - 145 mmol/L    Potassium 5.1 3.5 - 5.2 mmol/L    Chloride 105 98 - 107 mmol/L    CO2 24.0 22.0 - 29.0 mmol/L    Calcium 9.6 8.6 - 10.5 mg/dL    Albumin 4.1 3.5 - 5.2 g/dL    Phosphorus 3.8 2.5 - 4.5 mg/dL    Anion Gap 10.0 5.0 - 15.0 mmol/L    BUN/Creatinine Ratio 19.3 7.0 - 25.0    eGFR 39.6 (L) >60.0 mL/min/1.73   Urinalysis With Microscopic If Indicated (No Culture) - Urine, Clean Catch    Specimen: Urine, Clean Catch   Result Value Ref Range    Color, UA Yellow Yellow, Straw    Appearance, UA Clear Clear    pH, UA 6.5 5.0 - 8.0    Specific Gravity, UA 1.012 1.005 - 1.030    Glucose, UA Negative Negative    Ketones, UA Negative Negative    Bilirubin, UA Negative Negative    Blood, UA Trace (A) Negative    Protein, UA >=300 mg/dL (3+) (A) Negative    Leuk Esterase, UA Moderate (2+) (A) Negative    Nitrite, UA Negative Negative    Urobilinogen, UA 0.2 E.U./dL 0.2 - 1.0 E.U./dL   Protein / Creatinine Ratio, Urine - Urine, Clean Catch    Specimen: Urine, Clean Catch   Result Value Ref Range    Creatinine, Urine 38.6 mg/dL    Total Protein, Urine 131.9 mg/dL    Protein/Creatinine Ratio, Urine 3.42    Vitamin D,25-Hydroxy    Specimen: Blood   Result Value Ref Range    25 Hydroxy, Vitamin D 29.6 (L) 30.0 - 100.0 ng/ml   CBC Auto Differential    Specimen: Blood   Result " Value Ref Range    WBC 8.55 3.40 - 10.80 10*3/mm3    RBC 4.15 4.14 - 5.80 10*6/mm3    Hemoglobin 12.2 (L) 13.0 - 17.7 g/dL    Hematocrit 37.0 (L) 37.5 - 51.0 %    MCV 89.2 79.0 - 97.0 fL    MCH 29.4 26.6 - 33.0 pg    MCHC 33.0 31.5 - 35.7 g/dL    RDW 13.6 12.3 - 15.4 %    RDW-SD 44.2 37.0 - 54.0 fl    MPV 11.4 6.0 - 12.0 fL    Platelets 208 140 - 450 10*3/mm3    Neutrophil % 64.0 42.7 - 76.0 %    Lymphocyte % 25.5 19.6 - 45.3 %    Monocyte % 6.4 5.0 - 12.0 %    Eosinophil % 3.0 0.3 - 6.2 %    Basophil % 0.7 0.0 - 1.5 %    Immature Grans % 0.4 0.0 - 0.5 %    Neutrophils, Absolute 5.47 1.70 - 7.00 10*3/mm3    Lymphocytes, Absolute 2.18 0.70 - 3.10 10*3/mm3    Monocytes, Absolute 0.55 0.10 - 0.90 10*3/mm3    Eosinophils, Absolute 0.26 0.00 - 0.40 10*3/mm3    Basophils, Absolute 0.06 0.00 - 0.20 10*3/mm3    Immature Grans, Absolute 0.03 0.00 - 0.05 10*3/mm3    nRBC 0.0 0.0 - 0.2 /100 WBC   Urinalysis, Microscopic Only - Urine, Clean Catch    Specimen: Urine, Clean Catch   Result Value Ref Range    RBC, UA 0-2 None Seen, 0-2 /HPF    WBC, UA Too Numerous to Count (A) None Seen, 0-2 /HPF    Bacteria, UA None Seen None Seen /HPF    Squamous Epithelial Cells, UA 0-2 None Seen, 0-2 /HPF    Hyaline Casts, UA 3-6 None Seen /LPF    Methodology Automated Microscopy               Procedures        Assessment and Plan    Diagnoses and all orders for this visit:    1. Urge incontinence  -     tolterodine LA (DETROL LA) 2 MG 24 hr capsule; Take 2 capsules by mouth Daily.  Dispense: 180 capsule; Refill: 3          Order written for tolterodine.    Did discuss with the patient and patient's wife once again that his uncontrolled or unmanaged sleep apnea can have a direct correlation with his nighttime urinary events.    Also discussed potentially getting a cystoscopy to evaluate the need for outlet procedure given moderate flow on his last UDS.    I do not wish to proceed at this time however will consider and we will follow-up with him  in 6 months or sooner if needed.      I spent 10 minutes caring for Telly on this date of service. This time includes time spent by me in the following activities:reviewing tests, obtaining and/or reviewing a separately obtained history, performing a medically appropriate examination and/or evaluation , counseling and educating the patient/family/caregiver, ordering medications, tests, or procedures, and documenting information in the medical record  Follow Up   Return in about 6 months (around 4/30/2024) for f/u OAB.  Patient was given instructions and counseling regarding his condition or for health maintenance advice. Please see specific information pulled into the AVS if appropriate.         This document has been electronically signed by KEITH Pierce  October 31, 2023 13:35 EDT

## 2023-11-27 RX ORDER — LANOLIN ALCOHOL/MO/W.PET/CERES
1000 CREAM (GRAM) TOPICAL DAILY
Qty: 90 TABLET | Refills: 3 | Status: SHIPPED | OUTPATIENT
Start: 2023-11-27

## 2023-12-04 ENCOUNTER — LAB (OUTPATIENT)
Dept: LAB | Facility: HOSPITAL | Age: 78
End: 2023-12-04
Payer: MEDICARE

## 2023-12-04 DIAGNOSIS — E11.9 TYPE 2 DIABETES MELLITUS WITHOUT COMPLICATION, WITHOUT LONG-TERM CURRENT USE OF INSULIN: ICD-10-CM

## 2023-12-04 DIAGNOSIS — I10 BENIGN ESSENTIAL HTN: ICD-10-CM

## 2023-12-04 DIAGNOSIS — N18.32 STAGE 3B CHRONIC KIDNEY DISEASE: ICD-10-CM

## 2023-12-04 LAB
ANION GAP SERPL CALCULATED.3IONS-SCNC: 13.3 MMOL/L (ref 5–15)
BASOPHILS # BLD AUTO: 0.06 10*3/MM3 (ref 0–0.2)
BASOPHILS NFR BLD AUTO: 0.5 % (ref 0–1.5)
BUN SERPL-MCNC: 33 MG/DL (ref 8–23)
BUN/CREAT SERPL: 17.1 (ref 7–25)
CALCIUM SPEC-SCNC: 9.8 MG/DL (ref 8.6–10.5)
CHLORIDE SERPL-SCNC: 103 MMOL/L (ref 98–107)
CO2 SERPL-SCNC: 22.7 MMOL/L (ref 22–29)
CREAT SERPL-MCNC: 1.93 MG/DL (ref 0.76–1.27)
DEPRECATED RDW RBC AUTO: 41 FL (ref 37–54)
EGFRCR SERPLBLD CKD-EPI 2021: 35.4 ML/MIN/1.73
EOSINOPHIL # BLD AUTO: 0.25 10*3/MM3 (ref 0–0.4)
EOSINOPHIL NFR BLD AUTO: 2.3 % (ref 0.3–6.2)
ERYTHROCYTE [DISTWIDTH] IN BLOOD BY AUTOMATED COUNT: 12.8 % (ref 12.3–15.4)
GLUCOSE SERPL-MCNC: 140 MG/DL (ref 65–99)
HBA1C MFR BLD: 7.4 % (ref 4.8–5.6)
HCT VFR BLD AUTO: 35.6 % (ref 37.5–51)
HGB BLD-MCNC: 11.4 G/DL (ref 13–17.7)
IMM GRANULOCYTES # BLD AUTO: 0.04 10*3/MM3 (ref 0–0.05)
IMM GRANULOCYTES NFR BLD AUTO: 0.4 % (ref 0–0.5)
LYMPHOCYTES # BLD AUTO: 2.14 10*3/MM3 (ref 0.7–3.1)
LYMPHOCYTES NFR BLD AUTO: 19.5 % (ref 19.6–45.3)
MCH RBC QN AUTO: 28.1 PG (ref 26.6–33)
MCHC RBC AUTO-ENTMCNC: 32 G/DL (ref 31.5–35.7)
MCV RBC AUTO: 87.7 FL (ref 79–97)
MONOCYTES # BLD AUTO: 0.84 10*3/MM3 (ref 0.1–0.9)
MONOCYTES NFR BLD AUTO: 7.7 % (ref 5–12)
NEUTROPHILS NFR BLD AUTO: 69.6 % (ref 42.7–76)
NEUTROPHILS NFR BLD AUTO: 7.63 10*3/MM3 (ref 1.7–7)
NRBC BLD AUTO-RTO: 0 /100 WBC (ref 0–0.2)
PLATELET # BLD AUTO: 187 10*3/MM3 (ref 140–450)
PMV BLD AUTO: 11.5 FL (ref 6–12)
POTASSIUM SERPL-SCNC: 4.4 MMOL/L (ref 3.5–5.2)
RBC # BLD AUTO: 4.06 10*6/MM3 (ref 4.14–5.8)
SODIUM SERPL-SCNC: 139 MMOL/L (ref 136–145)
WBC NRBC COR # BLD AUTO: 10.96 10*3/MM3 (ref 3.4–10.8)

## 2023-12-04 PROCEDURE — 80048 BASIC METABOLIC PNL TOTAL CA: CPT

## 2023-12-04 PROCEDURE — 85025 COMPLETE CBC W/AUTO DIFF WBC: CPT

## 2023-12-04 PROCEDURE — 36415 COLL VENOUS BLD VENIPUNCTURE: CPT

## 2023-12-04 PROCEDURE — 83036 HEMOGLOBIN GLYCOSYLATED A1C: CPT

## 2023-12-13 ENCOUNTER — OFFICE VISIT (OUTPATIENT)
Dept: INTERNAL MEDICINE | Facility: CLINIC | Age: 78
End: 2023-12-13
Payer: MEDICARE

## 2023-12-13 VITALS
SYSTOLIC BLOOD PRESSURE: 130 MMHG | WEIGHT: 223.2 LBS | TEMPERATURE: 98.2 F | DIASTOLIC BLOOD PRESSURE: 80 MMHG | HEART RATE: 67 BPM | BODY MASS INDEX: 31.95 KG/M2 | OXYGEN SATURATION: 94 % | HEIGHT: 70 IN

## 2023-12-13 DIAGNOSIS — E87.5 HYPERKALEMIA: ICD-10-CM

## 2023-12-13 DIAGNOSIS — E11.9 TYPE 2 DIABETES MELLITUS WITHOUT COMPLICATION, WITHOUT LONG-TERM CURRENT USE OF INSULIN: Primary | ICD-10-CM

## 2023-12-13 DIAGNOSIS — I10 BENIGN ESSENTIAL HTN: ICD-10-CM

## 2023-12-13 DIAGNOSIS — D50.9 IRON DEFICIENCY ANEMIA, UNSPECIFIED IRON DEFICIENCY ANEMIA TYPE: ICD-10-CM

## 2023-12-13 DIAGNOSIS — N18.32 STAGE 3B CHRONIC KIDNEY DISEASE: ICD-10-CM

## 2023-12-13 DIAGNOSIS — E78.2 MIXED HYPERLIPIDEMIA: ICD-10-CM

## 2023-12-13 NOTE — ASSESSMENT & PLAN NOTE
GFR is down some to 35 as of his 12/23 office visit.  This is not his merna, and he is followed closely by nephrology as well.  His electrolytes are fine and his bicarb is 23 on low-dose supplementation, stable to continue same.

## 2023-12-13 NOTE — ASSESSMENT & PLAN NOTE
A1c has trended up to 7.4 as of 12/23.  He is just on low-dose metformin, but he has underlying CKD 3b, so probably best that we do not titrate this.  As noted, we considered Jardiance, but he does have underlying urinary incontinence.  Will go with low-dose Januvia instead.

## 2023-12-13 NOTE — PROGRESS NOTES
"Chief Complaint  Diabetes and Follow-up (Pt states that this is a routine visit. He had labs, and reports no issues at this time.)    Subjective          Telly Fitch presents to [facility]     History of Present Illness  Patient pleasant 78-year-old male with underlying hypertension, hyperlipidemia, chronic kidney disease stage 3a, as well as diabetes mellitus, who is coming in 12/23 for routine 4-month follow-up.  We will review his labs, address any new concerns, and make new recommendations at that time.    Review of Systems   Constitutional:  Negative for appetite change, fatigue and fever.   HENT:  Negative for congestion and ear pain.    Eyes:  Negative for blurred vision.   Respiratory:  Negative for cough, chest tightness, shortness of breath and wheezing.    Cardiovascular:  Negative for chest pain, palpitations and leg swelling.   Gastrointestinal:  Negative for abdominal pain.   Genitourinary:  Negative for difficulty urinating, dysuria and hematuria.   Musculoskeletal:  Negative for arthralgias and gait problem.   Skin:  Negative for skin lesions.   Neurological:  Negative for syncope, memory problem and confusion.   Psychiatric/Behavioral:  Negative for self-injury and depressed mood.        Objective   Vital Signs:   /80   Pulse 67   Temp 98.2 °F (36.8 °C) (Skin)   Ht 177.8 cm (70\")   Wt 101 kg (223 lb 3.2 oz)   SpO2 94%   BMI 32.03 kg/m²           Physical Exam  Vitals and nursing note reviewed.   Constitutional:       General: He is not in acute distress.     Appearance: Normal appearance. He is not toxic-appearing.   HENT:      Head: Atraumatic.      Right Ear: External ear normal.      Left Ear: External ear normal.      Nose: Nose normal.      Mouth/Throat:      Mouth: Mucous membranes are moist.   Eyes:      General:         Right eye: No discharge.         Left eye: No discharge.      Extraocular Movements: Extraocular movements intact.     "  Pupils: Pupils are equal, round, and reactive to light.   Cardiovascular:      Rate and Rhythm: Normal rate and regular rhythm.      Pulses: Normal pulses.      Heart sounds: Normal heart sounds. No murmur heard.     No gallop.   Pulmonary:      Effort: Pulmonary effort is normal. No respiratory distress.      Breath sounds: No wheezing, rhonchi or rales.   Abdominal:      General: There is no distension.      Palpations: Abdomen is soft. There is no mass.      Tenderness: There is no abdominal tenderness. There is no guarding.   Musculoskeletal:         General: No swelling or tenderness.      Cervical back: No tenderness.      Right lower leg: No edema.      Left lower leg: No edema.   Skin:     General: Skin is warm and dry.      Findings: No rash.   Neurological:      General: No focal deficit present.      Mental Status: He is alert and oriented to person, place, and time. Mental status is at baseline.      Motor: No weakness.      Gait: Gait normal.   Psychiatric:         Mood and Affect: Mood normal.         Thought Content: Thought content normal.          Result Review :   The following data was reviewed by: Anuel Fisher MD on 09/16/2021:                  Assessment and Plan    Diagnoses and all orders for this visit:    1. Type 2 diabetes mellitus without complication, without long-term current use of insulin (Primary)  Overview:  SGLT2 inhibitors are contraindicated, patient has urinary incontinence, followed by urology.    Assessment & Plan:  A1c has trended up to 7.4 as of 12/23.  He is just on low-dose metformin, but he has underlying CKD 3b, so probably best that we do not titrate this.  As noted, we considered Jardiance, but he does have underlying urinary incontinence.  Will go with low-dose Januvia instead.    Orders:  -     Hemoglobin A1c; Future    2. Stage 3b chronic kidney disease  Assessment & Plan:  GFR is down some to 35 as of his 12/23 office visit.  This is not his merna, and he is  followed closely by nephrology as well.  His electrolytes are fine and his bicarb is 23 on low-dose supplementation, stable to continue same.    Orders:  -     Comprehensive Metabolic Panel; Future    3. Hyperkalemia  Assessment & Plan:  Patient is down from 5.1 to 4.4 as of his 12/23 office visit.  This is without any prescription agents to this regards, certainly nice to see.  Patient is stable to continue full dose ARB.      4. Benign essential HTN  Assessment & Plan:  Blood pressure remains well-controlled and his pulse is in the mid 60s as of his 12/23 office visit.  He will continue with full doses of candesartan and doxazosin, also on moderate dose nifedipine and carvedilol.      5. Mixed hyperlipidemia  -     Lipid Panel; Future    6. Iron deficiency anemia, unspecified iron deficiency anemia type  -     CBC & Differential; Future  -     Iron Profile; Future  -     Ferritin; Future    Other orders  -     SITagliptin (JANUVIA) 25 MG tablet; Take 1 tablet by mouth Daily for 90 days.  Dispense: 90 tablet; Refill: 1           --  --  OLDER NOTES:  VISIT 5/21:  --  HTN typically controlled and please check at home...HCTZ added since last OV; currently high in AM, ? related to CPAP he quit using; will change flomax to cardura and titrate; if no benefit, then will need to get back on CPAP...try 12.5 HCTZ given urinary c/o...seeing Dr Barrera and I rec 24-hr BP monitor and then review results with him on RTO ( ? related to not being on CPAP)...better 8/18, but will try off HCTZ given incont and increase cardura...has not needed PRN clonidine given at 1/19 urgent visit; stable 2/19...up 3/20 and will see if RYR helps=wife thinks it will...145/70 at home=bring wrist cuff on RTO...his cuff reads about 10 pts high top/bottom, but his numbers are up more at home as well; will max out cardura 9/20---> wel controlled 5/21.  CKD3a stable=1.6 (42%) and d/w no nsaids...stable 10/16 = 44%...54% nice...42% ? realted to recent  stone tx...46% is holding...53%...48%...60% is nice to see as of 9/20 OV...55% is fine 1/21---> 42% is a bit concerning b/c of proteinuria, but not too far off his norm.  --  LIPIDS...he's maxed out as far as TG's go, but ; rec add fish oil...TG's down with wt loss and/or fish oil... and defer changes...110...99...114 ok for now 2/19...109 in 3/20 and will stop Tricor and try RYR with the statin...TG's 250 off tricor and on RYR; LDL only 70, so no changes needed---> 80 in 1/21.  CP is vague and has appt with cards next week or two...S/P CATH 4/13 with no signif lesion.  --  DM up some to 6.8, but no change meds needed...ditto for 7.0 after holidays...6.0 with wt loss, so lower amaryl to qd...5.5 so stop it...6.8 so resume 1/2 of 2 mg tab in AM only...he was on 1/2 bid up until just a week ago; A1C 6.0, so ok to stay on 1/2 bid...6.3...6.1...5.9 and rec only take 1/2 in AM...6.4 is fine and will stop it on RTO if same...6.0 and will stop the 2 mg of amaryl now=2/19...6.8...6.3 is better ballpark off it---> 6.3 is same in 5/21.  MICRO-ALB = 140 at 2/18 OV; on max ARB/CCB, so will just monitor for now---> was 500 in '20 and now is 5000 in 5/21 = to RENAL and I will get U/S.  MORBID OBESITY down 25 past 6 mo to 226 at 6/16 OV--->225 holding.  --  PULM NODULES 11/14 (former tobacco screening)...no change 6/15 with f/u needed...LLL 5 mm is stable many years, but RUL 7 mm needs one more...neg and no further rec.  --  UGI BLEED=PUD s/p inject...Dr Duarte 12/12 with neg EGD...Hgb recovered...12.5 so resume iron...13.1 better and likely== CKD...12.7 is fine...13 +  FE DEF ANEMIA and I rec lower to qd for ferritin of 310 at 10/17 OV...12.2 and ferritin better, so stay on qd...had scopes and has f/u with Dr Duarte for path and labs as of 5/18 OV...12.2... 12.7 and ok to stay on for now...12.1 with stable iron is ok...11.4 is wrong direction, so will ask Dr Arguello to eval...she has him on more iron and B12; had  capsule endoscopy neg '19 as well=defer to Dr Arguello---> 12.7 in 1/21 with stable iron.  LFT's with mild bump that Dr Duarte noted as well...wnl...still wnl, but agree with checking Hep C... Hep B/C neg.  GERD w/o dysphagia.  --  A.R.....on zyrtec/nasonex/singulair, and sees Dr Charles...had CT per Dr Mosqeuda (ENT).  RAD w/o flare.   RUPESH on CPAP @ 15 cmH2O...quit using it past year b/c issues with getting parts?  --  URGENT VISIT 7/19 and 11/20:  R LEG PAIN=twisted knee a few weeks ago, and then fell in hole yesterday; throbs at rest, and painful to walk on; no swelling in knee, and actually says pain is moreso in the hip; decent ROM, but has point tenderness in the greater trochanter; per orders and call Monday.  LBP into L LEG=I d/w him it's not the hip=saw Dr Meredith last month; no change bowel/bladder; is tender L greater trochanter, neg SLR; will get into PTA for L hip and LBP and add gabapentin/medrol...sxs resolved with just gabapentin and ? with passing kidney stone=d/w try weaning off it now...sxs in L hand and in L leg as of 3/20 OV that may be overuse syndrome=laying tile/etc for past 3 months; will get NCS given weak  and refer to Dr Randhawa again if worse.  --  KIDNEY STONES per a Dr Benavidez.  BPH/OAB per Urology in [place].  --  PSA 0.3 (7/11/12)...defer  COLON/EGD 6/23 = 5 polyps = 3 years per Dr. Campos.  (, my pt, 2 children)    Follow Up   Return in about 4 months (around 4/13/2024).  Patient was given instructions and counseling regarding his condition or for health maintenance advice. Please see specific information pulled into the AVS if appropriate.

## 2023-12-13 NOTE — ASSESSMENT & PLAN NOTE
Blood pressure remains well-controlled and his pulse is in the mid 60s as of his 12/23 office visit.  He will continue with full doses of candesartan and doxazosin, also on moderate dose nifedipine and carvedilol.

## 2023-12-13 NOTE — ASSESSMENT & PLAN NOTE
Patient is down from 5.1 to 4.4 as of his 12/23 office visit.  This is without any prescription agents to this regards, certainly nice to see.  Patient is stable to continue full dose ARB.

## 2023-12-20 ENCOUNTER — TELEPHONE (OUTPATIENT)
Dept: UROLOGY | Facility: CLINIC | Age: 78
End: 2023-12-20
Payer: MEDICARE

## 2023-12-20 DIAGNOSIS — Z86.79 HISTORY OF HIGH BLOOD PRESSURE: ICD-10-CM

## 2023-12-20 RX ORDER — CARVEDILOL 12.5 MG/1
12.5 TABLET ORAL 2 TIMES DAILY
Qty: 180 TABLET | Refills: 3 | Status: SHIPPED | OUTPATIENT
Start: 2023-12-20

## 2023-12-20 NOTE — TELEPHONE ENCOUNTER
PATIENT WIFE , ISAIAS PAULINO LISTED ON [facility], WALKED INTO CLINIC TO CX 6 MONTH F/U W/ CHRISSIE ON 4/30/2024.     WIFE STATES THERES NO REASON, JUST ASKED ME TO CX.    ANYTHING ELSE TO DO?

## 2024-01-02 DIAGNOSIS — Z86.79 HISTORY OF HIGH BLOOD PRESSURE: ICD-10-CM

## 2024-01-02 RX ORDER — CARVEDILOL 12.5 MG/1
12.5 TABLET ORAL 2 TIMES DAILY
Qty: 180 TABLET | Refills: 3 | Status: SHIPPED | OUTPATIENT
Start: 2024-01-02

## 2024-01-08 RX ORDER — NIFEDIPINE 90 MG/1
90 TABLET, EXTENDED RELEASE ORAL DAILY
Qty: 90 TABLET | Refills: 1 | Status: SHIPPED | OUTPATIENT
Start: 2024-01-08

## 2024-03-13 DIAGNOSIS — E78.00 HIGH CHOLESTEROL: ICD-10-CM

## 2024-03-13 RX ORDER — ERGOCALCIFEROL 1.25 MG/1
50000 CAPSULE ORAL WEEKLY
Qty: 12 CAPSULE | Refills: 1 | Status: SHIPPED | OUTPATIENT
Start: 2024-03-13

## 2024-03-13 RX ORDER — PRAVASTATIN SODIUM 20 MG
20 TABLET ORAL NIGHTLY
Qty: 90 TABLET | Refills: 1 | Status: SHIPPED | OUTPATIENT
Start: 2024-03-13

## 2024-03-13 RX ORDER — FUROSEMIDE 20 MG/1
10 TABLET ORAL DAILY
Qty: 45 TABLET | Refills: 1 | Status: SHIPPED | OUTPATIENT
Start: 2024-03-13

## 2024-03-19 NOTE — TELEPHONE ENCOUNTER
Patient is needing refill on Finasteride 5 mg   90 day supply 3 refill     [facility]

## 2024-03-20 ENCOUNTER — TELEPHONE (OUTPATIENT)
Dept: INTERNAL MEDICINE | Age: 79
End: 2024-03-20

## 2024-03-20 RX ORDER — FINASTERIDE 5 MG/1
5 TABLET, FILM COATED ORAL DAILY
Qty: 90 TABLET | Refills: 3 | Status: SHIPPED | OUTPATIENT
Start: 2024-03-20

## 2024-03-20 NOTE — TELEPHONE ENCOUNTER
Caller: ISAIAS PAULINO ([facility])    Relationship: Emergency Contact    Best call back number: [phone]    What was the call regarding: PATIENT'S WIFE WOULD LIKE TO SPEAK WITH RHINA IN REGARDS TO A MEDICATION FOR PATIENT.

## 2024-04-15 ENCOUNTER — TRANSCRIBE ORDERS (OUTPATIENT)
Dept: ADMINISTRATIVE | Facility: HOSPITAL | Age: 79
End: 2024-04-15
Payer: MEDICARE

## 2024-04-15 ENCOUNTER — LAB (OUTPATIENT)
Dept: LAB | Facility: HOSPITAL | Age: 79
End: 2024-04-15
Payer: MEDICARE

## 2024-04-15 DIAGNOSIS — E78.5 HYPERLIPIDEMIA, UNSPECIFIED HYPERLIPIDEMIA TYPE: ICD-10-CM

## 2024-04-15 DIAGNOSIS — I10 ESSENTIAL HYPERTENSION, MALIGNANT: ICD-10-CM

## 2024-04-15 DIAGNOSIS — E55.9 AVITAMINOSIS D: ICD-10-CM

## 2024-04-15 DIAGNOSIS — E11.9 DIABETES MELLITUS WITHOUT COMPLICATION: ICD-10-CM

## 2024-04-15 DIAGNOSIS — E11.9 TYPE 2 DIABETES MELLITUS WITHOUT COMPLICATION, WITHOUT LONG-TERM CURRENT USE OF INSULIN: ICD-10-CM

## 2024-04-15 DIAGNOSIS — E78.2 MIXED HYPERLIPIDEMIA: ICD-10-CM

## 2024-04-15 DIAGNOSIS — N18.32 CHRONIC KIDNEY DISEASE (CKD) STAGE G3B/A1, MODERATELY DECREASED GLOMERULAR FILTRATION RATE (GFR) BETWEEN 30-44 ML/MIN/1.73 SQUARE METER AND ALBUMINURIA CREATININE RATIO LESS THAN 30 MG/G (CMS/H*: ICD-10-CM

## 2024-04-15 DIAGNOSIS — N18.32 STAGE 3B CHRONIC KIDNEY DISEASE: ICD-10-CM

## 2024-04-15 DIAGNOSIS — N18.32 CHRONIC KIDNEY DISEASE (CKD) STAGE G3B/A1, MODERATELY DECREASED GLOMERULAR FILTRATION RATE (GFR) BETWEEN 30-44 ML/MIN/1.73 SQUARE METER AND ALBUMINURIA CREATININE RATIO LESS THAN 30 MG/G (CMS/H*: Primary | ICD-10-CM

## 2024-04-15 DIAGNOSIS — D50.9 IRON DEFICIENCY ANEMIA, UNSPECIFIED IRON DEFICIENCY ANEMIA TYPE: ICD-10-CM

## 2024-04-15 LAB
25(OH)D3 SERPL-MCNC: 37.2 NG/ML (ref 30–100)
ALBUMIN SERPL-MCNC: 4.3 G/DL (ref 3.5–5.2)
ALBUMIN/GLOB SERPL: 1.7 G/DL
ALP SERPL-CCNC: 49 U/L (ref 39–117)
ALT SERPL W P-5'-P-CCNC: 18 U/L (ref 1–41)
ANION GAP SERPL CALCULATED.3IONS-SCNC: 11 MMOL/L (ref 5–15)
AST SERPL-CCNC: 13 U/L (ref 1–40)
BACTERIA UR QL AUTO: ABNORMAL /HPF
BASOPHILS # BLD AUTO: 0.06 10*3/MM3 (ref 0–0.2)
BASOPHILS NFR BLD AUTO: 0.8 % (ref 0–1.5)
BILIRUB SERPL-MCNC: 0.3 MG/DL (ref 0–1.2)
BILIRUB UR QL STRIP: NEGATIVE
BUN SERPL-MCNC: 29 MG/DL (ref 8–23)
BUN/CREAT SERPL: 14.9 (ref 7–25)
CALCIUM SPEC-SCNC: 9.6 MG/DL (ref 8.6–10.5)
CHLORIDE SERPL-SCNC: 102 MMOL/L (ref 98–107)
CHOLEST SERPL-MCNC: 151 MG/DL (ref 0–200)
CLARITY UR: ABNORMAL
CO2 SERPL-SCNC: 24 MMOL/L (ref 22–29)
COLOR UR: YELLOW
CREAT SERPL-MCNC: 1.95 MG/DL (ref 0.76–1.27)
CREAT UR-MCNC: 23.5 MG/DL
DEPRECATED RDW RBC AUTO: 44.4 FL (ref 37–54)
EGFRCR SERPLBLD CKD-EPI 2021: 35 ML/MIN/1.73
EOSINOPHIL # BLD AUTO: 0.34 10*3/MM3 (ref 0–0.4)
EOSINOPHIL NFR BLD AUTO: 4.5 % (ref 0.3–6.2)
ERYTHROCYTE [DISTWIDTH] IN BLOOD BY AUTOMATED COUNT: 13.7 % (ref 12.3–15.4)
FERRITIN SERPL-MCNC: 360 NG/ML (ref 30–400)
GLOBULIN UR ELPH-MCNC: 2.5 GM/DL
GLUCOSE SERPL-MCNC: 136 MG/DL (ref 65–99)
GLUCOSE UR STRIP-MCNC: NEGATIVE MG/DL
HBA1C MFR BLD: 7 % (ref 4.8–5.6)
HCT VFR BLD AUTO: 34.3 % (ref 37.5–51)
HDLC SERPL-MCNC: 34 MG/DL (ref 40–60)
HGB BLD-MCNC: 11.2 G/DL (ref 13–17.7)
HGB UR QL STRIP.AUTO: NEGATIVE
HYALINE CASTS UR QL AUTO: ABNORMAL /LPF
IMM GRANULOCYTES # BLD AUTO: 0.02 10*3/MM3 (ref 0–0.05)
IMM GRANULOCYTES NFR BLD AUTO: 0.3 % (ref 0–0.5)
IRON 24H UR-MRATE: 84 MCG/DL (ref 59–158)
IRON SATN MFR SERPL: 25 % (ref 20–50)
KETONES UR QL STRIP: NEGATIVE
LDLC SERPL CALC-MCNC: 84 MG/DL (ref 0–100)
LDLC/HDLC SERPL: 2.31 {RATIO}
LEUKOCYTE ESTERASE UR QL STRIP.AUTO: ABNORMAL
LYMPHOCYTES # BLD AUTO: 2.07 10*3/MM3 (ref 0.7–3.1)
LYMPHOCYTES NFR BLD AUTO: 27.5 % (ref 19.6–45.3)
MCH RBC QN AUTO: 28.8 PG (ref 26.6–33)
MCHC RBC AUTO-ENTMCNC: 32.7 G/DL (ref 31.5–35.7)
MCV RBC AUTO: 88.2 FL (ref 79–97)
MONOCYTES # BLD AUTO: 0.51 10*3/MM3 (ref 0.1–0.9)
MONOCYTES NFR BLD AUTO: 6.8 % (ref 5–12)
NEUTROPHILS NFR BLD AUTO: 4.53 10*3/MM3 (ref 1.7–7)
NEUTROPHILS NFR BLD AUTO: 60.1 % (ref 42.7–76)
NITRITE UR QL STRIP: NEGATIVE
NRBC BLD AUTO-RTO: 0 /100 WBC (ref 0–0.2)
PH UR STRIP.AUTO: 6.5 [PH] (ref 5–8)
PHOSPHATE SERPL-MCNC: 3.5 MG/DL (ref 2.5–4.5)
PLATELET # BLD AUTO: 229 10*3/MM3 (ref 140–450)
PMV BLD AUTO: 10.6 FL (ref 6–12)
POTASSIUM SERPL-SCNC: 4.7 MMOL/L (ref 3.5–5.2)
PROT ?TM UR-MCNC: 67.2 MG/DL
PROT SERPL-MCNC: 6.8 G/DL (ref 6–8.5)
PROT UR QL STRIP: ABNORMAL
PROT/CREAT UR: 2.86 MG/G{CREAT}
PTH-INTACT SERPL-MCNC: 29.6 PG/ML (ref 15–65)
RBC # BLD AUTO: 3.89 10*6/MM3 (ref 4.14–5.8)
RBC # UR STRIP: ABNORMAL /HPF
REF LAB TEST METHOD: ABNORMAL
SODIUM SERPL-SCNC: 137 MMOL/L (ref 136–145)
SP GR UR STRIP: 1.01 (ref 1–1.03)
SQUAMOUS #/AREA URNS HPF: ABNORMAL /HPF
TIBC SERPL-MCNC: 341 MCG/DL (ref 298–536)
TRANSFERRIN SERPL-MCNC: 229 MG/DL (ref 200–360)
TRIGL SERPL-MCNC: 193 MG/DL (ref 0–150)
UROBILINOGEN UR QL STRIP: ABNORMAL
VLDLC SERPL-MCNC: 33 MG/DL (ref 5–40)
WBC # UR STRIP: ABNORMAL /HPF
WBC NRBC COR # BLD AUTO: 7.53 10*3/MM3 (ref 3.4–10.8)

## 2024-04-15 PROCEDURE — 36415 COLL VENOUS BLD VENIPUNCTURE: CPT

## 2024-04-15 PROCEDURE — 82570 ASSAY OF URINE CREATININE: CPT

## 2024-04-15 PROCEDURE — 84100 ASSAY OF PHOSPHORUS: CPT

## 2024-04-15 PROCEDURE — 84466 ASSAY OF TRANSFERRIN: CPT

## 2024-04-15 PROCEDURE — 80053 COMPREHEN METABOLIC PANEL: CPT

## 2024-04-15 PROCEDURE — 85025 COMPLETE CBC W/AUTO DIFF WBC: CPT

## 2024-04-15 PROCEDURE — 82728 ASSAY OF FERRITIN: CPT

## 2024-04-15 PROCEDURE — 82306 VITAMIN D 25 HYDROXY: CPT

## 2024-04-15 PROCEDURE — 84156 ASSAY OF PROTEIN URINE: CPT

## 2024-04-15 PROCEDURE — 83036 HEMOGLOBIN GLYCOSYLATED A1C: CPT

## 2024-04-15 PROCEDURE — 83970 ASSAY OF PARATHORMONE: CPT

## 2024-04-15 PROCEDURE — 80061 LIPID PANEL: CPT

## 2024-04-15 PROCEDURE — 83540 ASSAY OF IRON: CPT

## 2024-04-15 PROCEDURE — 81001 URINALYSIS AUTO W/SCOPE: CPT

## 2024-04-22 ENCOUNTER — OFFICE VISIT (OUTPATIENT)
Dept: INTERNAL MEDICINE | Age: 79
End: 2024-04-22
Payer: MEDICARE

## 2024-04-22 VITALS
WEIGHT: 208.2 LBS | HEART RATE: 67 BPM | SYSTOLIC BLOOD PRESSURE: 148 MMHG | TEMPERATURE: 97.8 F | HEIGHT: 70 IN | OXYGEN SATURATION: 95 % | BODY MASS INDEX: 29.81 KG/M2 | DIASTOLIC BLOOD PRESSURE: 64 MMHG

## 2024-04-22 DIAGNOSIS — Z00.00 MEDICARE ANNUAL WELLNESS VISIT, SUBSEQUENT: Primary | ICD-10-CM

## 2024-04-22 DIAGNOSIS — E55.9 VITAMIN D DEFICIENCY: ICD-10-CM

## 2024-04-22 DIAGNOSIS — N18.32 STAGE 3B CHRONIC KIDNEY DISEASE: ICD-10-CM

## 2024-04-22 DIAGNOSIS — E78.2 MIXED HYPERLIPIDEMIA: ICD-10-CM

## 2024-04-22 DIAGNOSIS — D50.8 IRON DEFICIENCY ANEMIA SECONDARY TO INADEQUATE DIETARY IRON INTAKE: ICD-10-CM

## 2024-04-22 DIAGNOSIS — I10 BENIGN ESSENTIAL HTN: ICD-10-CM

## 2024-04-22 DIAGNOSIS — E11.9 TYPE 2 DIABETES MELLITUS WITHOUT COMPLICATION, WITHOUT LONG-TERM CURRENT USE OF INSULIN: ICD-10-CM

## 2024-04-22 PROCEDURE — G0439 PPPS, SUBSEQ VISIT: HCPCS | Performed by: INTERNAL MEDICINE

## 2024-04-22 PROCEDURE — 1159F MED LIST DOCD IN RCRD: CPT | Performed by: INTERNAL MEDICINE

## 2024-04-22 PROCEDURE — 3078F DIAST BP <80 MM HG: CPT | Performed by: INTERNAL MEDICINE

## 2024-04-22 PROCEDURE — 1170F FXNL STATUS ASSESSED: CPT | Performed by: INTERNAL MEDICINE

## 2024-04-22 PROCEDURE — 99214 OFFICE O/P EST MOD 30 MIN: CPT | Performed by: INTERNAL MEDICINE

## 2024-04-22 PROCEDURE — 1160F RVW MEDS BY RX/DR IN RCRD: CPT | Performed by: INTERNAL MEDICINE

## 2024-04-22 PROCEDURE — 3077F SYST BP >= 140 MM HG: CPT | Performed by: INTERNAL MEDICINE

## 2024-04-22 RX ORDER — NITROGLYCERIN 0.4 MG/1
0.4 TABLET SUBLINGUAL
Qty: 30 TABLET | Refills: 1 | Status: SHIPPED | OUTPATIENT
Start: 2024-04-22

## 2024-04-22 NOTE — ASSESSMENT & PLAN NOTE
Patient's LDL stable at 84 as of his 4/24 OV.  He is just on low-dose pravastatin so we have plenty of room to titrate if needed.

## 2024-04-22 NOTE — ASSESSMENT & PLAN NOTE
Hemoglobin is holding around 11.2, iron studies are fine, this is likely related to his underlying CKD 3, will keep an eye on it going forward.  Of note his erythropoietin level was normal still at 8.6 in 2023, will repeat on return to office later this summer

## 2024-04-22 NOTE — PROGRESS NOTES
The ABCs of the Annual Wellness Visit  Subsequent Medicare Wellness Visit    Subjective      Telly Fitch is a 78 y.o. male who presents for a Subsequent Medicare Wellness Visit.    The following portions of the patient's history were reviewed and   updated as appropriate: allergies, current medications, past family history, past medical history, past social history, past surgical history, and problem list.    Compared to one year ago, the patient feels his physical   health is worse.---> Secondary to progressive fatigue from probable progressive CKD 3B.    Compared to one year ago, the patient feels his mental   health is the same.    Recent Hospitalizations:  He was not admitted to the hospital during the last year.       Current Medical Providers:  Patient Care Team:  Anuel Fisher MD as PCP - General (Internal Medicine)  Angela New APRN as Nurse Practitioner (Urology)    Outpatient Medications Prior to Visit   Medication Sig Dispense Refill    albuterol sulfate  (90 Base) MCG/ACT inhaler As Needed.      ascorbic acid (VITAMIN C) 1000 MG tablet 1 tablet 2 (Two) Times a Day.      aspirin 81 MG chewable tablet aspirin 81 mg oral tablet,chewable chew 1 tablet (81 mg) by oral route once daily   Suspended      candesartan (ATACAND) 32 MG tablet Take 1 tablet by mouth Every Night. 90 tablet 3    carvedilol (COREG) 12.5 MG tablet Take 1 tablet by mouth 2 (Two) Times a Day. 180 tablet 3    dicyclomine (BENTYL) 20 MG tablet As Needed.      doxazosin (CARDURA) 2 MG tablet Take 4 tablets by mouth Daily for 90 days. 360 tablet 3    ergocalciferol (ERGOCALCIFEROL) 1.25 MG (96550 UT) capsule Take 1 capsule by mouth 1 (One) Time Per Week. 12 capsule 1    finasteride (PROSCAR) 5 MG tablet Take 1 tablet by mouth Daily. 90 tablet 3    fluticasone (Flonase) 50 MCG/ACT nasal spray 2 sprays by Each Nare route Daily. 48 g 3    fluticasone-salmeterol (ADVAIR) 250-50 MCG/DOSE DISKUS Inhale 1 puff 2 (Two) Times a  Day.      furosemide (LASIX) 20 MG tablet Take 0.5 tablets by mouth Daily. 45 tablet 1    HYDROcodone-acetaminophen (NORCO) 5-325 MG per tablet Take 1 tablet by mouth Every 6 (Six) Hours As Needed. for pain      loratadine (CLARITIN) 10 MG tablet Take 1 tablet by mouth Daily for 90 days. 90 tablet 1    metFORMIN (GLUCOPHAGE) 500 MG tablet Take 1 tablet by mouth 2 (Two) Times a Day. 180 tablet 3    montelukast (SINGULAIR) 10 MG tablet Take 1 tablet by mouth Every Night. 90 tablet 3    multivitamin (THERAGRAN) tablet tablet Take  by mouth Daily.      NIFEdipine XL (PROCARDIA XL) 90 MG 24 hr tablet Take 1 tablet by mouth Daily. 90 tablet 1    nitroglycerin (Nitrostat) 0.4 MG SL tablet Place 1 tablet under the tongue Every 5 (Five) Minutes As Needed for Chest Pain. Take no more than 3 doses in 15 minutes. 1 tablet 1    omeprazole (PrilOSEC) 20 MG capsule Take 1 capsule by mouth Daily. 90 capsule 3    pravastatin (PRAVACHOL) 20 MG tablet Take 1 tablet by mouth Every Night. 90 tablet 1    Red Yeast Rice Extract 600 MG capsule Take 1 tablet by mouth.      sodium bicarbonate 650 MG tablet Take 1 tablet by mouth 3 (Three) Times a Day.      tolterodine LA (DETROL LA) 2 MG 24 hr capsule Take 2 capsules by mouth Daily. 180 capsule 3    vitamin B-12 (CYANOCOBALAMIN) 1000 MCG tablet Take 1 tablet by mouth Daily. 90 tablet 3    vitamin E 400 UNIT capsule Take 1 capsule by mouth Daily.      Zinc 50 MG tablet Take  by mouth Daily.      SITagliptin (JANUVIA) 25 MG tablet Take 1 tablet by mouth Daily for 90 days. (Patient not taking: Reported on 4/22/2024) 90 tablet 1     No facility-administered medications prior to visit.       Opioid medication/s are on active medication list.  and I have evaluated his active treatment plan and pain score trends (see table).  There were no vitals filed for this visit.  I have reviewed the chart for potential of high risk medication and harmful drug interactions in the elderly.          Aspirin is  "on active medication list. Aspirin use is indicated based on review of current medical condition/s. Pros and cons of this therapy have been discussed today. Benefits of this medication outweigh potential harm.  Patient has been encouraged to continue taking this medication.  .      Patient Active Problem List   Diagnosis    Benign essential HTN    Radiculopathy due to lumbar intervertebral disc disorder    Obstructive sleep apnea    Mixed hyperlipidemia    Iron deficiency anemia secondary to inadequate dietary iron intake    Type 2 diabetes mellitus without complication, without long-term current use of insulin    History of peptic ulcer disease    Vitamin D deficiency    Coronary arteriosclerosis    Hyperkalemia    Mild intermittent asthma without complication    Stage 3b chronic kidney disease    Medicare annual wellness visit, subsequent    Generalized weakness    Diarrhea    Blood in stool    Continuous leakage     Advance Care Planning   Advance Care Planning     Advance Directive is on file---> but family reports that this is not accurate and not to be followed.     Objective    Vitals:    04/22/24 1239   BP: 148/64   Pulse: 67   Temp: 97.8 °F (36.6 °C)   TempSrc: Skin   SpO2: 95%   Weight: 94.4 kg (208 lb 3.2 oz)   Height: 177.8 cm (70\")     Estimated body mass index is 29.87 kg/m² as calculated from the following:    Height as of this encounter: 177.8 cm (70\").    Weight as of this encounter: 94.4 kg (208 lb 3.2 oz).    BMI is >= 25 and <30. (Overweight) The following options were offered after discussion;: exercise counseling/recommendations and nutrition counseling/recommendations      Does the patient have evidence of cognitive impairment?   No    Lab Results   Component Value Date    TRIG 193 (H) 04/15/2024    HDL 34 (L) 04/15/2024    LDL 84 04/15/2024    VLDL 33 04/15/2024    HGBA1C 7.00 (H) 04/15/2024          HEALTH RISK ASSESSMENT    Smoking Status:  Social History     Tobacco Use   Smoking Status " Never    Passive exposure: Never   Smokeless Tobacco Never     Alcohol Consumption:  Social History     Substance and Sexual Activity   Alcohol Use Never     Fall Risk Screen:    EMELINA Fall Risk Assessment was completed, and patient is at LOW risk for falls.Assessment completed on:2024    Depression Screenin/22/2024    12:37 PM   PHQ-2/PHQ-9 Depression Screening   Little Interest or Pleasure in Doing Things 0-->not at all   Feeling Down, Depressed or Hopeless 0-->not at all   PHQ-9: Brief Depression Severity Measure Score 0       Health Habits and Functional and Cognitive Screenin/22/2024    12:00 PM   Functional & Cognitive Status   Do you have difficulty preparing food and eating? No   Do you have difficulty bathing yourself, getting dressed or grooming yourself? No   Do you have difficulty using the toilet? No   Do you have difficulty moving around from place to place? No   Do you have trouble with steps or getting out of a bed or a chair? No   Current Diet Well Balanced Diet   Do you need help using the phone?  No   Are you deaf or do you have serious difficulty hearing?  No   Do you need help to go to places out of walking distance? No   Do you need help shopping? No   Do you need help preparing meals?  No   Do you need help with housework?  No   Do you need help with laundry? No   Do you need help taking your medications? No   Do you need help managing money? No   Do you ever drive or ride in a car without wearing a seat belt? No   Do you have difficulty concentrating, remembering or making decisions? No       Age-appropriate Screening Schedule:  Refer to the list below for future screening recommendations based on patient's age, sex and/or medical conditions. Orders for these recommended tests are listed in the plan section. The patient has been provided with a written plan.    Health Maintenance   Topic Date Due    RSV Vaccine - Adults (1 - 1-dose 60+ series) Never done    ANNUAL  WELLNESS VISIT  04/21/2024    BMI FOLLOWUP  04/21/2024    INFLUENZA VACCINE  08/01/2024    HEMOGLOBIN A1C  10/15/2024    DIABETIC EYE EXAM  11/20/2024    LIPID PANEL  04/15/2025    URINE MICROALBUMIN  04/15/2025    TDAP/TD VACCINES (2 - Td or Tdap) 12/14/2028    HEPATITIS C SCREENING  Completed    Pneumococcal Vaccine 65+  Completed    Hepatitis B  Aged Out    COVID-19 Vaccine  Discontinued    ZOSTER VACCINE  Discontinued    COLORECTAL CANCER SCREENING  Discontinued                  CMS Preventative Services Quick Reference  Risk Factors Identified During Encounter:    None Identified    The above risks/problems have been discussed with the patient.  Pertinent information has been shared with the patient in the After Visit Summary.    Diagnoses and all orders for this visit:    1. Medicare annual wellness visit, subsequent (Primary)  Assessment & Plan:  AWV was completed 4/24.  Patient remains active and independent, no falls no hospitalizations past year.  His wife is his medical POA, if she is unable to speak for him,   their son Telly Tariq would be the next in line    Orders:  -     T3, Free; Future  -     TSH; Future  -     T4, free; Future    2. Type 2 diabetes mellitus without complication, without long-term current use of insulin  Assessment & Plan:  A1c is down from 7.4 to 7.0 as of his 4/24 OV.  We had planned on starting 25 of Januvia back in December, patient was hesitant to do this given his renal issues, and I am glad he did not since A1c is back to goal.    As far as going forward, he will continue with the 500 twice a day of metformin, but will discuss with nephrology tomorrow whether to do so or whether to switch to Januvia and put metformin to the side.    Orders:  -     Hemoglobin A1c; Future    3. Benign essential HTN  Assessment & Plan:  Blood pressure stable and his pulse is in the mid 60s as of his 4/24 OV.  He can continue with full doses of candesartan and doxazosin, moderate to high dose of  nifedipine, as well as moderate dose of carvedilol and just low-dose Lasix.    Orders:  -     Basic Metabolic Panel; Future    4. Stage 3b chronic kidney disease  Assessment & Plan:  GFR stable at 35 versus 12/23 readings.  His electrolytes are fine, no acidosis.  He has appointment with nephrology tomorrow, will see what their recommendations are.    Orders:  -     Erythropoietin; Future    5. Mixed hyperlipidemia  Assessment & Plan:  Patient's LDL stable at 84 as of his 4/24 OV.  He is just on low-dose pravastatin so we have plenty of room to titrate if needed.      6. Vitamin D deficiency  Assessment & Plan:  Vitamin D is stable at 37 as of 4/24, this is on weekly prescription dosing, continue same.      7. Iron deficiency anemia secondary to inadequate dietary iron intake  Assessment & Plan:  Hemoglobin is holding around 11.2, iron studies are fine, this is likely related to his underlying CKD 3, will keep an eye on it going forward.  Of note his erythropoietin level was normal still at 8.6 in 2023, will repeat on return to office later this summer          Follow Up:   Next Medicare Wellness visit to be scheduled in 1 year.      An After Visit Summary and PPPS were made available to the patient.

## 2024-04-22 NOTE — ASSESSMENT & PLAN NOTE
AWV was completed 4/24.  Patient remains active and independent, no falls no hospitalizations past year.  His wife is his medical POA, if she is unable to speak for him,   their son Telly Tariq would be the next in line

## 2024-04-22 NOTE — ASSESSMENT & PLAN NOTE
A1c is down from 7.4 to 7.0 as of his 4/24 OV.  We had planned on starting 25 of Januvia back in December, patient was hesitant to do this given his renal issues, and I am glad he did not since A1c is back to goal.    As far as going forward, he will continue with the 500 twice a day of metformin, but will discuss with nephrology tomorrow whether to do so or whether to switch to Januvia and put metformin to the side.

## 2024-04-22 NOTE — ASSESSMENT & PLAN NOTE
Blood pressure stable and his pulse is in the mid 60s as of his 4/24 OV.  He can continue with full doses of candesartan and doxazosin, moderate to high dose of nifedipine, as well as moderate dose of carvedilol and just low-dose Lasix.

## 2024-04-22 NOTE — PROGRESS NOTES
"Chief Complaint  Diabetes (He hasn't started Januvia 25mg yet, they want to talk about this. ) and Follow-up (Pt states that this is routine, he had labs. /He had a mole procedure last Thursday and they would like this looked at, he feels that it is looking a little infected. )    Subjective          Telly Fitch presents to [facility]     Diabetes  Pertinent negatives for hypoglycemia include no confusion. Pertinent negatives for diabetes include no blurred vision, no chest pain and no fatigue.     History of present illness:  Patient pleasant 78-year-old male with underlying hypertension, hyperlipidemia, chronic kidney disease stage 3a, as well as diabetes mellitus, who is coming in 4/24 for routine 4-month follow-up.  We will review his labs, address any new concerns, and make new recommendations at that time.    Review of Systems   Constitutional:  Negative for appetite change, fatigue and fever.   HENT:  Negative for congestion and ear pain.    Eyes:  Negative for blurred vision.   Respiratory:  Negative for cough, chest tightness, shortness of breath and wheezing.    Cardiovascular:  Negative for chest pain, palpitations and leg swelling.   Gastrointestinal:  Negative for abdominal pain.   Genitourinary:  Negative for difficulty urinating, dysuria and hematuria.   Musculoskeletal:  Negative for arthralgias and gait problem.   Skin:  Negative for skin lesions.   Neurological:  Negative for syncope, memory problem and confusion.   Psychiatric/Behavioral:  Negative for self-injury and depressed mood.        Objective   Vital Signs:   /64   Pulse 67   Temp 97.8 °F (36.6 °C) (Skin)   Ht 177.8 cm (70\")   Wt 94.4 kg (208 lb 3.2 oz)   SpO2 95%   BMI 29.87 kg/m²           Physical Exam  Vitals and nursing note reviewed.   Constitutional:       General: He is not in acute distress.     Appearance: Normal appearance. He is not toxic-appearing.   HENT:      Head: " Atraumatic.      Right Ear: External ear normal.      Left Ear: External ear normal.      Nose: Nose normal.      Mouth/Throat:      Mouth: Mucous membranes are moist.   Eyes:      General:         Right eye: No discharge.         Left eye: No discharge.      Extraocular Movements: Extraocular movements intact.      Pupils: Pupils are equal, round, and reactive to light.   Cardiovascular:      Rate and Rhythm: Normal rate and regular rhythm.      Pulses: Normal pulses.      Heart sounds: Normal heart sounds. No murmur heard.     No gallop.   Pulmonary:      Effort: Pulmonary effort is normal. No respiratory distress.      Breath sounds: No wheezing, rhonchi or rales.   Abdominal:      General: There is no distension.      Palpations: Abdomen is soft. There is no mass.      Tenderness: There is no abdominal tenderness. There is no guarding.   Musculoskeletal:         General: No swelling or tenderness.      Cervical back: No tenderness.      Right lower leg: No edema.      Left lower leg: No edema.   Skin:     General: Skin is warm and dry.      Findings: No rash.   Neurological:      General: No focal deficit present.      Mental Status: He is alert and oriented to person, place, and time. Mental status is at baseline.      Motor: No weakness.      Gait: Gait normal.   Psychiatric:         Mood and Affect: Mood normal.         Thought Content: Thought content normal.          Result Review :   The following data was reviewed by: Anuel Fisher MD on 09/16/2021:                  Assessment and Plan    Diagnoses and all orders for this visit:    1. Medicare annual wellness visit, subsequent (Primary)  Assessment & Plan:  AWV was completed 4/24.  Patient remains active and independent, no falls no hospitalizations past year.  His wife is his medical POA, if she is unable to speak for him,   their son Telly Tariq would be the next in line    Orders:  -     T3, Free; Future  -     TSH; Future  -     T4, free; Future    2.  Type 2 diabetes mellitus without complication, without long-term current use of insulin  Overview:  SGLT2 inhibitors are contraindicated, patient has urinary incontinence, followed by urology.    Assessment & Plan:  A1c is down from 7.4 to 7.0 as of his 4/24 OV.  We had planned on starting 25 of Januvia back in December, patient was hesitant to do this given his renal issues, and I am glad he did not since A1c is back to goal.    As far as going forward, he will continue with the 500 twice a day of metformin, but will discuss with nephrology tomorrow whether to do so or whether to switch to Januvia and put metformin to the side.    Orders:  -     Hemoglobin A1c; Future    3. Benign essential HTN  Assessment & Plan:  Blood pressure stable and his pulse is in the mid 60s as of his 4/24 OV.  He can continue with full doses of candesartan and doxazosin, moderate to high dose of nifedipine, as well as moderate dose of carvedilol and just low-dose Lasix.    Orders:  -     Basic Metabolic Panel; Future    4. Stage 3b chronic kidney disease  Assessment & Plan:  GFR stable at 35 versus 12/23 readings.  His electrolytes are fine, no acidosis.  He has appointment with nephrology tomorrow, will see what their recommendations are.    Orders:  -     Erythropoietin; Future    5. Mixed hyperlipidemia  Assessment & Plan:  Patient's LDL stable at 84 as of his 4/24 OV.  He is just on low-dose pravastatin so we have plenty of room to titrate if needed.      6. Vitamin D deficiency  Assessment & Plan:  Vitamin D is stable at 37 as of 4/24, this is on weekly prescription dosing, continue same.      7. Iron deficiency anemia secondary to inadequate dietary iron intake  Overview:  COLON/EGD 4/18...polyps x1...f/u per Dr Duarte...capsule endo neg '19.      Assessment & Plan:  Hemoglobin is holding around 11.2, iron studies are fine, this is likely related to his underlying CKD 3, will keep an eye on it going forward.  Of note his  erythropoietin level was normal still at 8.6 in 2023, will repeat on return to office later this summer               --  --  OLDER NOTES:  VISIT 5/21:  --  HTN typically controlled and please check at home...HCTZ added since last OV; currently high in AM, ? related to CPAP he quit using; will change flomax to cardura and titrate; if no benefit, then will need to get back on CPAP...try 12.5 HCTZ given urinary c/o...seeing Dr Barrera and I rec 24-hr BP monitor and then review results with him on RTO ( ? related to not being on CPAP)...better 8/18, but will try off HCTZ given incont and increase cardura...has not needed PRN clonidine given at 1/19 urgent visit; stable 2/19...up 3/20 and will see if RYR helps=wife thinks it will...145/70 at home=bring wrist cuff on RTO...his cuff reads about 10 pts high top/bottom, but his numbers are up more at home as well; will max out cardura 9/20---> wel controlled 5/21.  CKD3a stable=1.6 (42%) and d/w no nsaids...stable 10/16 = 44%...54% nice...42% ? realted to recent stone tx...46% is holding...53%...48%...60% is nice to see as of 9/20 OV...55% is fine 1/21---> 42% is a bit concerning b/c of proteinuria, but not too far off his norm.  --  LIPIDS...he's maxed out as far as TG's go, but ; rec add fish oil...TG's down with wt loss and/or fish oil... and defer changes...110...99...114 ok for now 2/19...109 in 3/20 and will stop Tricor and try RYR with the statin...TG's 250 off tricor and on RYR; LDL only 70, so no changes needed---> 80 in 1/21.  CP is vague and has appt with cards next week or two...S/P CATH 4/13 with no signif lesion.  --  DM up some to 6.8, but no change meds needed...ditto for 7.0 after holidays...6.0 with wt loss, so lower amaryl to qd...5.5 so stop it...6.8 so resume 1/2 of 2 mg tab in AM only...he was on 1/2 bid up until just a week ago; A1C 6.0, so ok to stay on 1/2 bid...6.3...6.1...5.9 and rec only take 1/2 in AM...6.4 is fine and will stop it  on RTO if same...6.0 and will stop the 2 mg of amaryl now=2/19...6.8...6.3 is better ballpark off it---> 6.3 is same in 5/21.  MICRO-ALB = 140 at 2/18 OV; on max ARB/CCB, so will just monitor for now---> was 500 in '20 and now is 5000 in 5/21 = to RENAL and I will get U/S.  MORBID OBESITY down 25 past 6 mo to 226 at 6/16 OV--->225 holding.  --  PULM NODULES 11/14 (former tobacco screening)...no change 6/15 with f/u needed...LLL 5 mm is stable many years, but RUL 7 mm needs one more...neg and no further rec.  --  UGI BLEED=PUD s/p inject...Dr Duarte 12/12 with neg EGD...Hgb recovered...12.5 so resume iron...13.1 better and likely== CKD...12.7 is fine...13 +  FE DEF ANEMIA and I rec lower to qd for ferritin of 310 at 10/17 OV...12.2 and ferritin better, so stay on qd...had scopes and has f/u with Dr Duarte for path and labs as of 5/18 OV...12.2... 12.7 and ok to stay on for now...12.1 with stable iron is ok...11.4 is wrong direction, so will ask Dr Arguello to eval...she has him on more iron and B12; had capsule endoscopy neg '19 as well=defer to Dr Arguello---> 12.7 in 1/21 with stable iron.  LFT's with mild bump that Dr Duarte noted as well...wnl...still wnl, but agree with checking Hep C... Hep B/C neg.  GERD w/o dysphagia.  --  A.R.....on zyrtec/nasonex/singulair, and sees Dr Charles...had CT per Dr Mosqueda (ENT).  RAD w/o flare.   RUPESH on CPAP @ 15 cmH2O...quit using it past year b/c issues with getting parts?  --  URGENT VISIT 7/19 and 11/20:  R LEG PAIN=twisted knee a few weeks ago, and then fell in hole yesterday; throbs at rest, and painful to walk on; no swelling in knee, and actually says pain is moreso in the hip; decent ROM, but has point tenderness in the greater trochanter; per orders and call Monday.  LBP into L LEG=I d/w him it's not the hip=saw Dr Been last month; no change bowel/bladder; is tender L greater trochanter, neg SLR; will get into PTA for L hip and LBP and add gabapentin/medrol...sxs resolved with  just gabapentin and ? with passing kidney stone=d/w try weaning off it now...sxs in L hand and in L leg as of 3/20 OV that may be overuse syndrome=laying tile/etc for past 3 months; will get NCS given weak  and refer to Dr Randhawa again if worse.  --  KIDNEY STONES per a Dr Benavidez.  BPH/OAB per Urology in [place].  --  PSA 0.3 (7/11/12)...defer  COLON/EGD 6/23 = 5 polyps = 3 years per Dr. Campos.  (, my pt, 2 children)    Follow Up   Return in about 4 months (around 8/22/2024).  Patient was given instructions and counseling regarding his condition or for health maintenance advice. Please see specific information pulled into the AVS if appropriate.

## 2024-04-22 NOTE — ASSESSMENT & PLAN NOTE
GFR stable at 35 versus 12/23 readings.  His electrolytes are fine, no acidosis.  He has appointment with nephrology tomorrow, will see what their recommendations are.

## 2024-05-07 RX ORDER — DOXAZOSIN 2 MG/1
8 TABLET ORAL EVERY 24 HOURS
Qty: 360 TABLET | Refills: 3 | Status: SHIPPED | OUTPATIENT
Start: 2024-05-07

## 2024-05-13 ENCOUNTER — TELEPHONE (OUTPATIENT)
Dept: INTERNAL MEDICINE | Age: 79
End: 2024-05-13
Payer: MEDICARE

## 2024-05-13 NOTE — TELEPHONE ENCOUNTER
Below is my note from his April appointment.  I do not see where we wrote for Jardiance, so I am fairly certain this came from nephrology.  They like Jardiance, and obviously if patient would have tolerated this, would have been beneficial to the kidneys in the long-term.  As far as whether he can go back on metformin, he will have to get clearance from them.  There are several other medicines we can try instead of the Jardiance and metformin, specifically the Januvia I was consider using, and/or glimepiride etc.     Type 2 diabetes mellitus without complication, without long-term current use of insulin  Assessment & Plan:  A1c is down from 7.4 to 7.0 as of his 4/24 OV.  We had planned on starting 25 of Januvia back in December, patient was hesitant to do this given his renal issues, and I am glad he did not since A1c is back to goal.     As far as going forward, he will continue with the 500 twice a day of metformin, but will discuss with nephrology tomorrow whether to do so or whether to switch to Januvia and put metformin to the side.

## 2024-05-13 NOTE — TELEPHONE ENCOUNTER
Pt had an allergic reaction to Jardiance, he has stopped taking it, he was seen at [facility].     He is wanting to know when he can go back on the Metformin.

## 2024-05-28 ENCOUNTER — LAB (OUTPATIENT)
Dept: LAB | Facility: HOSPITAL | Age: 79
End: 2024-05-28
Payer: MEDICARE

## 2024-05-28 ENCOUNTER — TRANSCRIBE ORDERS (OUTPATIENT)
Dept: LAB | Facility: HOSPITAL | Age: 79
End: 2024-05-28
Payer: MEDICARE

## 2024-05-28 DIAGNOSIS — E78.5 HYPERLIPIDEMIA, UNSPECIFIED HYPERLIPIDEMIA TYPE: ICD-10-CM

## 2024-05-28 DIAGNOSIS — E78.2 MIXED HYPERLIPIDEMIA: ICD-10-CM

## 2024-05-28 DIAGNOSIS — I25.10 DISEASE OF CARDIOVASCULAR SYSTEM: ICD-10-CM

## 2024-05-28 DIAGNOSIS — R60.1 GENERALIZED EDEMA: ICD-10-CM

## 2024-05-28 DIAGNOSIS — I10 HYPERTENSION, ESSENTIAL: ICD-10-CM

## 2024-05-28 DIAGNOSIS — N18.32 CHRONIC KIDNEY DISEASE (CKD) STAGE G3B/A1, MODERATELY DECREASED GLOMERULAR FILTRATION RATE (GFR) BETWEEN 30-44 ML/MIN/1.73 SQUARE METER AND ALBUMINURIA CREATININE RATIO LESS THAN 30 MG/G (CMS/H*: Primary | ICD-10-CM

## 2024-05-28 DIAGNOSIS — E11.22 TYPE 2 DIABETES MELLITUS WITH DIABETIC CHRONIC KIDNEY DISEASE, UNSPECIFIED CKD STAGE, UNSPECIFIED WHETHER LONG TERM INSULIN USE: ICD-10-CM

## 2024-05-28 DIAGNOSIS — E11.9 DIABETES MELLITUS WITHOUT COMPLICATION: ICD-10-CM

## 2024-05-28 DIAGNOSIS — N18.32 CHRONIC KIDNEY DISEASE (CKD) STAGE G3B/A1, MODERATELY DECREASED GLOMERULAR FILTRATION RATE (GFR) BETWEEN 30-44 ML/MIN/1.73 SQUARE METER AND ALBUMINURIA CREATININE RATIO LESS THAN 30 MG/G (CMS/H*: ICD-10-CM

## 2024-05-28 DIAGNOSIS — E55.9 VITAMIN D DEFICIENCY: ICD-10-CM

## 2024-05-28 LAB
ALBUMIN SERPL-MCNC: 3.9 G/DL (ref 3.5–5.2)
ANION GAP SERPL CALCULATED.3IONS-SCNC: 14.2 MMOL/L (ref 5–15)
BACTERIA UR QL AUTO: ABNORMAL /HPF
BASOPHILS # BLD AUTO: 0.05 10*3/MM3 (ref 0–0.2)
BASOPHILS NFR BLD AUTO: 0.5 % (ref 0–1.5)
BILIRUB UR QL STRIP: NEGATIVE
BUN SERPL-MCNC: 43 MG/DL (ref 8–23)
BUN/CREAT SERPL: 20.3 (ref 7–25)
CALCIUM SPEC-SCNC: 9.5 MG/DL (ref 8.6–10.5)
CHLORIDE SERPL-SCNC: 105 MMOL/L (ref 98–107)
CLARITY UR: ABNORMAL
CO2 SERPL-SCNC: 19.8 MMOL/L (ref 22–29)
COLOR UR: YELLOW
CREAT SERPL-MCNC: 2.12 MG/DL (ref 0.76–1.27)
CREAT UR-MCNC: 114.4 MG/DL
DEPRECATED RDW RBC AUTO: 41.4 FL (ref 37–54)
EGFRCR SERPLBLD CKD-EPI 2021: 31.7 ML/MIN/1.73
EOSINOPHIL # BLD AUTO: 0.25 10*3/MM3 (ref 0–0.4)
EOSINOPHIL NFR BLD AUTO: 2.7 % (ref 0.3–6.2)
ERYTHROCYTE [DISTWIDTH] IN BLOOD BY AUTOMATED COUNT: 13 % (ref 12.3–15.4)
GLUCOSE SERPL-MCNC: 160 MG/DL (ref 65–99)
GLUCOSE UR STRIP-MCNC: NEGATIVE MG/DL
HCT VFR BLD AUTO: 34.7 % (ref 37.5–51)
HGB BLD-MCNC: 11.3 G/DL (ref 13–17.7)
HGB UR QL STRIP.AUTO: NEGATIVE
HYALINE CASTS UR QL AUTO: ABNORMAL /LPF
IMM GRANULOCYTES # BLD AUTO: 0.03 10*3/MM3 (ref 0–0.05)
IMM GRANULOCYTES NFR BLD AUTO: 0.3 % (ref 0–0.5)
KETONES UR QL STRIP: NEGATIVE
LEUKOCYTE ESTERASE UR QL STRIP.AUTO: ABNORMAL
LYMPHOCYTES # BLD AUTO: 2.35 10*3/MM3 (ref 0.7–3.1)
LYMPHOCYTES NFR BLD AUTO: 25.5 % (ref 19.6–45.3)
MCH RBC QN AUTO: 28.5 PG (ref 26.6–33)
MCHC RBC AUTO-ENTMCNC: 32.6 G/DL (ref 31.5–35.7)
MCV RBC AUTO: 87.6 FL (ref 79–97)
MONOCYTES # BLD AUTO: 0.48 10*3/MM3 (ref 0.1–0.9)
MONOCYTES NFR BLD AUTO: 5.2 % (ref 5–12)
NEUTROPHILS NFR BLD AUTO: 6.05 10*3/MM3 (ref 1.7–7)
NEUTROPHILS NFR BLD AUTO: 65.8 % (ref 42.7–76)
NITRITE UR QL STRIP: NEGATIVE
NRBC BLD AUTO-RTO: 0 /100 WBC (ref 0–0.2)
PH UR STRIP.AUTO: 5.5 [PH] (ref 5–8)
PHOSPHATE SERPL-MCNC: 4.3 MG/DL (ref 2.5–4.5)
PLATELET # BLD AUTO: 379 10*3/MM3 (ref 140–450)
PMV BLD AUTO: 10 FL (ref 6–12)
POTASSIUM SERPL-SCNC: 4.6 MMOL/L (ref 3.5–5.2)
PROT ?TM UR-MCNC: 231.6 MG/DL
PROT UR QL STRIP: ABNORMAL
PROT/CREAT UR: 2.02 MG/G{CREAT}
RBC # BLD AUTO: 3.96 10*6/MM3 (ref 4.14–5.8)
RBC # UR STRIP: ABNORMAL /HPF
REF LAB TEST METHOD: ABNORMAL
SODIUM SERPL-SCNC: 139 MMOL/L (ref 136–145)
SP GR UR STRIP: 1.02 (ref 1–1.03)
SQUAMOUS #/AREA URNS HPF: ABNORMAL /HPF
UROBILINOGEN UR QL STRIP: ABNORMAL
WBC # UR STRIP: ABNORMAL /HPF
WBC NRBC COR # BLD AUTO: 9.21 10*3/MM3 (ref 3.4–10.8)

## 2024-05-28 PROCEDURE — 80069 RENAL FUNCTION PANEL: CPT

## 2024-05-28 PROCEDURE — 84156 ASSAY OF PROTEIN URINE: CPT

## 2024-05-28 PROCEDURE — 81001 URINALYSIS AUTO W/SCOPE: CPT

## 2024-05-28 PROCEDURE — 82570 ASSAY OF URINE CREATININE: CPT

## 2024-05-28 PROCEDURE — 36415 COLL VENOUS BLD VENIPUNCTURE: CPT

## 2024-05-28 PROCEDURE — 85025 COMPLETE CBC W/AUTO DIFF WBC: CPT

## 2024-06-12 ENCOUNTER — TRANSCRIBE ORDERS (OUTPATIENT)
Dept: ADMINISTRATIVE | Facility: HOSPITAL | Age: 79
End: 2024-06-12
Payer: MEDICARE

## 2024-06-12 DIAGNOSIS — N50.89 SCROTAL SWELLING: ICD-10-CM

## 2024-06-12 DIAGNOSIS — N50.89 EDEMA OF MALE GENITAL ORGANS: Primary | ICD-10-CM

## 2024-06-14 ENCOUNTER — OFFICE VISIT (OUTPATIENT)
Dept: INTERNAL MEDICINE | Age: 79
End: 2024-06-14
Payer: MEDICARE

## 2024-06-14 VITALS
OXYGEN SATURATION: 96 % | SYSTOLIC BLOOD PRESSURE: 132 MMHG | DIASTOLIC BLOOD PRESSURE: 60 MMHG | HEART RATE: 46 BPM | BODY MASS INDEX: 29.35 KG/M2 | HEIGHT: 70 IN | WEIGHT: 205 LBS | TEMPERATURE: 97.7 F

## 2024-06-14 DIAGNOSIS — I10 BENIGN ESSENTIAL HTN: ICD-10-CM

## 2024-06-14 DIAGNOSIS — N18.32 STAGE 3B CHRONIC KIDNEY DISEASE: ICD-10-CM

## 2024-06-14 DIAGNOSIS — E11.9 TYPE 2 DIABETES MELLITUS WITHOUT COMPLICATION, WITHOUT LONG-TERM CURRENT USE OF INSULIN: Primary | ICD-10-CM

## 2024-06-14 PROCEDURE — 99214 OFFICE O/P EST MOD 30 MIN: CPT | Performed by: INTERNAL MEDICINE

## 2024-06-14 PROCEDURE — 1159F MED LIST DOCD IN RCRD: CPT | Performed by: INTERNAL MEDICINE

## 2024-06-14 PROCEDURE — 3075F SYST BP GE 130 - 139MM HG: CPT | Performed by: INTERNAL MEDICINE

## 2024-06-14 PROCEDURE — 1160F RVW MEDS BY RX/DR IN RCRD: CPT | Performed by: INTERNAL MEDICINE

## 2024-06-14 PROCEDURE — 3078F DIAST BP <80 MM HG: CPT | Performed by: INTERNAL MEDICINE

## 2024-06-14 RX ORDER — GLIMEPIRIDE 1 MG/1
1 TABLET ORAL
Qty: 90 TABLET | Refills: 1 | Status: SHIPPED | OUTPATIENT
Start: 2024-06-14

## 2024-06-14 NOTE — ASSESSMENT & PLAN NOTE
Blood pressure is well-controlled as of his 6/24 urgent visit.  He is on full doses of nifedipine, candesartan, and doxazosin, as well as moderate dose carvedilol and just low-dose Lasix.  His potassium is 4.6 with labs from just about 2 weeks ago.  His pulse is around 50 presently, we typically had him in the 70 ballpark here in the office, he will keep an eye on it at home and let us know if it trends down there as well.  Otherwise stable to continue current plan of care.

## 2024-06-14 NOTE — ASSESSMENT & PLAN NOTE
Patient was tried on Jardiance by nephrology since his last office visit, unfortunately he had severe rash and swelling in his groin, it was discontinued.  He was given this in place of metformin.  His GFR is in the low 30s on average, we would not want to use metformin if he trended below this, so we will go ahead and start just very low-dose glimepiride.  Patient is very consistent with his meals, so I do not think he will have any problems with this.

## 2024-06-14 NOTE — PROGRESS NOTES
"Chief Complaint  Diabetes (Pt has not been on Jardiance since 5/12/2024. He had a reaction in his private area, he went to ER. He has a log of his BS.)    Subjective          Telly Fitch presents to [facility]     Diabetes  Pertinent negatives for hypoglycemia include no confusion. Pertinent negatives for diabetes include no blurred vision, no chest pain and no fatigue.     History of present illness:  Patient pleasant 78-year-old male with underlying hypertension, hyperlipidemia, chronic kidney disease stage 3a, as well as diabetes mellitus, who is coming in 4/24 for routine 4-month follow-up.  We will review his labs, address any new concerns, and make new recommendations at that time.---> Patient being seen early in 6/24 regards to elevated blood pressures off of Jardiance secondary to genital rash.    Review of Systems   Constitutional:  Negative for appetite change, fatigue and fever.   HENT:  Negative for congestion and ear pain.    Eyes:  Negative for blurred vision.   Respiratory:  Negative for cough, chest tightness, shortness of breath and wheezing.    Cardiovascular:  Negative for chest pain, palpitations and leg swelling.   Gastrointestinal:  Negative for abdominal pain.   Genitourinary:  Negative for difficulty urinating, dysuria and hematuria.   Musculoskeletal:  Negative for arthralgias and gait problem.   Skin:  Negative for skin lesions.   Neurological:  Negative for syncope, memory problem and confusion.   Psychiatric/Behavioral:  Negative for self-injury and depressed mood.        Objective   Vital Signs:   /60   Pulse (!) 46   Temp 97.7 °F (36.5 °C) (Skin)   Ht 177.8 cm (70\")   Wt 93 kg (205 lb)   SpO2 96%   BMI 29.41 kg/m²           Physical Exam  Vitals and nursing note reviewed.   Constitutional:       General: He is not in acute distress.     Appearance: Normal appearance. He is not toxic-appearing.   HENT:      Head: Atraumatic.      " Right Ear: External ear normal.      Left Ear: External ear normal.      Nose: Nose normal.      Mouth/Throat:      Mouth: Mucous membranes are moist.   Eyes:      General:         Right eye: No discharge.         Left eye: No discharge.      Extraocular Movements: Extraocular movements intact.      Pupils: Pupils are equal, round, and reactive to light.   Cardiovascular:      Rate and Rhythm: Normal rate and regular rhythm.      Pulses: Normal pulses.      Heart sounds: Normal heart sounds. No murmur heard.     No gallop.   Pulmonary:      Effort: Pulmonary effort is normal. No respiratory distress.      Breath sounds: No wheezing, rhonchi or rales.   Abdominal:      General: There is no distension.      Palpations: Abdomen is soft. There is no mass.      Tenderness: There is no abdominal tenderness. There is no guarding.   Musculoskeletal:         General: No swelling or tenderness.      Cervical back: No tenderness.      Right lower leg: No edema.      Left lower leg: No edema.   Skin:     General: Skin is warm and dry.      Findings: No rash.   Neurological:      General: No focal deficit present.      Mental Status: He is alert and oriented to person, place, and time. Mental status is at baseline.      Motor: No weakness.      Gait: Gait normal.   Psychiatric:         Mood and Affect: Mood normal.         Thought Content: Thought content normal.          Result Review :   The following data was reviewed by: Anuel Fisher MD on 09/16/2021:                  Assessment and Plan    Diagnoses and all orders for this visit:    1. Type 2 diabetes mellitus without complication, without long-term current use of insulin (Primary)  Overview:  SGLT2 inhibitors are contraindicated, patient has urinary incontinence, followed by urology.    Assessment & Plan:  Patient was tried on Jardiance by nephrology since his last office visit, unfortunately he had severe rash and swelling in his groin, it was discontinued.  He was given  this in place of metformin.  His GFR is in the low 30s on average, we would not want to use metformin if he trended below this, so we will go ahead and start just very low-dose glimepiride.  Patient is very consistent with his meals, so I do not think he will have any problems with this.      2. Benign essential HTN  Assessment & Plan:  Blood pressure is well-controlled as of his 6/24 urgent visit.  He is on full doses of nifedipine, candesartan, and doxazosin, as well as moderate dose carvedilol and just low-dose Lasix.  His potassium is 4.6 with labs from just about 2 weeks ago.  His pulse is around 50 presently, we typically had him in the 70 ballpark here in the office, he will keep an eye on it at home and let us know if it trends down there as well.  Otherwise stable to continue current plan of care.      3. Stage 3b chronic kidney disease  Assessment & Plan:  Most recent GFR was 33, electrolytes were holding.  He has follow-up with nephrology later this month.      Other orders  -     glimepiride (Amaryl) 1 MG tablet; Take 1 tablet by mouth Every Morning Before Breakfast.  Dispense: 90 tablet; Refill: 1               --  --  OLDER NOTES:  VISIT 5/21:  --  HTN typically controlled and please check at home...HCTZ added since last OV; currently high in AM, ? related to CPAP he quit using; will change flomax to cardura and titrate; if no benefit, then will need to get back on CPAP...try 12.5 HCTZ given urinary c/o...seeing Dr Barrera and I rec 24-hr BP monitor and then review results with him on RTO ( ? related to not being on CPAP)...better 8/18, but will try off HCTZ given incont and increase cardura...has not needed PRN clonidine given at 1/19 urgent visit; stable 2/19...up 3/20 and will see if RYR helps=wife thinks it will...145/70 at home=bring wrist cuff on RTO...his cuff reads about 10 pts high top/bottom, but his numbers are up more at home as well; will max out cardura 9/20---> wel controlled  5/21.  CKD3a stable=1.6 (42%) and d/w no nsaids...stable 10/16 = 44%...54% nice...42% ? realted to recent stone tx...46% is holding...53%...48%...60% is nice to see as of 9/20 OV...55% is fine 1/21---> 42% is a bit concerning b/c of proteinuria, but not too far off his norm.  --  LIPIDS...he's maxed out as far as TG's go, but ; rec add fish oil...TG's down with wt loss and/or fish oil... and defer changes...110...99...114 ok for now 2/19...109 in 3/20 and will stop Tricor and try RYR with the statin...TG's 250 off tricor and on RYR; LDL only 70, so no changes needed---> 80 in 1/21.  CP is vague and has appt with cards next week or two...S/P CATH 4/13 with no signif lesion.  --  DM up some to 6.8, but no change meds needed...ditto for 7.0 after holidays...6.0 with wt loss, so lower amaryl to qd...5.5 so stop it...6.8 so resume 1/2 of 2 mg tab in AM only...he was on 1/2 bid up until just a week ago; A1C 6.0, so ok to stay on 1/2 bid...6.3...6.1...5.9 and rec only take 1/2 in AM...6.4 is fine and will stop it on RTO if same...6.0 and will stop the 2 mg of amaryl now=2/19...6.8...6.3 is better ballpark off it---> 6.3 is same in 5/21.  MICRO-ALB = 140 at 2/18 OV; on max ARB/CCB, so will just monitor for now---> was 500 in '20 and now is 5000 in 5/21 = to RENAL and I will get U/S.  MORBID OBESITY down 25 past 6 mo to 226 at 6/16 OV--->225 holding.  --  PULM NODULES 11/14 (former tobacco screening)...no change 6/15 with f/u needed...LLL 5 mm is stable many years, but RUL 7 mm needs one more...neg and no further rec.  --  UGI BLEED=PUD s/p inject...Dr Duarte 12/12 with neg EGD...Hgb recovered...12.5 so resume iron...13.1 better and likely== CKD...12.7 is fine...13 +  FE DEF ANEMIA and I rec lower to qd for ferritin of 310 at 10/17 OV...12.2 and ferritin better, so stay on qd...had scopes and has f/u with Dr Duarte for path and labs as of 5/18 OV...12.2... 12.7 and ok to stay on for now...12.1 with stable  iron is ok...11.4 is wrong direction, so will ask Dr Arguello to eval...she has him on more iron and B12; had capsule endoscopy neg '19 as well=defer to Dr Arguello---> 12.7 in 1/21 with stable iron.  LFT's with mild bump that Dr Duarte noted as well...wnl...still wnl, but agree with checking Hep C... Hep B/C neg.  GERD w/o dysphagia.  --  A.R.....on zyrtec/nasonex/singulair, and sees Dr Charles...had CT per Dr Mosqueda (ENT).  RAD w/o flare.   RUPESH on CPAP @ 15 cmH2O...quit using it past year b/c issues with getting parts?  --  URGENT VISIT 7/19 and 11/20:  R LEG PAIN=twisted knee a few weeks ago, and then fell in hole yesterday; throbs at rest, and painful to walk on; no swelling in knee, and actually says pain is moreso in the hip; decent ROM, but has point tenderness in the greater trochanter; per orders and call Monday.  LBP into L LEG=I d/w him it's not the hip=saw Dr Meredith last month; no change bowel/bladder; is tender L greater trochanter, neg SLR; will get into PTA for L hip and LBP and add gabapentin/medrol...sxs resolved with just gabapentin and ? with passing kidney stone=d/w try weaning off it now...sxs in L hand and in L leg as of 3/20 OV that may be overuse syndrome=laying tile/etc for past 3 months; will get NCS given weak  and refer to Dr Randhawa again if worse.  --  KIDNEY STONES per a Dr Benavidez.  BPH/OAB per Urology in [place].  --  PSA 0.3 (7/11/12)...defer  COLON/EGD 6/23 = 5 polyps = 3 years per Dr. Campos.  (, my pt, 2 children)    Follow Up   Return for Next scheduled follow up.  Patient was given instructions and counseling regarding his condition or for health maintenance advice. Please see specific information pulled into the AVS if appropriate.

## 2024-06-14 NOTE — ASSESSMENT & PLAN NOTE
Most recent GFR was 33, electrolytes were holding.  He has follow-up with nephrology later this month.

## 2024-06-26 ENCOUNTER — HOSPITAL ENCOUNTER (OUTPATIENT)
Dept: ULTRASOUND IMAGING | Facility: HOSPITAL | Age: 79
Discharge: HOME | End: 2024-06-26
Admitting: NURSE PRACTITIONER
Payer: MEDICARE

## 2024-06-26 DIAGNOSIS — N50.89 SCROTAL SWELLING: ICD-10-CM

## 2024-06-26 DIAGNOSIS — N50.89 EDEMA OF MALE GENITAL ORGANS: ICD-10-CM

## 2024-06-26 PROCEDURE — 76870 US EXAM SCROTUM: CPT

## 2024-07-31 DIAGNOSIS — K21.9 MILD ACID REFLUX: ICD-10-CM

## 2024-07-31 RX ORDER — OMEPRAZOLE 20 MG/1
20 CAPSULE, DELAYED RELEASE ORAL DAILY
Qty: 90 CAPSULE | Refills: 3 | Status: SHIPPED | OUTPATIENT
Start: 2024-07-31

## 2024-08-14 ENCOUNTER — LAB (OUTPATIENT)
Dept: INTERNAL MEDICINE | Age: 79
End: 2024-08-14
Payer: MEDICARE

## 2024-08-14 DIAGNOSIS — I10 BENIGN ESSENTIAL HTN: ICD-10-CM

## 2024-08-14 DIAGNOSIS — Z00.00 MEDICARE ANNUAL WELLNESS VISIT, SUBSEQUENT: ICD-10-CM

## 2024-08-14 DIAGNOSIS — N18.32 STAGE 3B CHRONIC KIDNEY DISEASE: ICD-10-CM

## 2024-08-14 DIAGNOSIS — E11.9 TYPE 2 DIABETES MELLITUS WITHOUT COMPLICATION, WITHOUT LONG-TERM CURRENT USE OF INSULIN: ICD-10-CM

## 2024-08-14 LAB
ANION GAP SERPL CALCULATED.3IONS-SCNC: 10 MMOL/L (ref 5–15)
BUN SERPL-MCNC: 48 MG/DL (ref 8–23)
BUN/CREAT SERPL: 19.7 (ref 7–25)
CALCIUM SPEC-SCNC: 9.8 MG/DL (ref 8.6–10.5)
CHLORIDE SERPL-SCNC: 104 MMOL/L (ref 98–107)
CO2 SERPL-SCNC: 23 MMOL/L (ref 22–29)
CREAT SERPL-MCNC: 2.44 MG/DL (ref 0.76–1.27)
EGFRCR SERPLBLD CKD-EPI 2021: 26.7 ML/MIN/1.73
GLUCOSE SERPL-MCNC: 92 MG/DL (ref 65–99)
HBA1C MFR BLD: 6.7 % (ref 4.8–5.6)
POTASSIUM SERPL-SCNC: 5.5 MMOL/L (ref 3.5–5.2)
SODIUM SERPL-SCNC: 137 MMOL/L (ref 136–145)
T3FREE SERPL-MCNC: 2.55 PG/ML (ref 2–4.4)
T4 FREE SERPL-MCNC: 1.2 NG/DL (ref 0.92–1.68)
TSH SERPL DL<=0.05 MIU/L-ACNC: 2.06 UIU/ML (ref 0.27–4.2)

## 2024-08-14 PROCEDURE — 36415 COLL VENOUS BLD VENIPUNCTURE: CPT | Performed by: INTERNAL MEDICINE

## 2024-08-14 PROCEDURE — 80048 BASIC METABOLIC PNL TOTAL CA: CPT | Performed by: INTERNAL MEDICINE

## 2024-08-14 PROCEDURE — 84443 ASSAY THYROID STIM HORMONE: CPT | Performed by: INTERNAL MEDICINE

## 2024-08-14 PROCEDURE — 84481 FREE ASSAY (FT-3): CPT | Performed by: INTERNAL MEDICINE

## 2024-08-14 PROCEDURE — 84439 ASSAY OF FREE THYROXINE: CPT | Performed by: INTERNAL MEDICINE

## 2024-08-14 PROCEDURE — 82668 ASSAY OF ERYTHROPOIETIN: CPT | Performed by: INTERNAL MEDICINE

## 2024-08-14 PROCEDURE — 83036 HEMOGLOBIN GLYCOSYLATED A1C: CPT | Performed by: INTERNAL MEDICINE

## 2024-08-14 RX ORDER — NIFEDIPINE 90 MG/1
90 TABLET, EXTENDED RELEASE ORAL DAILY
Qty: 90 TABLET | Refills: 1 | Status: SHIPPED | OUTPATIENT
Start: 2024-08-14

## 2024-08-15 ENCOUNTER — TELEPHONE (OUTPATIENT)
Dept: INTERNAL MEDICINE | Age: 79
End: 2024-08-15
Payer: MEDICARE

## 2024-08-15 DIAGNOSIS — N18.32 STAGE 3B CHRONIC KIDNEY DISEASE: Primary | ICD-10-CM

## 2024-08-15 LAB — EPO SERPL-ACNC: 10.5 MIU/ML (ref 2.6–18.5)

## 2024-08-15 NOTE — TELEPHONE ENCOUNTER
----- Message from Anuel Fisher sent at 8/15/2024 12:38 PM EDT -----  Please ensure patient is on no prescription nor over-the-counter potassium.  He needs to follow a low potassium diet, no bananas, no oranges, limit potatoes to no more than twice a week, etc.  He needs to cut his candesartan in half until return to office.  Please put in for a renal panel to be done day prior to his office visit.

## 2024-08-15 NOTE — TELEPHONE ENCOUNTER
I have let pt's wife know of this, she voiced understanding, he is not on any Potassium and he is already following a low potassium diet.   I have put orders in for renal panel, please sign order. Thanks.

## 2024-08-20 ENCOUNTER — LAB (OUTPATIENT)
Dept: INTERNAL MEDICINE | Age: 79
End: 2024-08-20
Payer: MEDICARE

## 2024-08-20 DIAGNOSIS — N18.32 STAGE 3B CHRONIC KIDNEY DISEASE: ICD-10-CM

## 2024-08-20 LAB
ALBUMIN SERPL-MCNC: 4.3 G/DL (ref 3.5–5.2)
ANION GAP SERPL CALCULATED.3IONS-SCNC: 13 MMOL/L (ref 5–15)
BUN SERPL-MCNC: 47 MG/DL (ref 8–23)
BUN/CREAT SERPL: 20.3 (ref 7–25)
CALCIUM SPEC-SCNC: 9.7 MG/DL (ref 8.6–10.5)
CHLORIDE SERPL-SCNC: 103 MMOL/L (ref 98–107)
CO2 SERPL-SCNC: 20 MMOL/L (ref 22–29)
CREAT SERPL-MCNC: 2.32 MG/DL (ref 0.76–1.27)
EGFRCR SERPLBLD CKD-EPI 2021: 28.4 ML/MIN/1.73
GLUCOSE SERPL-MCNC: 105 MG/DL (ref 65–99)
PHOSPHATE SERPL-MCNC: 4.3 MG/DL (ref 2.5–4.5)
POTASSIUM SERPL-SCNC: 4.9 MMOL/L (ref 3.5–5.2)
SODIUM SERPL-SCNC: 136 MMOL/L (ref 136–145)

## 2024-08-20 PROCEDURE — 36415 COLL VENOUS BLD VENIPUNCTURE: CPT | Performed by: INTERNAL MEDICINE

## 2024-08-20 PROCEDURE — 80069 RENAL FUNCTION PANEL: CPT | Performed by: INTERNAL MEDICINE

## 2024-08-21 ENCOUNTER — TRANSCRIBE ORDERS (OUTPATIENT)
Dept: ADMINISTRATIVE | Facility: HOSPITAL | Age: 79
End: 2024-08-21
Payer: MEDICARE

## 2024-08-21 ENCOUNTER — TELEPHONE (OUTPATIENT)
Dept: INTERNAL MEDICINE | Age: 79
End: 2024-08-21

## 2024-08-21 ENCOUNTER — LAB (OUTPATIENT)
Dept: LAB | Facility: HOSPITAL | Age: 79
End: 2024-08-21
Payer: MEDICARE

## 2024-08-21 DIAGNOSIS — E55.9 VITAMIN D DEFICIENCY: ICD-10-CM

## 2024-08-21 DIAGNOSIS — N18.6 TYPE 2 DIABETES MELLITUS WITH ESRD (END-STAGE RENAL DISEASE): ICD-10-CM

## 2024-08-21 DIAGNOSIS — N18.32 CHRONIC KIDNEY DISEASE (CKD) STAGE G3B/A1, MODERATELY DECREASED GLOMERULAR FILTRATION RATE (GFR) BETWEEN 30-44 ML/MIN/1.73 SQUARE METER AND ALBUMINURIA CREATININE RATIO LESS THAN 30 MG/G (CMS/H*: ICD-10-CM

## 2024-08-21 DIAGNOSIS — I10 HYPERTENSION, ESSENTIAL: ICD-10-CM

## 2024-08-21 DIAGNOSIS — E78.5 HYPERLIPIDEMIA, UNSPECIFIED HYPERLIPIDEMIA TYPE: ICD-10-CM

## 2024-08-21 DIAGNOSIS — E11.22 TYPE 2 DIABETES MELLITUS WITH ESRD (END-STAGE RENAL DISEASE): ICD-10-CM

## 2024-08-21 DIAGNOSIS — E11.9 DIABETES MELLITUS WITHOUT COMPLICATION: ICD-10-CM

## 2024-08-21 DIAGNOSIS — R60.1 GENERALIZED EDEMA: ICD-10-CM

## 2024-08-21 DIAGNOSIS — N18.32 CHRONIC KIDNEY DISEASE (CKD) STAGE G3B/A1, MODERATELY DECREASED GLOMERULAR FILTRATION RATE (GFR) BETWEEN 30-44 ML/MIN/1.73 SQUARE METER AND ALBUMINURIA CREATININE RATIO LESS THAN 30 MG/G (CMS/H*: Primary | ICD-10-CM

## 2024-08-21 DIAGNOSIS — E87.5 HYPERPOTASSEMIA: ICD-10-CM

## 2024-08-21 LAB
BACTERIA UR QL AUTO: ABNORMAL /HPF
BASOPHILS # BLD AUTO: 0.06 10*3/MM3 (ref 0–0.2)
BASOPHILS NFR BLD AUTO: 0.6 % (ref 0–1.5)
BILIRUB UR QL STRIP: NEGATIVE
CLARITY UR: CLEAR
COLOR UR: YELLOW
CREAT UR-MCNC: 62.8 MG/DL
DEPRECATED RDW RBC AUTO: 42.7 FL (ref 37–54)
EOSINOPHIL # BLD AUTO: 0.39 10*3/MM3 (ref 0–0.4)
EOSINOPHIL NFR BLD AUTO: 4.1 % (ref 0.3–6.2)
ERYTHROCYTE [DISTWIDTH] IN BLOOD BY AUTOMATED COUNT: 13.4 % (ref 12.3–15.4)
GLUCOSE UR STRIP-MCNC: NEGATIVE MG/DL
HCT VFR BLD AUTO: 34.3 % (ref 37.5–51)
HGB BLD-MCNC: 11.3 G/DL (ref 13–17.7)
HGB UR QL STRIP.AUTO: NEGATIVE
HYALINE CASTS UR QL AUTO: ABNORMAL /LPF
IMM GRANULOCYTES # BLD AUTO: 0.04 10*3/MM3 (ref 0–0.05)
IMM GRANULOCYTES NFR BLD AUTO: 0.4 % (ref 0–0.5)
KETONES UR QL STRIP: NEGATIVE
LEUKOCYTE ESTERASE UR QL STRIP.AUTO: ABNORMAL
LYMPHOCYTES # BLD AUTO: 2.36 10*3/MM3 (ref 0.7–3.1)
LYMPHOCYTES NFR BLD AUTO: 25 % (ref 19.6–45.3)
MCH RBC QN AUTO: 29.4 PG (ref 26.6–33)
MCHC RBC AUTO-ENTMCNC: 32.9 G/DL (ref 31.5–35.7)
MCV RBC AUTO: 89.1 FL (ref 79–97)
MONOCYTES # BLD AUTO: 0.6 10*3/MM3 (ref 0.1–0.9)
MONOCYTES NFR BLD AUTO: 6.4 % (ref 5–12)
NEUTROPHILS NFR BLD AUTO: 5.99 10*3/MM3 (ref 1.7–7)
NEUTROPHILS NFR BLD AUTO: 63.5 % (ref 42.7–76)
NITRITE UR QL STRIP: NEGATIVE
NRBC BLD AUTO-RTO: 0 /100 WBC (ref 0–0.2)
PH UR STRIP.AUTO: 6 [PH] (ref 5–8)
PLATELET # BLD AUTO: 215 10*3/MM3 (ref 140–450)
PMV BLD AUTO: 10.7 FL (ref 6–12)
PROT ?TM UR-MCNC: 90.9 MG/DL
PROT UR QL STRIP: ABNORMAL
PROT/CREAT UR: 1.45 MG/G{CREAT}
RBC # BLD AUTO: 3.85 10*6/MM3 (ref 4.14–5.8)
RBC # UR STRIP: ABNORMAL /HPF
REF LAB TEST METHOD: ABNORMAL
SP GR UR STRIP: 1.01 (ref 1–1.03)
SQUAMOUS #/AREA URNS HPF: ABNORMAL /HPF
UROBILINOGEN UR QL STRIP: ABNORMAL
WBC # UR STRIP: ABNORMAL /HPF
WBC NRBC COR # BLD AUTO: 9.44 10*3/MM3 (ref 3.4–10.8)

## 2024-08-21 PROCEDURE — 84156 ASSAY OF PROTEIN URINE: CPT

## 2024-08-21 PROCEDURE — 81001 URINALYSIS AUTO W/SCOPE: CPT

## 2024-08-21 PROCEDURE — 36415 COLL VENOUS BLD VENIPUNCTURE: CPT

## 2024-08-21 PROCEDURE — 82570 ASSAY OF URINE CREATININE: CPT

## 2024-08-21 PROCEDURE — 85025 COMPLETE CBC W/AUTO DIFF WBC: CPT

## 2024-08-29 ENCOUNTER — OFFICE VISIT (OUTPATIENT)
Dept: INTERNAL MEDICINE | Age: 79
End: 2024-08-29
Payer: MEDICARE

## 2024-08-29 VITALS
OXYGEN SATURATION: 97 % | WEIGHT: 208 LBS | SYSTOLIC BLOOD PRESSURE: 140 MMHG | BODY MASS INDEX: 29.78 KG/M2 | HEIGHT: 70 IN | DIASTOLIC BLOOD PRESSURE: 80 MMHG | TEMPERATURE: 97.7 F | HEART RATE: 55 BPM

## 2024-08-29 DIAGNOSIS — E87.5 HYPERKALEMIA: ICD-10-CM

## 2024-08-29 DIAGNOSIS — I10 BENIGN ESSENTIAL HTN: ICD-10-CM

## 2024-08-29 DIAGNOSIS — N18.32 STAGE 3B CHRONIC KIDNEY DISEASE: ICD-10-CM

## 2024-08-29 DIAGNOSIS — Z86.79 HISTORY OF HIGH BLOOD PRESSURE: ICD-10-CM

## 2024-08-29 DIAGNOSIS — D50.8 IRON DEFICIENCY ANEMIA SECONDARY TO INADEQUATE DIETARY IRON INTAKE: ICD-10-CM

## 2024-08-29 DIAGNOSIS — E78.2 MIXED HYPERLIPIDEMIA: ICD-10-CM

## 2024-08-29 DIAGNOSIS — E78.00 HIGH CHOLESTEROL: ICD-10-CM

## 2024-08-29 DIAGNOSIS — E11.9 TYPE 2 DIABETES MELLITUS WITHOUT COMPLICATION, WITHOUT LONG-TERM CURRENT USE OF INSULIN: Primary | ICD-10-CM

## 2024-08-29 PROBLEM — K92.1 BLOOD IN STOOL: Status: RESOLVED | Noted: 2023-05-09 | Resolved: 2024-08-29

## 2024-08-29 PROBLEM — R19.7 DIARRHEA: Status: RESOLVED | Noted: 2023-05-09 | Resolved: 2024-08-29

## 2024-08-29 PROBLEM — K21.9 GERD (GASTROESOPHAGEAL REFLUX DISEASE): Status: ACTIVE | Noted: 2024-08-29

## 2024-08-29 PROCEDURE — 3079F DIAST BP 80-89 MM HG: CPT | Performed by: INTERNAL MEDICINE

## 2024-08-29 PROCEDURE — G2211 COMPLEX E/M VISIT ADD ON: HCPCS | Performed by: INTERNAL MEDICINE

## 2024-08-29 PROCEDURE — 1160F RVW MEDS BY RX/DR IN RCRD: CPT | Performed by: INTERNAL MEDICINE

## 2024-08-29 PROCEDURE — 1159F MED LIST DOCD IN RCRD: CPT | Performed by: INTERNAL MEDICINE

## 2024-08-29 PROCEDURE — 3077F SYST BP >= 140 MM HG: CPT | Performed by: INTERNAL MEDICINE

## 2024-08-29 PROCEDURE — 99214 OFFICE O/P EST MOD 30 MIN: CPT | Performed by: INTERNAL MEDICINE

## 2024-08-29 RX ORDER — PRAVASTATIN SODIUM 20 MG
20 TABLET ORAL NIGHTLY
Qty: 90 TABLET | Refills: 3 | Status: SHIPPED | OUTPATIENT
Start: 2024-08-29

## 2024-08-29 RX ORDER — CANDESARTAN 16 MG/1
16 TABLET ORAL NIGHTLY
Qty: 90 TABLET | Refills: 1 | Status: SHIPPED | OUTPATIENT
Start: 2024-08-29

## 2024-08-29 RX ORDER — CARVEDILOL 6.25 MG/1
6.25 TABLET ORAL 2 TIMES DAILY
Qty: 180 TABLET | Refills: 1 | Status: SHIPPED | OUTPATIENT
Start: 2024-08-29

## 2024-08-29 RX ORDER — FUROSEMIDE 20 MG
20 TABLET ORAL DAILY
Qty: 90 TABLET | Refills: 1 | Status: SHIPPED | OUTPATIENT
Start: 2024-08-29

## 2024-08-29 NOTE — PROGRESS NOTES
"Chief Complaint  Diabetes and Follow-up (Pt states that this is routine, he had labs. )    Subjective          Telly Fitch presents to [facility]     Diabetes  Pertinent negatives for hypoglycemia include no confusion. Pertinent negatives for diabetes include no blurred vision, no chest pain and no fatigue.   Follow-upCurrent symptoms include no chest pain, no confusion, no palpitations, no shortness of breath, no fatigue, no blurred vision, no wheezing, no abdominal pain and no cough.     History of present illness:  Patient pleasant 78-year-old male with underlying hypertension, hyperlipidemia, chronic kidney disease stage 3a, as well as diabetes mellitus, who is coming in 8/24 for routine 4-month follow-up.  We will review his labs, address any new concerns, and make new recommendations at that time.    Review of Systems   Constitutional:  Negative for appetite change, fatigue and fever.   HENT:  Negative for congestion and ear pain.    Eyes:  Negative for blurred vision.   Respiratory:  Negative for cough, chest tightness, shortness of breath and wheezing.    Cardiovascular:  Negative for chest pain, palpitations and leg swelling.   Gastrointestinal:  Negative for abdominal pain.   Genitourinary:  Negative for difficulty urinating, dysuria and hematuria.   Musculoskeletal:  Negative for arthralgias and gait problem.   Skin:  Negative for skin lesions.   Neurological:  Negative for syncope, memory problem and confusion.   Psychiatric/Behavioral:  Negative for self-injury and depressed mood.        Objective   Vital Signs:   /80   Pulse 55   Temp 97.7 °F (36.5 °C) (Skin)   Ht 177.8 cm (70\")   Wt 94.3 kg (208 lb)   SpO2 97%   BMI 29.84 kg/m²           Physical Exam  Vitals and nursing note reviewed.   Constitutional:       General: He is not in acute distress.     Appearance: Normal appearance. He is not toxic-appearing.   HENT:      Head: Atraumatic.      " Right Ear: External ear normal.      Left Ear: External ear normal.      Nose: Nose normal.      Mouth/Throat:      Mouth: Mucous membranes are moist.   Eyes:      General:         Right eye: No discharge.         Left eye: No discharge.      Extraocular Movements: Extraocular movements intact.      Pupils: Pupils are equal, round, and reactive to light.   Cardiovascular:      Rate and Rhythm: Normal rate and regular rhythm.      Pulses: Normal pulses.      Heart sounds: Normal heart sounds. No murmur heard.     No gallop.   Pulmonary:      Effort: Pulmonary effort is normal. No respiratory distress.      Breath sounds: No wheezing, rhonchi or rales.   Abdominal:      General: There is no distension.      Palpations: Abdomen is soft. There is no mass.      Tenderness: There is no abdominal tenderness. There is no guarding.   Musculoskeletal:         General: No swelling or tenderness.      Cervical back: No tenderness.      Right lower leg: No edema.      Left lower leg: No edema.   Skin:     General: Skin is warm and dry.      Findings: No rash.   Neurological:      General: No focal deficit present.      Mental Status: He is alert and oriented to person, place, and time. Mental status is at baseline.      Motor: No weakness.      Gait: Gait normal.   Psychiatric:         Mood and Affect: Mood normal.         Thought Content: Thought content normal.          Result Review :   The following data was reviewed by: Anuel Fisher MD on 09/16/2021:                  Assessment and Plan    Diagnoses and all orders for this visit:    1. Type 2 diabetes mellitus without complication, without long-term current use of insulin (Primary)  Overview:  No metformin = CKD3b.    SGLT2 inhibitors are contraindicated,   patient has urinary incontinence, followed by urology.    Assessment & Plan:  A1c is down to 6.7 as of his 8/24 OV.  This was after we started just low-dose glimepiride 1 mg daily.    Orders:  -     Hemoglobin A1c;  Future    2. Stage 3b chronic kidney disease  Assessment & Plan:  GFR stable at 28 as of his 8/24 OV.  We are having some issues with his potassium and making changes as noted above.  There is just a mild acidosis, and nephrology has him on low-dose bicarb already.      Will repeat labs on return to office obviously and appreciate   Dr. Guerra's expertise as well.    Orders:  -     Cancel: Basic Metabolic Panel; Future  -     CBC & Differential; Future  -     Testosterone, Free, Total; Future    3. Hyperkalemia  Assessment & Plan:  Patient's potassium shot up from 4.4 to 5.5.  He does have underlying CKD 3, so we cut his Atacand in half, repeated the labs, and it has come down to 4.9.  Patient has been following a low potassium diet all along and will continue such.  We need to bump his Lasix a bit to help offset this further and to help with blood pressure given changes noted above.    Orders:  -     Comprehensive Metabolic Panel; Future    4. History of high blood pressure  -     carvedilol (COREG) 6.25 MG tablet; Take 1 tablet by mouth 2 (Two) Times a Day.  Dispense: 180 tablet; Refill: 1    5. Benign essential HTN  Assessment & Plan:  Blood pressure is reasonable as of his 8/24 OV, but they do get similar readings if not higher at home.  Also his pulse is in the 40s frequently at home, so we need to back off on his carvedilol.  We just cut his candesartan in half due to persistent hyperkalemia.  We will go up on his Lasix from 10 to 20 mg.  He is also on full dose nifedipine and a good dose of doxazosin.    Asked him to call if blood pressure is not 140 or lower on average in about a month.      6. Iron deficiency anemia secondary to inadequate dietary iron intake  Overview:  Erythropoietin level is normal at 10.5 as of 8/24.    COLON/EGD 4/18...polyps x1...f/u per Dr Duarte...capsule endo neg '19.      Assessment & Plan:  Hemoglobin stable at 11.3 as of 8/24.    Orders:  -     Iron Profile; Future  -      Ferritin; Future    7. Mixed hyperlipidemia  -     Lipid Panel; Future    8. High cholesterol  -     pravastatin (PRAVACHOL) 20 MG tablet; Take 1 tablet by mouth Every Night.  Dispense: 90 tablet; Refill: 3    Other orders  -     candesartan (ATACAND) 16 MG tablet; Take 1 tablet by mouth Every Night.  Dispense: 90 tablet; Refill: 1  -     furosemide (LASIX) 20 MG tablet; Take 1 tablet by mouth Daily.  Dispense: 90 tablet; Refill: 1                 --  --  OLDER NOTES:  VISIT 5/21:  --  HTN typically controlled and please check at home...HCTZ added since last OV; currently high in AM, ? related to CPAP he quit using; will change flomax to cardura and titrate; if no benefit, then will need to get back on CPAP...try 12.5 HCTZ given urinary c/o...seeing Dr Barrera and I rec 24-hr BP monitor and then review results with him on RTO ( ? related to not being on CPAP)...better 8/18, but will try off HCTZ given incont and increase cardura...has not needed PRN clonidine given at 1/19 urgent visit; stable 2/19...up 3/20 and will see if RYR helps=wife thinks it will...145/70 at home=bring wrist cuff on RTO...his cuff reads about 10 pts high top/bottom, but his numbers are up more at home as well; will max out cardura 9/20---> wel controlled 5/21.  CKD3a stable=1.6 (42%) and d/w no nsaids...stable 10/16 = 44%...54% nice...42% ? realted to recent stone tx...46% is holding...53%...48%...60% is nice to see as of 9/20 OV...55% is fine 1/21---> 42% is a bit concerning b/c of proteinuria, but not too far off his norm.  --  LIPIDS...he's maxed out as far as TG's go, but ; rec add fish oil...TG's down with wt loss and/or fish oil... and defer changes...110...99...114 ok for now 2/19...109 in 3/20 and will stop Tricor and try RYR with the statin...TG's 250 off tricor and on RYR; LDL only 70, so no changes needed---> 80 in 1/21.  CP is vague and has appt with cards next week or two...S/P CATH 4/13 with no signif  lesion.  --  DM up some to 6.8, but no change meds needed...ditto for 7.0 after holidays...6.0 with wt loss, so lower amaryl to qd...5.5 so stop it...6.8 so resume 1/2 of 2 mg tab in AM only...he was on 1/2 bid up until just a week ago; A1C 6.0, so ok to stay on 1/2 bid...6.3...6.1...5.9 and rec only take 1/2 in AM...6.4 is fine and will stop it on RTO if same...6.0 and will stop the 2 mg of amaryl now=2/19...6.8...6.3 is better ballpark off it---> 6.3 is same in 5/21.  MICRO-ALB = 140 at 2/18 OV; on max ARB/CCB, so will just monitor for now---> was 500 in '20 and now is 5000 in 5/21 = to RENAL and I will get U/S.  MORBID OBESITY down 25 past 6 mo to 226 at 6/16 OV--->225 holding.  --  PULM NODULES 11/14 (former tobacco screening)...no change 6/15 with f/u needed...LLL 5 mm is stable many years, but RUL 7 mm needs one more...neg and no further rec.  --  UGI BLEED=PUD s/p inject...Dr Duarte 12/12 with neg EGD...Hgb recovered...12.5 so resume iron...13.1 better and likely== CKD...12.7 is fine...13 +  FE DEF ANEMIA and I rec lower to qd for ferritin of 310 at 10/17 OV...12.2 and ferritin better, so stay on qd...had scopes and has f/u with Dr Duarte for path and labs as of 5/18 OV...12.2... 12.7 and ok to stay on for now...12.1 with stable iron is ok...11.4 is wrong direction, so will ask Dr Arguello to eval...she has him on more iron and B12; had capsule endoscopy neg '19 as well=defer to Dr Arguello---> 12.7 in 1/21 with stable iron.  LFT's with mild bump that Dr Duarte noted as well...wnl...still wnl, but agree with checking Hep C... Hep B/C neg.  GERD w/o dysphagia.  --  A.R.....on zyrtec/nasonex/singulair, and sees Dr Charles...had CT per Dr Mosqueda (ENT).  RAD w/o flare.   RUPESH on CPAP @ 15 cmH2O...quit using it past year b/c issues with getting parts?  --  URGENT VISIT 7/19 and 11/20:  R LEG PAIN=twisted knee a few weeks ago, and then fell in hole yesterday; throbs at rest, and painful to walk on; no swelling in knee, and  actually says pain is moreso in the hip; decent ROM, but has point tenderness in the greater trochanter; per orders and call Monday.  LBP into L LEG=I d/w him it's not the hip=saw Dr Meredith last month; no change bowel/bladder; is tender L greater trochanter, neg SLR; will get into PTA for L hip and LBP and add gabapentin/medrol...sxs resolved with just gabapentin and ? with passing kidney stone=d/w try weaning off it now...sxs in L hand and in L leg as of 3/20 OV that may be overuse syndrome=laying tile/etc for past 3 months; will get NCS given weak  and refer to Dr Randhawa again if worse.  --  KIDNEY STONES per a Dr Benavidez.  BPH/OAB per Urology in [place].  --  PSA 0.3 (7/11/12)...defer  COLON/EGD 6/23 = 5 polyps = 3 years per Dr. Campos.  (, my pt, 2 children)    Follow Up   Return in about 4 months (around 12/29/2024).  Patient was given instructions and counseling regarding his condition or for health maintenance advice. Please see specific information pulled into the AVS if appropriate.

## 2024-08-29 NOTE — ASSESSMENT & PLAN NOTE
Patient's potassium shot up from 4.4 to 5.5.  He does have underlying CKD 3, so we cut his Atacand in half, repeated the labs, and it has come down to 4.9.  Patient has been following a low potassium diet all along and will continue such.  We need to bump his Lasix a bit to help offset this further and to help with blood pressure given changes noted above.

## 2024-08-29 NOTE — ASSESSMENT & PLAN NOTE
Blood pressure is reasonable as of his 8/24 OV, but they do get similar readings if not higher at home.  Also his pulse is in the 40s frequently at home, so we need to back off on his carvedilol.  We just cut his candesartan in half due to persistent hyperkalemia.  We will go up on his Lasix from 10 to 20 mg.  He is also on full dose nifedipine and a good dose of doxazosin.    Asked him to call if blood pressure is not 140 or lower on average in about a month.

## 2024-08-29 NOTE — ASSESSMENT & PLAN NOTE
A1c is down to 6.7 as of his 8/24 OV.  This was after we started just low-dose glimepiride 1 mg daily.

## 2024-08-29 NOTE — PATIENT INSTRUCTIONS
1.  Continue cutting the 32 mg candesartan in half, when you run out of this, he has a 16 mg tablet that you will take the whole one of available at the pharmacy.    2.  Start cutting the 12.5 mg carvedilol in half, give this twice a day still, when you run out of this, you have a 6.25 mg prescription available at the pharmacy, you will give the whole tablet twice a day.    3.  Increase the furosemide from half of a 20 mg tablet daily to the whole 20 mg tablet daily.  You will get 90 of these with the next refill.    4.  Call with the average reading on the top number of his blood pressure in about 4 to 6 weeks.

## 2024-08-29 NOTE — ASSESSMENT & PLAN NOTE
GFR stable at 28 as of his 8/24 OV.  We are having some issues with his potassium and making changes as noted above.  There is just a mild acidosis, and nephrology has him on low-dose bicarb already.      Will repeat labs on return to office obviously and appreciate   Dr. Guerra's expertise as well.

## 2024-10-01 ENCOUNTER — CLINICAL SUPPORT (OUTPATIENT)
Dept: INTERNAL MEDICINE | Age: 79
End: 2024-10-01
Payer: MEDICARE

## 2024-10-01 DIAGNOSIS — Z23 NEED FOR VACCINATION: Primary | ICD-10-CM

## 2024-10-01 PROCEDURE — G0008 ADMIN INFLUENZA VIRUS VAC: HCPCS | Performed by: INTERNAL MEDICINE

## 2024-10-01 PROCEDURE — 90662 IIV NO PRSV INCREASED AG IM: CPT | Performed by: INTERNAL MEDICINE

## 2024-10-10 ENCOUNTER — TELEPHONE (OUTPATIENT)
Dept: INTERNAL MEDICINE | Age: 79
End: 2024-10-10
Payer: MEDICARE

## 2024-10-10 RX ORDER — NEBIVOLOL 5 MG/1
5 TABLET ORAL DAILY
Qty: 90 TABLET | Refills: 1 | Status: SHIPPED | OUTPATIENT
Start: 2024-10-10

## 2024-10-10 NOTE — TELEPHONE ENCOUNTER
Pt has several days of blood pressure readings scanned in under media.   He would like you to look at them.     He is also asking about the growth in his groin. He had an ultrasound in June

## 2024-10-10 NOTE — TELEPHONE ENCOUNTER
1.  I sent a medication called Bystolic/nebivolol over to [facility].  He is to take this once a day instead of carvedilol/Coreg which she was taking twice a day.    2.  I am hoping that it will do a better job at lowering his blood pressure without lowering his heart rate.    3.  Tell him to send us the readings in about 3 weeks.    4.  In regards to the testicular ultrasound, I never saw this, we did not order it, it was ordered through Dr. Guerra's office.  The testicles themselves looked normal, but he did have 1 cyst on each side.  I would assume they did not feel the cysts required further evaluation or they would have reached out to him.  However, I recommend he reach out to their office to confirm nothing more needs to be done, thanks.

## 2024-10-15 ENCOUNTER — TELEPHONE (OUTPATIENT)
Dept: INTERNAL MEDICINE | Age: 79
End: 2024-10-15
Payer: MEDICARE

## 2024-10-15 DIAGNOSIS — N44.2 TESTICULAR CYST: Primary | ICD-10-CM

## 2024-10-15 RX ORDER — LOSARTAN POTASSIUM 50 MG/1
50 TABLET ORAL DAILY
Qty: 90 TABLET | Refills: 1 | Status: SHIPPED | OUTPATIENT
Start: 2024-10-15

## 2024-10-15 NOTE — TELEPHONE ENCOUNTER
I spoke with pt's wife and she states that they are not happy with the service from Dr. Guerra's office. They are wanting to know who they can see for the cysts. Please advise.   I have let them know of everything else.

## 2024-10-15 NOTE — TELEPHONE ENCOUNTER
Pt can not get candesartan at [facility] , it must be on back order. Do you want to change him to something else?

## 2024-10-30 ENCOUNTER — TELEPHONE (OUTPATIENT)
Dept: UROLOGY | Age: 79
End: 2024-10-30
Payer: MEDICARE

## 2024-10-30 DIAGNOSIS — N39.41 URGE INCONTINENCE: ICD-10-CM

## 2024-10-30 RX ORDER — TOLTERODINE 2 MG/1
4 CAPSULE, EXTENDED RELEASE ORAL DAILY
Qty: 180 CAPSULE | Refills: 3 | Status: SHIPPED | OUTPATIENT
Start: 2024-10-30

## 2024-10-30 NOTE — TELEPHONE ENCOUNTER
RICARDO CALLED FROM PHARMACY AT [facility].  PATIENT REQUESTED A REFILL ON TOLTERODINE 2 MG BID.  HIS PRESCRIPTION IS EXPIRING, AND SHE ASKED FOR REFILLS.

## 2024-11-07 ENCOUNTER — TELEPHONE (OUTPATIENT)
Dept: INTERNAL MEDICINE | Age: 79
End: 2024-11-07
Payer: MEDICARE

## 2024-11-08 RX ORDER — GLIMEPIRIDE 2 MG/1
1 TABLET ORAL
Qty: 45 TABLET | Refills: 1 | Status: SHIPPED | OUTPATIENT
Start: 2024-11-08

## 2024-11-25 ENCOUNTER — LAB (OUTPATIENT)
Facility: HOSPITAL | Age: 79
End: 2024-11-25
Payer: MEDICARE

## 2024-11-25 ENCOUNTER — TRANSCRIBE ORDERS (OUTPATIENT)
Dept: ADMINISTRATIVE | Facility: HOSPITAL | Age: 79
End: 2024-11-25
Payer: MEDICARE

## 2024-11-25 DIAGNOSIS — E87.5 HYPERKALEMIA: ICD-10-CM

## 2024-11-25 DIAGNOSIS — E78.2 MIXED HYPERLIPIDEMIA: ICD-10-CM

## 2024-11-25 DIAGNOSIS — N18.32 CHRONIC KIDNEY DISEASE (CKD) STAGE G3B/A1, MODERATELY DECREASED GLOMERULAR FILTRATION RATE (GFR) BETWEEN 30-44 ML/MIN/1.73 SQUARE METER AND ALBUMINURIA CREATININE RATIO LESS THAN 30 MG/G (CMS/H*: Primary | ICD-10-CM

## 2024-11-25 DIAGNOSIS — E55.9 VITAMIN D DEFICIENCY: ICD-10-CM

## 2024-11-25 DIAGNOSIS — N18.32 CHRONIC KIDNEY DISEASE (CKD) STAGE G3B/A1, MODERATELY DECREASED GLOMERULAR FILTRATION RATE (GFR) BETWEEN 30-44 ML/MIN/1.73 SQUARE METER AND ALBUMINURIA CREATININE RATIO LESS THAN 30 MG/G (CMS/H*: ICD-10-CM

## 2024-11-25 LAB
25(OH)D3 SERPL-MCNC: 44.2 NG/ML (ref 30–100)
ALBUMIN SERPL-MCNC: 4.5 G/DL (ref 3.5–5.2)
ALBUMIN/GLOB SERPL: 1.8 G/DL
ALP SERPL-CCNC: 45 U/L (ref 39–117)
ALT SERPL W P-5'-P-CCNC: 21 U/L (ref 1–41)
ANION GAP SERPL CALCULATED.3IONS-SCNC: 13.3 MMOL/L (ref 5–15)
AST SERPL-CCNC: 19 U/L (ref 1–40)
BACTERIA UR QL AUTO: ABNORMAL /HPF
BASOPHILS # BLD AUTO: 0.06 10*3/MM3 (ref 0–0.2)
BASOPHILS NFR BLD AUTO: 0.7 % (ref 0–1.5)
BILIRUB SERPL-MCNC: 0.3 MG/DL (ref 0–1.2)
BILIRUB UR QL STRIP: NEGATIVE
BUN SERPL-MCNC: 57 MG/DL (ref 8–23)
BUN/CREAT SERPL: 20.3 (ref 7–25)
CALCIUM SPEC-SCNC: 9.2 MG/DL (ref 8.6–10.5)
CHLORIDE SERPL-SCNC: 105 MMOL/L (ref 98–107)
CHOLEST SERPL-MCNC: 141 MG/DL (ref 0–200)
CLARITY UR: CLEAR
CO2 SERPL-SCNC: 22.7 MMOL/L (ref 22–29)
COLOR UR: YELLOW
CREAT SERPL-MCNC: 2.81 MG/DL (ref 0.76–1.27)
CREAT UR-MCNC: 48.8 MG/DL
DEPRECATED RDW RBC AUTO: 43.5 FL (ref 37–54)
EGFRCR SERPLBLD CKD-EPI 2021: 22.4 ML/MIN/1.73
EOSINOPHIL # BLD AUTO: 0.33 10*3/MM3 (ref 0–0.4)
EOSINOPHIL NFR BLD AUTO: 3.9 % (ref 0.3–6.2)
ERYTHROCYTE [DISTWIDTH] IN BLOOD BY AUTOMATED COUNT: 12.9 % (ref 12.3–15.4)
GLOBULIN UR ELPH-MCNC: 2.5 GM/DL
GLUCOSE SERPL-MCNC: 87 MG/DL (ref 65–99)
GLUCOSE UR STRIP-MCNC: NEGATIVE MG/DL
HCT VFR BLD AUTO: 36.4 % (ref 37.5–51)
HDLC SERPL-MCNC: 28 MG/DL (ref 40–60)
HGB BLD-MCNC: 11.7 G/DL (ref 13–17.7)
HGB UR QL STRIP.AUTO: NEGATIVE
HYALINE CASTS UR QL AUTO: ABNORMAL /LPF
IMM GRANULOCYTES # BLD AUTO: 0.03 10*3/MM3 (ref 0–0.05)
IMM GRANULOCYTES NFR BLD AUTO: 0.4 % (ref 0–0.5)
KETONES UR QL STRIP: NEGATIVE
LDLC SERPL CALC-MCNC: 68 MG/DL (ref 0–100)
LDLC/HDLC SERPL: 2.05 {RATIO}
LEUKOCYTE ESTERASE UR QL STRIP.AUTO: ABNORMAL
LYMPHOCYTES # BLD AUTO: 2.36 10*3/MM3 (ref 0.7–3.1)
LYMPHOCYTES NFR BLD AUTO: 27.8 % (ref 19.6–45.3)
MCH RBC QN AUTO: 29.5 PG (ref 26.6–33)
MCHC RBC AUTO-ENTMCNC: 32.1 G/DL (ref 31.5–35.7)
MCV RBC AUTO: 91.7 FL (ref 79–97)
MONOCYTES # BLD AUTO: 0.61 10*3/MM3 (ref 0.1–0.9)
MONOCYTES NFR BLD AUTO: 7.2 % (ref 5–12)
NEUTROPHILS NFR BLD AUTO: 5.09 10*3/MM3 (ref 1.7–7)
NEUTROPHILS NFR BLD AUTO: 60 % (ref 42.7–76)
NITRITE UR QL STRIP: NEGATIVE
NRBC BLD AUTO-RTO: 0 /100 WBC (ref 0–0.2)
PH UR STRIP.AUTO: 6 [PH] (ref 5–8)
PHOSPHATE SERPL-MCNC: 3.9 MG/DL (ref 2.5–4.5)
PLATELET # BLD AUTO: 199 10*3/MM3 (ref 140–450)
PMV BLD AUTO: 11.5 FL (ref 6–12)
POTASSIUM SERPL-SCNC: 4.8 MMOL/L (ref 3.5–5.2)
PROT ?TM UR-MCNC: 67.2 MG/DL
PROT SERPL-MCNC: 7 G/DL (ref 6–8.5)
PROT UR QL STRIP: ABNORMAL
PROT/CREAT UR: 1.38 MG/G{CREAT}
PTH-INTACT SERPL-MCNC: 44.8 PG/ML (ref 15–65)
RBC # BLD AUTO: 3.97 10*6/MM3 (ref 4.14–5.8)
RBC # UR STRIP: ABNORMAL /HPF
REF LAB TEST METHOD: ABNORMAL
SODIUM SERPL-SCNC: 141 MMOL/L (ref 136–145)
SP GR UR STRIP: 1.01 (ref 1–1.03)
SQUAMOUS #/AREA URNS HPF: ABNORMAL /HPF
TRIGL SERPL-MCNC: 278 MG/DL (ref 0–150)
UROBILINOGEN UR QL STRIP: ABNORMAL
VLDLC SERPL-MCNC: 45 MG/DL (ref 5–40)
WBC # UR STRIP: ABNORMAL /HPF
WBC NRBC COR # BLD AUTO: 8.48 10*3/MM3 (ref 3.4–10.8)

## 2024-11-25 PROCEDURE — 84100 ASSAY OF PHOSPHORUS: CPT

## 2024-11-25 PROCEDURE — 36415 COLL VENOUS BLD VENIPUNCTURE: CPT

## 2024-11-25 PROCEDURE — 83970 ASSAY OF PARATHORMONE: CPT

## 2024-11-25 PROCEDURE — 80061 LIPID PANEL: CPT

## 2024-11-25 PROCEDURE — 80053 COMPREHEN METABOLIC PANEL: CPT

## 2024-11-25 PROCEDURE — 82570 ASSAY OF URINE CREATININE: CPT

## 2024-11-25 PROCEDURE — 84156 ASSAY OF PROTEIN URINE: CPT

## 2024-11-25 PROCEDURE — 85025 COMPLETE CBC W/AUTO DIFF WBC: CPT

## 2024-11-25 PROCEDURE — 81001 URINALYSIS AUTO W/SCOPE: CPT

## 2024-11-25 PROCEDURE — 82306 VITAMIN D 25 HYDROXY: CPT

## 2024-12-05 DIAGNOSIS — N50.819 PAIN IN TESTICLE, UNSPECIFIED LATERALITY: Primary | ICD-10-CM

## 2024-12-23 ENCOUNTER — LAB (OUTPATIENT)
Dept: LAB | Facility: HOSPITAL | Age: 79
End: 2024-12-23
Payer: MEDICARE

## 2024-12-23 DIAGNOSIS — E11.9 TYPE 2 DIABETES MELLITUS WITHOUT COMPLICATION, WITHOUT LONG-TERM CURRENT USE OF INSULIN: ICD-10-CM

## 2024-12-23 DIAGNOSIS — N18.32 STAGE 3B CHRONIC KIDNEY DISEASE: ICD-10-CM

## 2024-12-23 DIAGNOSIS — D50.8 IRON DEFICIENCY ANEMIA SECONDARY TO INADEQUATE DIETARY IRON INTAKE: ICD-10-CM

## 2024-12-23 LAB
BASOPHILS # BLD AUTO: 0.05 10*3/MM3 (ref 0–0.2)
BASOPHILS NFR BLD AUTO: 0.6 % (ref 0–1.5)
DEPRECATED RDW RBC AUTO: 43.6 FL (ref 37–54)
EOSINOPHIL # BLD AUTO: 0.32 10*3/MM3 (ref 0–0.4)
EOSINOPHIL NFR BLD AUTO: 3.5 % (ref 0.3–6.2)
ERYTHROCYTE [DISTWIDTH] IN BLOOD BY AUTOMATED COUNT: 13.1 % (ref 12.3–15.4)
FERRITIN SERPL-MCNC: 367 NG/ML (ref 30–400)
HBA1C MFR BLD: 6.6 % (ref 4.8–5.6)
HCT VFR BLD AUTO: 35.5 % (ref 37.5–51)
HGB BLD-MCNC: 11.5 G/DL (ref 13–17.7)
IMM GRANULOCYTES # BLD AUTO: 0.04 10*3/MM3 (ref 0–0.05)
IMM GRANULOCYTES NFR BLD AUTO: 0.4 % (ref 0–0.5)
IRON 24H UR-MRATE: 101 MCG/DL (ref 59–158)
IRON SATN MFR SERPL: 30 % (ref 20–50)
LYMPHOCYTES # BLD AUTO: 2.11 10*3/MM3 (ref 0.7–3.1)
LYMPHOCYTES NFR BLD AUTO: 23.3 % (ref 19.6–45.3)
MCH RBC QN AUTO: 29.8 PG (ref 26.6–33)
MCHC RBC AUTO-ENTMCNC: 32.4 G/DL (ref 31.5–35.7)
MCV RBC AUTO: 92 FL (ref 79–97)
MONOCYTES # BLD AUTO: 0.54 10*3/MM3 (ref 0.1–0.9)
MONOCYTES NFR BLD AUTO: 6 % (ref 5–12)
NEUTROPHILS NFR BLD AUTO: 6.01 10*3/MM3 (ref 1.7–7)
NEUTROPHILS NFR BLD AUTO: 66.2 % (ref 42.7–76)
NRBC BLD AUTO-RTO: 0 /100 WBC (ref 0–0.2)
PLATELET # BLD AUTO: 169 10*3/MM3 (ref 140–450)
PMV BLD AUTO: 11.7 FL (ref 6–12)
RBC # BLD AUTO: 3.86 10*6/MM3 (ref 4.14–5.8)
TIBC SERPL-MCNC: 340 MCG/DL (ref 298–536)
TRANSFERRIN SERPL-MCNC: 228 MG/DL (ref 200–360)
WBC NRBC COR # BLD AUTO: 9.07 10*3/MM3 (ref 3.4–10.8)

## 2024-12-23 PROCEDURE — 84466 ASSAY OF TRANSFERRIN: CPT

## 2024-12-23 PROCEDURE — 82728 ASSAY OF FERRITIN: CPT

## 2024-12-23 PROCEDURE — 85025 COMPLETE CBC W/AUTO DIFF WBC: CPT

## 2024-12-23 PROCEDURE — 84403 ASSAY OF TOTAL TESTOSTERONE: CPT

## 2024-12-23 PROCEDURE — 83036 HEMOGLOBIN GLYCOSYLATED A1C: CPT

## 2024-12-23 PROCEDURE — 83540 ASSAY OF IRON: CPT

## 2024-12-23 PROCEDURE — 36415 COLL VENOUS BLD VENIPUNCTURE: CPT

## 2024-12-23 PROCEDURE — 84402 ASSAY OF FREE TESTOSTERONE: CPT

## 2024-12-24 ENCOUNTER — HOSPITAL ENCOUNTER (OUTPATIENT)
Dept: ULTRASOUND IMAGING | Facility: HOSPITAL | Age: 79
Discharge: HOME | End: 2024-12-24
Admitting: NURSE PRACTITIONER
Payer: MEDICARE

## 2024-12-24 DIAGNOSIS — N50.819 PAIN IN TESTICLE, UNSPECIFIED LATERALITY: ICD-10-CM

## 2024-12-24 LAB
TESTOST FREE SERPL-MCNC: 4.9 PG/ML (ref 6.6–18.1)
TESTOST SERPL-MCNC: 185 NG/DL (ref 264–916)

## 2024-12-24 PROCEDURE — 76870 US EXAM SCROTUM: CPT

## 2024-12-30 ENCOUNTER — OFFICE VISIT (OUTPATIENT)
Dept: INTERNAL MEDICINE | Age: 79
End: 2024-12-30
Payer: MEDICARE

## 2024-12-30 VITALS
DIASTOLIC BLOOD PRESSURE: 66 MMHG | BODY MASS INDEX: 32.16 KG/M2 | OXYGEN SATURATION: 92 % | SYSTOLIC BLOOD PRESSURE: 126 MMHG | WEIGHT: 224.6 LBS | HEART RATE: 58 BPM | TEMPERATURE: 98 F | HEIGHT: 70 IN

## 2024-12-30 DIAGNOSIS — E78.2 MIXED HYPERLIPIDEMIA: ICD-10-CM

## 2024-12-30 DIAGNOSIS — N18.32 STAGE 3B CHRONIC KIDNEY DISEASE: ICD-10-CM

## 2024-12-30 DIAGNOSIS — D50.8 IRON DEFICIENCY ANEMIA SECONDARY TO INADEQUATE DIETARY IRON INTAKE: ICD-10-CM

## 2024-12-30 DIAGNOSIS — R01.1 NEWLY RECOGNIZED HEART MURMUR: ICD-10-CM

## 2024-12-30 DIAGNOSIS — I10 BENIGN ESSENTIAL HTN: ICD-10-CM

## 2024-12-30 DIAGNOSIS — J45.20 MILD INTERMITTENT ASTHMA WITHOUT COMPLICATION: ICD-10-CM

## 2024-12-30 DIAGNOSIS — E11.9 TYPE 2 DIABETES MELLITUS WITHOUT COMPLICATION, WITHOUT LONG-TERM CURRENT USE OF INSULIN: Primary | ICD-10-CM

## 2024-12-30 PROBLEM — E87.5 HYPERKALEMIA: Status: RESOLVED | Noted: 2022-01-11 | Resolved: 2024-12-30

## 2024-12-30 PROBLEM — R53.1 GENERALIZED WEAKNESS: Status: RESOLVED | Noted: 2023-04-30 | Resolved: 2024-12-30

## 2024-12-30 PROCEDURE — 3074F SYST BP LT 130 MM HG: CPT | Performed by: INTERNAL MEDICINE

## 2024-12-30 PROCEDURE — 99214 OFFICE O/P EST MOD 30 MIN: CPT | Performed by: INTERNAL MEDICINE

## 2024-12-30 PROCEDURE — 1159F MED LIST DOCD IN RCRD: CPT | Performed by: INTERNAL MEDICINE

## 2024-12-30 PROCEDURE — 3078F DIAST BP <80 MM HG: CPT | Performed by: INTERNAL MEDICINE

## 2024-12-30 PROCEDURE — 1160F RVW MEDS BY RX/DR IN RCRD: CPT | Performed by: INTERNAL MEDICINE

## 2024-12-30 PROCEDURE — G2211 COMPLEX E/M VISIT ADD ON: HCPCS | Performed by: INTERNAL MEDICINE

## 2024-12-30 RX ORDER — GLIMEPIRIDE 1 MG/1
1 TABLET ORAL
Qty: 90 TABLET | Refills: 1 | Status: SHIPPED | OUTPATIENT
Start: 2024-12-30

## 2024-12-30 NOTE — ASSESSMENT & PLAN NOTE
A1c is staying down at 6.6 as of his 12/24 OV, he is just on 1 mg of glimepiride and stable to continue same.

## 2024-12-30 NOTE — ASSESSMENT & PLAN NOTE
Patient's blood pressure is well-controlled as of his 12/24 OV, his pulse is right around 60 as well.  We switched from carvedilol over to Bystolic, and he stable to continue just low-dose Bystolic.  He also has full dose nifedipine and moderate dose losartan on board, we had to lower this due to hyperkalemia.  Also on low-dose doxazosin and Lasix.  Continue same treatment.

## 2024-12-30 NOTE — ASSESSMENT & PLAN NOTE
Hemoglobin is stable at 11.5 as of his 12/24 OV.  His iron levels are fine, this is related to his underlying CKD.

## 2024-12-30 NOTE — ASSESSMENT & PLAN NOTE
GFR is down to 22 as of 12/24.  His BUN is up to 57, so he may be a little on the dry side.  We had increased from 10 to 20 mg of furosemide due to the hyperkalemia, so we may need to back off on this some, but will defer to nephrology, they have already seen these labs.

## 2024-12-30 NOTE — PROGRESS NOTES
"Chief Complaint  Follow-up (4 month f/u. Pt has had labs./No new issues.) and Diabetes    Subjective          Telly Fitch presents to [facility]     Diabetes  Pertinent negatives for hypoglycemia include no confusion. Pertinent negatives for diabetes include no blurred vision, no chest pain and no fatigue.     History of present illness:  Patient pleasant 79-year-old male with underlying hypertension, hyperlipidemia, chronic kidney disease stage 3a, as well as diabetes mellitus, who is coming in 12/24 for routine 4-6-month follow-up.  We will review his labs, address any new concerns, and make new recommendations at that time.    Review of Systems   Constitutional:  Negative for appetite change, fatigue and fever.   HENT:  Negative for congestion and ear pain.    Eyes:  Negative for blurred vision.   Respiratory:  Negative for cough, chest tightness, shortness of breath and wheezing.    Cardiovascular:  Negative for chest pain, palpitations and leg swelling.   Gastrointestinal:  Negative for abdominal pain.   Genitourinary:  Negative for difficulty urinating, dysuria and hematuria.   Musculoskeletal:  Negative for arthralgias and gait problem.   Skin:  Negative for skin lesions.   Neurological:  Negative for syncope, memory problem and confusion.   Psychiatric/Behavioral:  Negative for self-injury and depressed mood.        Objective   Vital Signs:   /66 (BP Location: Left arm, Patient Position: Sitting, Cuff Size: Adult)   Pulse 58   Temp 98 °F (36.7 °C) (Skin)   Ht 177.8 cm (70\")   Wt 102 kg (224 lb 9.6 oz)   SpO2 92%   BMI 32.23 kg/m²           Physical Exam  Vitals and nursing note reviewed.   Constitutional:       General: He is not in acute distress.     Appearance: Normal appearance. He is not toxic-appearing.   HENT:      Head: Atraumatic.      Right Ear: External ear normal.      Left Ear: External ear normal.      Nose: Nose normal.      Mouth/Throat: "      Mouth: Mucous membranes are moist.   Eyes:      General:         Right eye: No discharge.         Left eye: No discharge.      Extraocular Movements: Extraocular movements intact.      Pupils: Pupils are equal, round, and reactive to light.   Cardiovascular:      Rate and Rhythm: Normal rate and regular rhythm.      Pulses: Normal pulses.      Heart sounds: Normal heart sounds. No murmur heard.     No gallop.   Pulmonary:      Effort: Pulmonary effort is normal. No respiratory distress.      Breath sounds: No wheezing, rhonchi or rales.   Abdominal:      General: There is no distension.      Palpations: Abdomen is soft. There is no mass.      Tenderness: There is no abdominal tenderness. There is no guarding.   Musculoskeletal:         General: No swelling or tenderness.      Cervical back: No tenderness.      Right lower leg: No edema.      Left lower leg: No edema.   Skin:     General: Skin is warm and dry.      Findings: No rash.   Neurological:      General: No focal deficit present.      Mental Status: He is alert and oriented to person, place, and time. Mental status is at baseline.      Motor: No weakness.      Gait: Gait normal.   Psychiatric:         Mood and Affect: Mood normal.         Thought Content: Thought content normal.          Result Review :   The following data was reviewed by: Anuel Fisher MD on 09/16/2021:                  Assessment and Plan    Diagnoses and all orders for this visit:    1. Type 2 diabetes mellitus without complication, without long-term current use of insulin (Primary)  Overview:  No metformin = CKD3b.    SGLT2 inhibitors are contraindicated,   patient has urinary incontinence, followed by urology.    Assessment & Plan:  A1c is staying down at 6.6 as of his 12/24 OV, he is just on 1 mg of glimepiride and stable to continue same.    Orders:  -     Hemoglobin A1c; Future  -     TSH; Future    2. Benign essential HTN  Overview:  Phone message 10/24:  1.  I sent a  medication called Bystolic/nebivolol over to [facility].  He is to take this once a day instead of carvedilol/Coreg which she was taking twice a day.    2.  I am hoping that it will do a better job at lowering his blood pressure without lowering his heart rate.    3.  Tell him to send us the readings in about 3 weeks.    Assessment & Plan:  Patient's blood pressure is well-controlled as of his 12/24 OV, his pulse is right around 60 as well.  We switched from carvedilol over to Bystolic, and he stable to continue just low-dose Bystolic.  He also has full dose nifedipine and moderate dose losartan on board, we had to lower this due to hyperkalemia.  Also on low-dose doxazosin and Lasix.  Continue same treatment.    Orders:  -     Comprehensive Metabolic Panel; Future    3. Mixed hyperlipidemia  Assessment & Plan:  LDL is down from 84 to 68 as of his 12/24 OV.  He is just on low to moderate dose pravastatin, but I think were fine continuing same for now.    Orders:  -     Lipid Panel; Future    4. Stage 3b chronic kidney disease  Assessment & Plan:  GFR is down to 22 as of 12/24.  His BUN is up to 57, so he may be a little on the dry side.  We had increased from 10 to 20 mg of furosemide due to the hyperkalemia, so we may need to back off on this some, but will defer to nephrology, they have already seen these labs.    Orders:  -     CBC & Differential; Future    5. Mild intermittent asthma without complication  Assessment & Plan:  This is very stable as of his 12/24 OV, he is not requiring Advair/albuterol/etc.        6. Iron deficiency anemia secondary to inadequate dietary iron intake  Overview:  Erythropoietin level is normal at 10.5 as of 8/24.    COLON/EGD 4/18...polyps x1...f/u per Dr Duarte...capsule endo neg '19.      Assessment & Plan:  Hemoglobin is stable at 11.5 as of his 12/24 OV.  His iron levels are fine, this is related to his underlying CKD.    Orders:  -     Ferritin; Future  -     Iron Profile;  Future    7. Newly recognized heart murmur  -     Adult Transthoracic Echo Complete W/ Cont if Necessary Per Protocol; Future    Other orders  -     glimepiride (Amaryl) 1 MG tablet; Take 1 tablet by mouth Every Morning Before Breakfast.  Dispense: 90 tablet; Refill: 1                 --  --  OLDER NOTES:  VISIT 5/21:  --  HTN typically controlled and please check at home...HCTZ added since last OV; currently high in AM, ? related to CPAP he quit using; will change flomax to cardura and titrate; if no benefit, then will need to get back on CPAP...try 12.5 HCTZ given urinary c/o...seeing Dr Barrera and I rec 24-hr BP monitor and then review results with him on RTO ( ? related to not being on CPAP)...better 8/18, but will try off HCTZ given incont and increase cardura...has not needed PRN clonidine given at 1/19 urgent visit; stable 2/19...up 3/20 and will see if RYR helps=wife thinks it will...145/70 at home=bring wrist cuff on RTO...his cuff reads about 10 pts high top/bottom, but his numbers are up more at home as well; will max out cardura 9/20---> wel controlled 5/21.  CKD3a stable=1.6 (42%) and d/w no nsaids...stable 10/16 = 44%...54% nice...42% ? realted to recent stone tx...46% is holding...53%...48%...60% is nice to see as of 9/20 OV...55% is fine 1/21---> 42% is a bit concerning b/c of proteinuria, but not too far off his norm.  --  LIPIDS...he's maxed out as far as TG's go, but ; rec add fish oil...TG's down with wt loss and/or fish oil... and defer changes...110...99...114 ok for now 2/19...109 in 3/20 and will stop Tricor and try RYR with the statin...TG's 250 off tricor and on RYR; LDL only 70, so no changes needed---> 80 in 1/21.  CP is vague and has appt with cards next week or two...S/P CATH 4/13 with no signif lesion.  --  DM up some to 6.8, but no change meds needed...ditto for 7.0 after holidays...6.0 with wt loss, so lower amaryl to qd...5.5 so stop it...6.8 so resume 1/2 of 2 mg tab  in AM only...he was on 1/2 bid up until just a week ago; A1C 6.0, so ok to stay on 1/2 bid...6.3...6.1...5.9 and rec only take 1/2 in AM...6.4 is fine and will stop it on RTO if same...6.0 and will stop the 2 mg of amaryl now=2/19...6.8...6.3 is better ballpark off it---> 6.3 is same in 5/21.  MICRO-ALB = 140 at 2/18 OV; on max ARB/CCB, so will just monitor for now---> was 500 in '20 and now is 5000 in 5/21 = to RENAL and I will get U/S.  MORBID OBESITY down 25 past 6 mo to 226 at 6/16 OV--->225 holding.  --  PULM NODULES 11/14 (former tobacco screening)...no change 6/15 with f/u needed...LLL 5 mm is stable many years, but RUL 7 mm needs one more...neg and no further rec.  --  UGI BLEED=PUD s/p inject...Dr Duarte 12/12 with neg EGD...Hgb recovered...12.5 so resume iron...13.1 better and likely== CKD...12.7 is fine...13 +  FE DEF ANEMIA and I rec lower to qd for ferritin of 310 at 10/17 OV...12.2 and ferritin better, so stay on qd...had scopes and has f/u with Dr Duarte for path and labs as of 5/18 OV...12.2... 12.7 and ok to stay on for now...12.1 with stable iron is ok...11.4 is wrong direction, so will ask Dr Arguello to eval...she has him on more iron and B12; had capsule endoscopy neg '19 as well=defer to Dr Arguello---> 12.7 in 1/21 with stable iron.  LFT's with mild bump that Dr Duarte noted as well...wnl...still wnl, but agree with checking Hep C... Hep B/C neg.  GERD w/o dysphagia.  --  A.R.....on zyrtec/nasonex/singulair, and sees Dr Charles...had CT per Dr Mosqueda (ENT).  RAD w/o flare.   RUPESH on CPAP @ 15 cmH2O...quit using it past year b/c issues with getting parts?  --  URGENT VISIT 7/19 and 11/20:  R LEG PAIN=twisted knee a few weeks ago, and then fell in hole yesterday; throbs at rest, and painful to walk on; no swelling in knee, and actually says pain is moreso in the hip; decent ROM, but has point tenderness in the greater trochanter; per orders and call Monday.  LBP into L LEG=I d/w him it's not the hip=saw   Been last month; no change bowel/bladder; is tender L greater trochanter, neg SLR; will get into PTA for L hip and LBP and add gabapentin/medrol...sxs resolved with just gabapentin and ? with passing kidney stone=d/w try weaning off it now...sxs in L hand and in L leg as of 3/20 OV that may be overuse syndrome=laying tile/etc for past 3 months; will get NCS given weak  and refer to Dr Randhawa again if worse.  --  KIDNEY STONES per a Dr Benavidez.  BPH/OAB per Urology in [place].  --  PSA 0.3 (7/11/12)...defer  COLON/EGD 6/23 = 5 polyps = 3 years per Dr. Campos.  (, my pt, 2 children)    Follow Up   Return in about 6 months (around 6/30/2025).  Patient was given instructions and counseling regarding his condition or for health maintenance advice. Please see specific information pulled into the AVS if appropriate.

## 2024-12-30 NOTE — ASSESSMENT & PLAN NOTE
LDL is down from 84 to 68 as of his 12/24 OV.  He is just on low to moderate dose pravastatin, but I think were fine continuing same for now.

## 2024-12-31 ENCOUNTER — LAB (OUTPATIENT)
Dept: LAB | Facility: HOSPITAL | Age: 79
End: 2024-12-31
Payer: MEDICARE

## 2024-12-31 DIAGNOSIS — N50.89 MASS OF RIGHT TESTICLE: ICD-10-CM

## 2024-12-31 DIAGNOSIS — R93.49 ABNORMAL RADIOLOGIC FINDINGS ON DIAGNOSTIC IMAGING OF OTHER URINARY ORGANS: ICD-10-CM

## 2024-12-31 DIAGNOSIS — N50.89 MASS OF RIGHT TESTICLE: Primary | ICD-10-CM

## 2024-12-31 LAB
ALBUMIN SERPL-MCNC: 4.2 G/DL (ref 3.5–5.2)
ALBUMIN/GLOB SERPL: 1.6 G/DL
ALP SERPL-CCNC: 47 U/L (ref 39–117)
ALPHA-FETOPROTEIN: <2 NG/ML (ref 0–8.3)
ALT SERPL W P-5'-P-CCNC: 23 U/L (ref 1–41)
ANION GAP SERPL CALCULATED.3IONS-SCNC: 10.7 MMOL/L (ref 5–15)
AST SERPL-CCNC: 18 U/L (ref 1–40)
BASOPHILS # BLD AUTO: 0.07 10*3/MM3 (ref 0–0.2)
BASOPHILS NFR BLD AUTO: 0.8 % (ref 0–1.5)
BILIRUB SERPL-MCNC: <0.2 MG/DL (ref 0–1.2)
BUN SERPL-MCNC: 60 MG/DL (ref 8–23)
BUN/CREAT SERPL: 21.9 (ref 7–25)
CALCIUM SPEC-SCNC: 9.2 MG/DL (ref 8.6–10.5)
CHLORIDE SERPL-SCNC: 107 MMOL/L (ref 98–107)
CO2 SERPL-SCNC: 22.3 MMOL/L (ref 22–29)
CREAT SERPL-MCNC: 2.74 MG/DL (ref 0.76–1.27)
DEPRECATED RDW RBC AUTO: 44.1 FL (ref 37–54)
EGFRCR SERPLBLD CKD-EPI 2021: 23.1 ML/MIN/1.73
EOSINOPHIL # BLD AUTO: 0.31 10*3/MM3 (ref 0–0.4)
EOSINOPHIL NFR BLD AUTO: 3.4 % (ref 0.3–6.2)
ERYTHROCYTE [DISTWIDTH] IN BLOOD BY AUTOMATED COUNT: 13.1 % (ref 12.3–15.4)
GLOBULIN UR ELPH-MCNC: 2.6 GM/DL
GLUCOSE SERPL-MCNC: 180 MG/DL (ref 65–99)
HCT VFR BLD AUTO: 34 % (ref 37.5–51)
HGB BLD-MCNC: 11.4 G/DL (ref 13–17.7)
IMM GRANULOCYTES # BLD AUTO: 0.05 10*3/MM3 (ref 0–0.05)
IMM GRANULOCYTES NFR BLD AUTO: 0.5 % (ref 0–0.5)
LDH SERPL-CCNC: 187 U/L (ref 135–225)
LYMPHOCYTES # BLD AUTO: 2.28 10*3/MM3 (ref 0.7–3.1)
LYMPHOCYTES NFR BLD AUTO: 25 % (ref 19.6–45.3)
MCH RBC QN AUTO: 31 PG (ref 26.6–33)
MCHC RBC AUTO-ENTMCNC: 33.5 G/DL (ref 31.5–35.7)
MCV RBC AUTO: 92.4 FL (ref 79–97)
MONOCYTES # BLD AUTO: 0.61 10*3/MM3 (ref 0.1–0.9)
MONOCYTES NFR BLD AUTO: 6.7 % (ref 5–12)
NEUTROPHILS NFR BLD AUTO: 5.81 10*3/MM3 (ref 1.7–7)
NEUTROPHILS NFR BLD AUTO: 63.6 % (ref 42.7–76)
NRBC BLD AUTO-RTO: 0 /100 WBC (ref 0–0.2)
PLATELET # BLD AUTO: 197 10*3/MM3 (ref 140–450)
PMV BLD AUTO: 11.5 FL (ref 6–12)
POTASSIUM SERPL-SCNC: 4.7 MMOL/L (ref 3.5–5.2)
PROT SERPL-MCNC: 6.8 G/DL (ref 6–8.5)
RBC # BLD AUTO: 3.68 10*6/MM3 (ref 4.14–5.8)
SODIUM SERPL-SCNC: 140 MMOL/L (ref 136–145)
WBC NRBC COR # BLD AUTO: 9.13 10*3/MM3 (ref 3.4–10.8)

## 2024-12-31 PROCEDURE — 82105 ALPHA-FETOPROTEIN SERUM: CPT

## 2024-12-31 PROCEDURE — 85025 COMPLETE CBC W/AUTO DIFF WBC: CPT

## 2024-12-31 PROCEDURE — 36415 COLL VENOUS BLD VENIPUNCTURE: CPT

## 2024-12-31 PROCEDURE — 83615 LACTATE (LD) (LDH) ENZYME: CPT

## 2024-12-31 PROCEDURE — 80053 COMPREHEN METABOLIC PANEL: CPT

## 2024-12-31 PROCEDURE — 84702 CHORIONIC GONADOTROPIN TEST: CPT

## 2025-01-02 LAB — HCG INTACT+B SERPL-ACNC: <1 MIU/ML (ref 0–3)

## 2025-01-03 ENCOUNTER — OFFICE VISIT (OUTPATIENT)
Dept: UROLOGY | Age: 80
End: 2025-01-03
Payer: MEDICARE

## 2025-01-03 VITALS — HEIGHT: 70 IN | BODY MASS INDEX: 31.78 KG/M2 | WEIGHT: 222 LBS

## 2025-01-03 DIAGNOSIS — N40.1 BENIGN PROSTATIC HYPERPLASIA WITH LOWER URINARY TRACT SYMPTOMS, SYMPTOM DETAILS UNSPECIFIED: ICD-10-CM

## 2025-01-03 DIAGNOSIS — N39.41 URGE INCONTINENCE: ICD-10-CM

## 2025-01-03 DIAGNOSIS — N50.89 SCROTAL MASS: Primary | ICD-10-CM

## 2025-01-03 DIAGNOSIS — N20.0 NEPHROLITHIASIS: ICD-10-CM

## 2025-01-03 NOTE — PROGRESS NOTES
Chief Complaint    Urologic complaint    Subjective          Telly Fitch presents to [facility] UR[facility]  History of Present Illness        Extratesticular lesions  Urge  incontinence  BPH      Patient with right flank pain for several months.  Aching a lot of the time.      12/24 , beta-hCG<1, alpha-fetoprotein< 2    12/24 2.7, GFR 23    12/24/2024 scrotal ultrasound-no intratesticular mass or torsion.  3.1 cm complex cystic mass along the medial aspect of the right testicle measured 4 cm in greatest dimension on 6/24.  2.6 cm solid mass along the lateral aspect of the right testicle measuring 1.8 in greatest mention on 6/26/2024.    3/15/2023 CT abdomen/pelvis without - right kidney has a 4 mm and 3 mm nonobstructing renal stone.  Left kidney - 12 x 5 mm, 8 mm, 4 mm obstructing stones left kidney.  Rounded fluid density masses in both kidneys likely cyst.    Gemtesa-did not help  Myrbetriq - diarrhea and headaches      On tolterodine 4 mg daily  On doxazosin, finasteride    Nocturia 3-4, urgency/frequency during the day  nitroglycerin , CAD,  IIIb chronic kidney disease    hx of renal stones.     No FH of  malignancies.    6/23 UDS - decreased sensation, max capacity 50, Q-Jean 10  VV 30, Pdet  78    3 lithotripsies        Past History:  Medical History: has a past medical history of Acute peptic ulcer, site unspecified, without hemorrhage or perforation, Basal cell carcinoma, BPH (benign prostatic hyperplasia), CAD (coronary artery disease), Cancer, Chronic kidney disease, stage 3, DJD (degenerative joint disease), Enlarged prostate without lower urinary tract symptoms (luts), Essential (primary) hypertension, Hypercholesteremia, Intervertebral disc disorders with radiculopathy, lumbar region, Iron deficiency anemia, unspecified, Mixed hyperlipidemia, Morbid (severe) obesity due to excess calories, Nephrolithiasis, Nicotine dependence, unspecified, uncomplicated, RUPESH (obstructive  sleep apnea), RAD (reactive airway disease), Sleep apnea, unspecified, Type 2 diabetes mellitus with other diabetic kidney complication, and Urinary incontinence.   Surgical History: has a past surgical history that includes Colonoscopy; Kidney stone surgery (Bilateral); Cataract extraction, bilateral; Umbilical hernia repair (N/A); Dental surgery (07/2019); Excision basal cell carcinoma (5/2023, 6/2023 6/2020; 11/12/2020; 12/2019); Other surgical history; Skin cancer destruction (Right, 07/2019); Inguinal hernia repair (Bilateral); Esophagogastroduodenoscopy (N/A, 06/06/2023); and Colonoscopy (N/A, 06/06/2023).   Family History: family history is not on file.   Social History: reports that he has never smoked. He has never been exposed to tobacco smoke. He has never used smokeless tobacco. He reports that he does not drink alcohol and does not use drugs.  Allergies: Jardiance [empagliflozin], Contrast dye (echo or unknown ct/mr), Lactose intolerance (gi), Latex, and Statins       Current Outpatient Medications:     ascorbic acid (VITAMIN C) 1000 MG tablet, 1 tablet 2 (Two) Times a Day., Disp: , Rfl:     aspirin 81 MG chewable tablet, aspirin 81 mg oral tablet,chewable chew 1 tablet (81 mg) by oral route once daily   Suspended, Disp: , Rfl:     dicyclomine (BENTYL) 20 MG tablet, As Needed., Disp: , Rfl:     doxazosin (CARDURA) 2 MG tablet, Take 4 tablets by mouth Daily., Disp: 360 tablet, Rfl: 3    ergocalciferol (ERGOCALCIFEROL) 1.25 MG (20925 UT) capsule, Take 1 capsule by mouth 1 (One) Time Per Week., Disp: 12 capsule, Rfl: 1    finasteride (PROSCAR) 5 MG tablet, Take 1 tablet by mouth Daily., Disp: 90 tablet, Rfl: 3    furosemide (LASIX) 20 MG tablet, Take 1 tablet by mouth Daily., Disp: 90 tablet, Rfl: 1    glimepiride (Amaryl) 1 MG tablet, Take 1 tablet by mouth Every Morning Before Breakfast., Disp: 90 tablet, Rfl: 1    losartan (Cozaar) 50 MG tablet, Take 1 tablet by mouth Daily., Disp: 90 tablet, Rfl: 1     metroNIDAZOLE (METROCREAM) 0.75 % cream, , Disp: , Rfl:     multivitamin (THERAGRAN) tablet tablet, Take  by mouth Daily., Disp: , Rfl:     nebivolol (Bystolic) 5 MG tablet, Take 1 tablet by mouth Daily., Disp: 90 tablet, Rfl: 1    NIFEdipine XL (PROCARDIA XL) 90 MG 24 hr tablet, Take 1 tablet by mouth Daily., Disp: 90 tablet, Rfl: 1    nitroglycerin (Nitrostat) 0.4 MG SL tablet, Place 1 tablet under the tongue Every 5 (Five) Minutes As Needed for Chest Pain. Take no more than 3 doses in 15 minutes., Disp: 30 tablet, Rfl: 1    omeprazole (PrilOSEC) 20 MG capsule, Take 1 capsule by mouth Daily., Disp: 90 capsule, Rfl: 3    pravastatin (PRAVACHOL) 20 MG tablet, Take 1 tablet by mouth Every Night., Disp: 90 tablet, Rfl: 3    Red Yeast Rice Extract 600 MG capsule, Take 1 tablet by mouth., Disp: , Rfl:     sodium bicarbonate 650 MG tablet, Take 1 tablet by mouth 3 (Three) Times a Day., Disp: , Rfl:     tolterodine LA (DETROL LA) 2 MG 24 hr capsule, Take 2 capsules by mouth Daily., Disp: 180 capsule, Rfl: 3    vitamin B-12 (CYANOCOBALAMIN) 1000 MCG tablet, Take 1 tablet by mouth Daily., Disp: 90 tablet, Rfl: 3    vitamin E 400 UNIT capsule, Take 1 capsule by mouth Daily., Disp: , Rfl:     Zinc 50 MG tablet, Take  by mouth Daily., Disp: , Rfl:      Physical exam       Alert and orient x3  Well appearing, well developed, in no acute distress   Unlabored respirations  Nontender/nondistended    Normal circumcised phallus  Descended left testicle without mass or tenderness  Right testicle descended possible small hydrocele.  Not able to identify masses.     Grossly oriented to person, place and time, judgment is intact, normal mood and affect         Objective     Vital Signs:   There were no vitals taken for this visit.             Assessment and Plan    Diagnoses and all orders for this visit:    1. Scrotal mass (Primary)    2. Urge incontinence    3. Benign prostatic hyperplasia with lower urinary tract symptoms, symptom  details unspecified      Reviewed and summarized in the chart      BPH    Continue doxazosin and finasteride        Urgency    Continue tolterodine        Extratesticular lesion    Patient has a right sided solid extratesticular lesion is growing over the last 6 months.  Not palpable on exam today.  I did discuss the possibility of removal of this lesion versus right orchiectomy if I cannot remove just the lesion.  Risks and benefits were discussed including bleeding, infection and damage to the urinary system.  We also discussed the risk of anesthesia up to and including death.  Patient voiced understanding     Patient and family are going to think about this and we will discuss further at his follow-up      History of nephrolithiasis  Right flank pain      Patient been having some fairly bothersome right flank pain for the last several months we will plan on CT abdomen/pelvis without    Follow-up after CT for further discussion of removal of scrotal lesion

## 2025-01-16 ENCOUNTER — HOSPITAL ENCOUNTER (OUTPATIENT)
Facility: HOSPITAL | Age: 80
Discharge: HOME | End: 2025-01-16
Admitting: INTERNAL MEDICINE
Payer: MEDICARE

## 2025-01-16 DIAGNOSIS — R01.1 NEWLY RECOGNIZED HEART MURMUR: ICD-10-CM

## 2025-01-16 PROCEDURE — 93306 TTE W/DOPPLER COMPLETE: CPT

## 2025-01-18 LAB
ASCENDING AORTA: 4 CM
AV MEAN PRESS GRAD SYS DOP V1V2: 6 MMHG
BH CV ECHO MEAS - ACS: 1.9 CM
BH CV ECHO MEAS - AI P1/2T: 2031 MSEC
BH CV ECHO MEAS - AO ROOT DIAM: 4.2 CM
BH CV ECHO MEAS - AO V2 VTI: 41.8 CM
BH CV ECHO MEAS - AVA(I,D): 3 CM2
BH CV ECHO MEAS - EDV(CUBED): 204.3 ML
BH CV ECHO MEAS - EDV(MOD-SP2): 155 ML
BH CV ECHO MEAS - EDV(MOD-SP4): 226 ML
BH CV ECHO MEAS - EF(MOD-SP2): 59.7 %
BH CV ECHO MEAS - EF(MOD-SP4): 67.2 %
BH CV ECHO MEAS - ESV(CUBED): 23.1 ML
BH CV ECHO MEAS - ESV(MOD-SP2): 62.5 ML
BH CV ECHO MEAS - ESV(MOD-SP4): 74.2 ML
BH CV ECHO MEAS - FS: 51.6 %
BH CV ECHO MEAS - IVS/LVPW: 0.75 CM
BH CV ECHO MEAS - IVSD: 1.13 CM
BH CV ECHO MEAS - LA DIMENSION: 3.8 CM
BH CV ECHO MEAS - LAT PEAK E' VEL: 7.6 CM/SEC
BH CV ECHO MEAS - LV MASS(C)D: 347 GRAMS
BH CV ECHO MEAS - LV MAX PG: 4.6 MMHG
BH CV ECHO MEAS - LV MEAN PG: 3 MMHG
BH CV ECHO MEAS - LV V1 MAX: 107 CM/SEC
BH CV ECHO MEAS - LV V1 VTI: 30.6 CM
BH CV ECHO MEAS - LVIDD: 5.9 CM
BH CV ECHO MEAS - LVIDS: 2.9 CM
BH CV ECHO MEAS - LVOT AREA: 4.2 CM2
BH CV ECHO MEAS - LVOT DIAM: 2.3 CM
BH CV ECHO MEAS - LVPWD: 1.51 CM
BH CV ECHO MEAS - MED PEAK E' VEL: 7.5 CM/SEC
BH CV ECHO MEAS - MV A MAX VEL: 86.5 CM/SEC
BH CV ECHO MEAS - MV DEC SLOPE: 228 CM/SEC2
BH CV ECHO MEAS - MV DEC TIME: 0.36 SEC
BH CV ECHO MEAS - MV E MAX VEL: 81.4 CM/SEC
BH CV ECHO MEAS - MV E/A: 0.94
BH CV ECHO MEAS - PA V2 MAX: 119 CM/SEC
BH CV ECHO MEAS - RV MAX PG: 2.8 MMHG
BH CV ECHO MEAS - RV V1 MAX: 83.9 CM/SEC
BH CV ECHO MEAS - RV V1 VTI: 17.7 CM
BH CV ECHO MEAS - SV(LVOT): 127.1 ML
BH CV ECHO MEAS - SV(MOD-SP2): 92.5 ML
BH CV ECHO MEAS - SV(MOD-SP4): 151.8 ML
BH CV ECHO MEAS - TAPSE (>1.6): 3.5 CM
BH CV ECHO MEASUREMENTS AVERAGE E/E' RATIO: 10.78
BH CV XLRA - TDI S': 15.7 CM/SEC
IVRT: 67 MS
LV EF BIPLANE MOD: 63.5 %

## 2025-01-20 ENCOUNTER — HOSPITAL ENCOUNTER (OUTPATIENT)
Dept: CT IMAGING | Facility: HOSPITAL | Age: 80
Discharge: HOME | End: 2025-01-20
Admitting: UROLOGY
Payer: MEDICARE

## 2025-01-20 DIAGNOSIS — N20.0 NEPHROLITHIASIS: ICD-10-CM

## 2025-01-20 PROCEDURE — 74176 CT ABD & PELVIS W/O CONTRAST: CPT

## 2025-01-27 PROBLEM — R39.15 URGENCY OF URINATION: Status: ACTIVE | Noted: 2025-01-27

## 2025-01-27 NOTE — PROGRESS NOTES
Chief Complaint    Urologic complaint    Subjective          Telly Fitch presents to [facility] UR[facility]  History of Present Illness      Nephrolithiasis  Extratesticular lesions  Urge  incontinence  BPH      1/21/2025 CT abdomen/pelvis without - bilateral nonobstructing nephrolithiasis.  2 - 4 mm stones nonobstructing right kidney.  6 mm, 4 mm, a 3 mm stones left kidney nonobstructing.  Passed a stone since his last visit    Right flank pain has resolved.    No GH      On tolterodine 4 mg daily -patient is dealing with quite a bit of leakage.  Helping.   2 pads daily.  Insensate incontinence.  On doxazosin, finasteride  No straining    Nocturia 3-4, urgency/frequency during the day  nitroglycerin , CAD,  IIIb chronic kidney disease      Patient's past more than 10 stones, 3 lithotripsies          Previous      Patient with right flank pain for several months.  Aching a lot of the time.      12/24 , beta-hCG<1, alpha-fetoprotein< 2    12/24 2.7, GFR 23    12/24/2024 scrotal ultrasound-no intratesticular mass or torsion.  3.1 cm complex cystic mass along the medial aspect of the right testicle measured 4 cm in greatest dimension on 6/24.  2.6 cm solid mass along the lateral aspect of the right testicle measuring 1.8 in greatest mention on 6/26/2024.    3/15/2023 CT abdomen/pelvis without - right kidney has a 4 mm and 3 mm nonobstructing renal stone.  Left kidney - 12 x 5 mm, 8 mm, 4 mm obstructing stones left kidney.  Rounded fluid density masses in both kidneys likely cyst.    Gemtesa-did not help  Myrbetriq - diarrhea and headaches        hx of renal stones.     No FH of  malignancies.    6/23 UDS - decreased sensation, max capacity 50, Q-Jean 10  VV 30, Pdet  78          Past History:  Medical History: has a past medical history of Acute peptic ulcer, site unspecified, without hemorrhage or perforation, Basal cell carcinoma, BPH (benign prostatic hyperplasia), CAD (coronary artery  disease), Cancer, Chronic kidney disease, stage 3, DJD (degenerative joint disease), Enlarged prostate without lower urinary tract symptoms (luts), Essential (primary) hypertension, Hypercholesteremia, Intervertebral disc disorders with radiculopathy, lumbar region, Iron deficiency anemia, unspecified, Mixed hyperlipidemia, Morbid (severe) obesity due to excess calories, Nephrolithiasis, Nicotine dependence, unspecified, uncomplicated, RUPESH (obstructive sleep apnea), RAD (reactive airway disease), Sleep apnea, unspecified, Type 2 diabetes mellitus with other diabetic kidney complication, and Urinary incontinence.   Surgical History: has a past surgical history that includes Colonoscopy; Kidney stone surgery (Bilateral); Cataract extraction, bilateral; Umbilical hernia repair (N/A); Dental surgery (07/2019); Excision basal cell carcinoma (5/2023, 6/2023 6/2020; 11/12/2020; 12/2019); Other surgical history; Skin cancer destruction (Right, 07/2019); Inguinal hernia repair (Bilateral); Esophagogastroduodenoscopy (N/A, 06/06/2023); and Colonoscopy (N/A, 06/06/2023).   Family History: family history is not on file.   Social History: reports that he has never smoked. He has never been exposed to tobacco smoke. He has never used smokeless tobacco. He reports that he does not drink alcohol and does not use drugs.  Allergies: Jardiance [empagliflozin], Contrast dye (echo or unknown ct/mr), Lactose intolerance (gi), Latex, and Statins              Objective     Vital Signs:   There were no vitals taken for this visit.             Assessment and Plan    Diagnoses and all orders for this visit:    1. Nephrolithiasis (Primary)    2. Benign prostatic hyperplasia with lower urinary tract symptoms, symptom details unspecified    3. Urgency of urination      Reviewed and summarized in the chart      BPH    Continue doxazosin and finasteride        Urgency    Continue tolterodine      We did discuss possibility of cystoscopy today to  help with workup to see if we can make his incontinence or urination better.  This time patient is going to leave alone and live with pads.  He will let me know if he changes mind          Extratesticular lesion      Discussed the risk and benefits of extratesticular lesion.  At this time after discussion we will follow this conservatively.  Follow-up in 6 months with scrotal ultrasound.          History of nephrolithiasis  Right flank pain      Pain Has resolved, patient passed a stone.  The patient was counseled on the preventative measures of kidney stones today.  This included increasing fluid intake to make at least 1.5 ml daily, decreasing meat intake, decreasing salt intake and taking in a normal amount of calcium (1000 mg daily).  Information handout given on this today.      We also discussed the DASH diet today for stone prevention and handout was given    Not currently interested in metabolic stone workup

## 2025-01-28 ENCOUNTER — TELEPHONE (OUTPATIENT)
Dept: INTERNAL MEDICINE | Age: 80
End: 2025-01-28
Payer: MEDICARE

## 2025-01-29 RX ORDER — LANOLIN ALCOHOL/MO/W.PET/CERES
1000 CREAM (GRAM) TOPICAL DAILY
Qty: 90 TABLET | Refills: 3 | Status: SHIPPED | OUTPATIENT
Start: 2025-01-29

## 2025-01-31 ENCOUNTER — OFFICE VISIT (OUTPATIENT)
Dept: UROLOGY | Age: 80
End: 2025-01-31
Payer: MEDICARE

## 2025-01-31 VITALS — BODY MASS INDEX: 32.28 KG/M2 | WEIGHT: 225.5 LBS | HEIGHT: 70 IN

## 2025-01-31 DIAGNOSIS — N50.89 SCROTAL MASS: ICD-10-CM

## 2025-01-31 DIAGNOSIS — R39.15 URGENCY OF URINATION: ICD-10-CM

## 2025-01-31 DIAGNOSIS — N20.0 NEPHROLITHIASIS: ICD-10-CM

## 2025-01-31 DIAGNOSIS — N40.1 BENIGN PROSTATIC HYPERPLASIA WITH LOWER URINARY TRACT SYMPTOMS, SYMPTOM DETAILS UNSPECIFIED: Primary | ICD-10-CM

## 2025-01-31 LAB — URINE VOLUME: 0

## 2025-02-03 RX ORDER — FUROSEMIDE 20 MG/1
20 TABLET ORAL DAILY
Qty: 90 TABLET | Refills: 3 | Status: SHIPPED | OUTPATIENT
Start: 2025-02-03

## 2025-02-03 RX ORDER — NIFEDIPINE 90 MG/1
90 TABLET, EXTENDED RELEASE ORAL DAILY
Qty: 90 TABLET | Refills: 3 | Status: SHIPPED | OUTPATIENT
Start: 2025-02-03

## 2025-02-10 ENCOUNTER — LAB (OUTPATIENT)
Facility: HOSPITAL | Age: 80
End: 2025-02-10
Payer: MEDICARE

## 2025-02-10 ENCOUNTER — TRANSCRIBE ORDERS (OUTPATIENT)
Dept: ADMINISTRATIVE | Facility: HOSPITAL | Age: 80
End: 2025-02-10
Payer: MEDICARE

## 2025-02-10 DIAGNOSIS — N18.32 CHRONIC KIDNEY DISEASE (CKD) STAGE G3B/A1, MODERATELY DECREASED GLOMERULAR FILTRATION RATE (GFR) BETWEEN 30-44 ML/MIN/1.73 SQUARE METER AND ALBUMINURIA CREATININE RATIO LESS THAN 30 MG/G (CMS/H*: ICD-10-CM

## 2025-02-10 DIAGNOSIS — N18.32 CHRONIC KIDNEY DISEASE (CKD) STAGE G3B/A1, MODERATELY DECREASED GLOMERULAR FILTRATION RATE (GFR) BETWEEN 30-44 ML/MIN/1.73 SQUARE METER AND ALBUMINURIA CREATININE RATIO LESS THAN 30 MG/G (CMS/H*: Primary | ICD-10-CM

## 2025-02-10 LAB
ALBUMIN SERPL-MCNC: 4.3 G/DL (ref 3.5–5.2)
ANION GAP SERPL CALCULATED.3IONS-SCNC: 13.6 MMOL/L (ref 5–15)
BACTERIA UR QL AUTO: ABNORMAL /HPF
BASOPHILS # BLD AUTO: 0.08 10*3/MM3 (ref 0–0.2)
BASOPHILS NFR BLD AUTO: 0.9 % (ref 0–1.5)
BILIRUB UR QL STRIP: NEGATIVE
BUN SERPL-MCNC: 49 MG/DL (ref 8–23)
BUN/CREAT SERPL: 19.6 (ref 7–25)
CALCIUM SPEC-SCNC: 9.2 MG/DL (ref 8.6–10.5)
CHLORIDE SERPL-SCNC: 104 MMOL/L (ref 98–107)
CLARITY UR: CLEAR
CO2 SERPL-SCNC: 22.4 MMOL/L (ref 22–29)
COLOR UR: YELLOW
CREAT SERPL-MCNC: 2.5 MG/DL (ref 0.76–1.27)
CREAT UR-MCNC: 40.7 MG/DL
DEPRECATED RDW RBC AUTO: 41.4 FL (ref 37–54)
EGFRCR SERPLBLD CKD-EPI 2021: 25.8 ML/MIN/1.73
EOSINOPHIL # BLD AUTO: 0.21 10*3/MM3 (ref 0–0.4)
EOSINOPHIL NFR BLD AUTO: 2.3 % (ref 0.3–6.2)
ERYTHROCYTE [DISTWIDTH] IN BLOOD BY AUTOMATED COUNT: 12.6 % (ref 12.3–15.4)
GLUCOSE SERPL-MCNC: 85 MG/DL (ref 65–99)
GLUCOSE UR STRIP-MCNC: NEGATIVE MG/DL
HCT VFR BLD AUTO: 35.9 % (ref 37.5–51)
HGB BLD-MCNC: 12.3 G/DL (ref 13–17.7)
HGB UR QL STRIP.AUTO: NEGATIVE
HYALINE CASTS UR QL AUTO: ABNORMAL /LPF
IMM GRANULOCYTES # BLD AUTO: 0.04 10*3/MM3 (ref 0–0.05)
IMM GRANULOCYTES NFR BLD AUTO: 0.4 % (ref 0–0.5)
KETONES UR QL STRIP: NEGATIVE
LEUKOCYTE ESTERASE UR QL STRIP.AUTO: ABNORMAL
LYMPHOCYTES # BLD AUTO: 2.46 10*3/MM3 (ref 0.7–3.1)
LYMPHOCYTES NFR BLD AUTO: 27.5 % (ref 19.6–45.3)
MCH RBC QN AUTO: 31.1 PG (ref 26.6–33)
MCHC RBC AUTO-ENTMCNC: 34.3 G/DL (ref 31.5–35.7)
MCV RBC AUTO: 90.7 FL (ref 79–97)
MONOCYTES # BLD AUTO: 0.58 10*3/MM3 (ref 0.1–0.9)
MONOCYTES NFR BLD AUTO: 6.5 % (ref 5–12)
NEUTROPHILS NFR BLD AUTO: 5.57 10*3/MM3 (ref 1.7–7)
NEUTROPHILS NFR BLD AUTO: 62.4 % (ref 42.7–76)
NITRITE UR QL STRIP: NEGATIVE
NRBC BLD AUTO-RTO: 0 /100 WBC (ref 0–0.2)
PH UR STRIP.AUTO: 6 [PH] (ref 5–8)
PHOSPHATE SERPL-MCNC: 4.2 MG/DL (ref 2.5–4.5)
PLATELET # BLD AUTO: 191 10*3/MM3 (ref 140–450)
PMV BLD AUTO: 11.7 FL (ref 6–12)
POTASSIUM SERPL-SCNC: 5.2 MMOL/L (ref 3.5–5.2)
PROT ?TM UR-MCNC: 70.3 MG/DL
PROT UR QL STRIP: ABNORMAL
PROT/CREAT UR: 1.73 MG/G{CREAT}
RBC # BLD AUTO: 3.96 10*6/MM3 (ref 4.14–5.8)
RBC # UR STRIP: ABNORMAL /HPF
REF LAB TEST METHOD: ABNORMAL
SODIUM SERPL-SCNC: 140 MMOL/L (ref 136–145)
SP GR UR STRIP: 1.01 (ref 1–1.03)
SQUAMOUS #/AREA URNS HPF: ABNORMAL /HPF
UROBILINOGEN UR QL STRIP: ABNORMAL
WBC # UR STRIP: ABNORMAL /HPF
WBC NRBC COR # BLD AUTO: 8.94 10*3/MM3 (ref 3.4–10.8)

## 2025-02-10 PROCEDURE — 84156 ASSAY OF PROTEIN URINE: CPT

## 2025-02-10 PROCEDURE — 82570 ASSAY OF URINE CREATININE: CPT

## 2025-02-10 PROCEDURE — 36415 COLL VENOUS BLD VENIPUNCTURE: CPT

## 2025-02-10 PROCEDURE — 81001 URINALYSIS AUTO W/SCOPE: CPT

## 2025-02-10 PROCEDURE — 85025 COMPLETE CBC W/AUTO DIFF WBC: CPT

## 2025-02-10 PROCEDURE — 80069 RENAL FUNCTION PANEL: CPT

## 2025-03-26 DIAGNOSIS — N39.41 URGE INCONTINENCE: ICD-10-CM

## 2025-03-26 RX ORDER — TOLTERODINE 2 MG/1
4 CAPSULE, EXTENDED RELEASE ORAL DAILY
Qty: 180 CAPSULE | Refills: 3 | Status: SHIPPED | OUTPATIENT
Start: 2025-03-26

## 2025-03-26 RX ORDER — LOSARTAN POTASSIUM 50 MG/1
50 TABLET ORAL DAILY
Qty: 90 TABLET | Refills: 3 | Status: SHIPPED | OUTPATIENT
Start: 2025-03-26

## 2025-04-08 ENCOUNTER — TELEPHONE (OUTPATIENT)
Age: 80
End: 2025-04-08
Payer: MEDICARE

## 2025-04-08 RX ORDER — FINASTERIDE 5 MG/1
5 TABLET, FILM COATED ORAL DAILY
Qty: 90 TABLET | Refills: 3 | Status: SHIPPED | OUTPATIENT
Start: 2025-04-08

## 2025-04-28 RX ORDER — NEBIVOLOL 5 MG/1
5 TABLET ORAL DAILY
Qty: 90 TABLET | Refills: 1 | Status: SHIPPED | OUTPATIENT
Start: 2025-04-28

## 2025-05-05 ENCOUNTER — TELEPHONE (OUTPATIENT)
Age: 80
End: 2025-05-05
Payer: MEDICARE

## 2025-05-05 RX ORDER — ERGOCALCIFEROL 1.25 MG/1
50000 CAPSULE ORAL WEEKLY
Qty: 12 CAPSULE | Refills: 3 | Status: SHIPPED | OUTPATIENT
Start: 2025-05-05

## 2025-05-05 NOTE — TELEPHONE ENCOUNTER
Pt needs a refill of ergocalciferol 1.25 mg sent to [facility] on [address].

## 2025-05-20 ENCOUNTER — TELEPHONE (OUTPATIENT)
Age: 80
End: 2025-05-20
Payer: MEDICARE

## 2025-05-20 RX ORDER — DOXAZOSIN 2 MG/1
8 TABLET ORAL EVERY 24 HOURS
Qty: 360 TABLET | Refills: 3 | Status: SHIPPED | OUTPATIENT
Start: 2025-05-20

## 2025-05-20 NOTE — TELEPHONE ENCOUNTER
Needs a refill for doxazosin 2mg sent to [facility] pharmacy as he is out of the medication.

## 2025-05-28 ENCOUNTER — LAB (OUTPATIENT)
Facility: HOSPITAL | Age: 80
End: 2025-05-28
Payer: MEDICARE

## 2025-05-28 ENCOUNTER — TRANSCRIBE ORDERS (OUTPATIENT)
Dept: ADMINISTRATIVE | Facility: HOSPITAL | Age: 80
End: 2025-05-28
Payer: MEDICARE

## 2025-05-28 DIAGNOSIS — E55.9 VITAMIN D DEFICIENCY, UNSPECIFIED: ICD-10-CM

## 2025-05-28 DIAGNOSIS — N18.4 CKD (CHRONIC KIDNEY DISEASE), STAGE IV: ICD-10-CM

## 2025-05-28 DIAGNOSIS — R80.1 PERSISTENT PROTEINURIA: ICD-10-CM

## 2025-05-28 DIAGNOSIS — N18.4 CKD (CHRONIC KIDNEY DISEASE), STAGE IV: Primary | ICD-10-CM

## 2025-05-28 LAB
25(OH)D3 SERPL-MCNC: 45.3 NG/ML (ref 30–100)
ALBUMIN SERPL-MCNC: 4.5 G/DL (ref 3.5–5.2)
ANION GAP SERPL CALCULATED.3IONS-SCNC: 11.1 MMOL/L (ref 5–15)
BACTERIA UR QL AUTO: ABNORMAL /HPF
BASOPHILS # BLD AUTO: 0.06 10*3/MM3 (ref 0–0.2)
BASOPHILS NFR BLD AUTO: 0.6 % (ref 0–1.5)
BILIRUB UR QL STRIP: NEGATIVE
BUN SERPL-MCNC: 47 MG/DL (ref 8–23)
BUN/CREAT SERPL: 19.7 (ref 7–25)
CALCIUM SPEC-SCNC: 9.9 MG/DL (ref 8.6–10.5)
CHLORIDE SERPL-SCNC: 108 MMOL/L (ref 98–107)
CLARITY UR: CLEAR
CO2 SERPL-SCNC: 20.9 MMOL/L (ref 22–29)
COLOR UR: YELLOW
CREAT SERPL-MCNC: 2.39 MG/DL (ref 0.76–1.27)
CREAT UR-MCNC: 22.6 MG/DL
DEPRECATED RDW RBC AUTO: 44.5 FL (ref 37–54)
EGFRCR SERPLBLD CKD-EPI 2021: 27.2 ML/MIN/1.73
EOSINOPHIL # BLD AUTO: 0.25 10*3/MM3 (ref 0–0.4)
EOSINOPHIL NFR BLD AUTO: 2.6 % (ref 0.3–6.2)
ERYTHROCYTE [DISTWIDTH] IN BLOOD BY AUTOMATED COUNT: 13.2 % (ref 12.3–15.4)
GLUCOSE SERPL-MCNC: 135 MG/DL (ref 65–99)
GLUCOSE UR STRIP-MCNC: NEGATIVE MG/DL
HCT VFR BLD AUTO: 37.2 % (ref 37.5–51)
HGB BLD-MCNC: 12.5 G/DL (ref 13–17.7)
HGB UR QL STRIP.AUTO: ABNORMAL
HYALINE CASTS UR QL AUTO: ABNORMAL /LPF
IMM GRANULOCYTES # BLD AUTO: 0.04 10*3/MM3 (ref 0–0.05)
IMM GRANULOCYTES NFR BLD AUTO: 0.4 % (ref 0–0.5)
KETONES UR QL STRIP: NEGATIVE
LEUKOCYTE ESTERASE UR QL STRIP.AUTO: ABNORMAL
LYMPHOCYTES # BLD AUTO: 2.28 10*3/MM3 (ref 0.7–3.1)
LYMPHOCYTES NFR BLD AUTO: 23.8 % (ref 19.6–45.3)
MCH RBC QN AUTO: 30.9 PG (ref 26.6–33)
MCHC RBC AUTO-ENTMCNC: 33.6 G/DL (ref 31.5–35.7)
MCV RBC AUTO: 91.9 FL (ref 79–97)
MONOCYTES # BLD AUTO: 0.59 10*3/MM3 (ref 0.1–0.9)
MONOCYTES NFR BLD AUTO: 6.2 % (ref 5–12)
NEUTROPHILS NFR BLD AUTO: 6.36 10*3/MM3 (ref 1.7–7)
NEUTROPHILS NFR BLD AUTO: 66.4 % (ref 42.7–76)
NITRITE UR QL STRIP: NEGATIVE
NRBC BLD AUTO-RTO: 0 /100 WBC (ref 0–0.2)
PH UR STRIP.AUTO: 6 [PH] (ref 5–8)
PHOSPHATE SERPL-MCNC: 3.8 MG/DL (ref 2.5–4.5)
PLATELET # BLD AUTO: 191 10*3/MM3 (ref 140–450)
PMV BLD AUTO: 11.1 FL (ref 6–12)
POTASSIUM SERPL-SCNC: 4.8 MMOL/L (ref 3.5–5.2)
PROT ?TM UR-MCNC: 41.9 MG/DL
PROT UR QL STRIP: ABNORMAL
PROT/CREAT UR: 1.85 MG/G{CREAT}
PTH-INTACT SERPL-MCNC: 26.2 PG/ML (ref 15–65)
RBC # BLD AUTO: 4.05 10*6/MM3 (ref 4.14–5.8)
RBC # UR STRIP: ABNORMAL /HPF
REF LAB TEST METHOD: ABNORMAL
SODIUM SERPL-SCNC: 140 MMOL/L (ref 136–145)
SP GR UR STRIP: 1.01 (ref 1–1.03)
SQUAMOUS #/AREA URNS HPF: ABNORMAL /HPF
UROBILINOGEN UR QL STRIP: ABNORMAL
WBC # UR STRIP: ABNORMAL /HPF
WBC NRBC COR # BLD AUTO: 9.58 10*3/MM3 (ref 3.4–10.8)

## 2025-05-28 PROCEDURE — 82784 ASSAY IGA/IGD/IGG/IGM EACH: CPT

## 2025-05-28 PROCEDURE — 85025 COMPLETE CBC W/AUTO DIFF WBC: CPT

## 2025-05-28 PROCEDURE — 83970 ASSAY OF PARATHORMONE: CPT

## 2025-05-28 PROCEDURE — 81001 URINALYSIS AUTO W/SCOPE: CPT

## 2025-05-28 PROCEDURE — 82306 VITAMIN D 25 HYDROXY: CPT

## 2025-05-28 PROCEDURE — 82570 ASSAY OF URINE CREATININE: CPT

## 2025-05-28 PROCEDURE — 86335 IMMUNFIX E-PHORSIS/URINE/CSF: CPT

## 2025-05-28 PROCEDURE — 36415 COLL VENOUS BLD VENIPUNCTURE: CPT

## 2025-05-28 PROCEDURE — 84156 ASSAY OF PROTEIN URINE: CPT

## 2025-05-28 PROCEDURE — 80069 RENAL FUNCTION PANEL: CPT

## 2025-05-28 PROCEDURE — 86334 IMMUNOFIX E-PHORESIS SERUM: CPT

## 2025-05-30 LAB
IGA SERPL-MCNC: 140 MG/DL (ref 61–437)
IGG SERPL-MCNC: 656 MG/DL (ref 603–1613)
IGM SERPL-MCNC: 25 MG/DL (ref 15–143)
INTERPRETATION UR IFE-IMP: NORMAL
PROT PATTERN SERPL IFE-IMP: NORMAL

## 2025-06-02 RX ORDER — GLIMEPIRIDE 1 MG/1
1 TABLET ORAL
Qty: 90 TABLET | Refills: 1 | Status: SHIPPED | OUTPATIENT
Start: 2025-06-02

## 2025-06-25 ENCOUNTER — LAB (OUTPATIENT)
Dept: LAB | Facility: HOSPITAL | Age: 80
End: 2025-06-25
Payer: MEDICARE

## 2025-06-25 ENCOUNTER — OFFICE VISIT (OUTPATIENT)
Dept: ORTHOPEDIC SURGERY | Facility: CLINIC | Age: 80
End: 2025-06-25
Payer: MEDICARE

## 2025-06-25 VITALS
OXYGEN SATURATION: 92 % | HEART RATE: 50 BPM | SYSTOLIC BLOOD PRESSURE: 129 MMHG | BODY MASS INDEX: 32.21 KG/M2 | WEIGHT: 225 LBS | DIASTOLIC BLOOD PRESSURE: 67 MMHG | HEIGHT: 70 IN

## 2025-06-25 DIAGNOSIS — E11.9 TYPE 2 DIABETES MELLITUS WITHOUT COMPLICATION, WITHOUT LONG-TERM CURRENT USE OF INSULIN: ICD-10-CM

## 2025-06-25 DIAGNOSIS — D50.8 IRON DEFICIENCY ANEMIA SECONDARY TO INADEQUATE DIETARY IRON INTAKE: ICD-10-CM

## 2025-06-25 DIAGNOSIS — N18.32 STAGE 3B CHRONIC KIDNEY DISEASE: ICD-10-CM

## 2025-06-25 DIAGNOSIS — E78.2 MIXED HYPERLIPIDEMIA: ICD-10-CM

## 2025-06-25 DIAGNOSIS — M79.642 LEFT HAND PAIN: Primary | ICD-10-CM

## 2025-06-25 DIAGNOSIS — I10 BENIGN ESSENTIAL HTN: ICD-10-CM

## 2025-06-25 DIAGNOSIS — M18.11 ARTHRITIS OF CARPOMETACARPAL (CMC) JOINT OF RIGHT THUMB: ICD-10-CM

## 2025-06-25 LAB
ALBUMIN SERPL-MCNC: 4.2 G/DL (ref 3.5–5.2)
ALBUMIN/GLOB SERPL: 1.8 G/DL
ALP SERPL-CCNC: 44 U/L (ref 39–117)
ALT SERPL W P-5'-P-CCNC: 30 U/L (ref 1–41)
ANION GAP SERPL CALCULATED.3IONS-SCNC: 10.9 MMOL/L (ref 5–15)
AST SERPL-CCNC: 27 U/L (ref 1–40)
BASOPHILS # BLD AUTO: 0.03 10*3/MM3 (ref 0–0.2)
BASOPHILS NFR BLD AUTO: 0.4 % (ref 0–1.5)
BILIRUB SERPL-MCNC: 0.4 MG/DL (ref 0–1.2)
BUN SERPL-MCNC: 44 MG/DL (ref 8–23)
BUN/CREAT SERPL: 18.4 (ref 7–25)
CALCIUM SPEC-SCNC: 9.9 MG/DL (ref 8.6–10.5)
CHLORIDE SERPL-SCNC: 105 MMOL/L (ref 98–107)
CHOLEST SERPL-MCNC: 134 MG/DL (ref 0–200)
CO2 SERPL-SCNC: 21.1 MMOL/L (ref 22–29)
CREAT SERPL-MCNC: 2.39 MG/DL (ref 0.76–1.27)
DEPRECATED RDW RBC AUTO: 44.3 FL (ref 37–54)
EGFRCR SERPLBLD CKD-EPI 2021: 27.2 ML/MIN/1.73
EOSINOPHIL # BLD AUTO: 0.17 10*3/MM3 (ref 0–0.4)
EOSINOPHIL NFR BLD AUTO: 2.2 % (ref 0.3–6.2)
ERYTHROCYTE [DISTWIDTH] IN BLOOD BY AUTOMATED COUNT: 13.1 % (ref 12.3–15.4)
FERRITIN SERPL-MCNC: 305 NG/ML (ref 30–400)
GLOBULIN UR ELPH-MCNC: 2.3 GM/DL
GLUCOSE SERPL-MCNC: 117 MG/DL (ref 65–99)
HBA1C MFR BLD: 6.9 % (ref 4.8–5.6)
HCT VFR BLD AUTO: 34.8 % (ref 37.5–51)
HDLC SERPL-MCNC: 23 MG/DL (ref 40–60)
HGB BLD-MCNC: 11.6 G/DL (ref 13–17.7)
IMM GRANULOCYTES # BLD AUTO: 0.03 10*3/MM3 (ref 0–0.05)
IMM GRANULOCYTES NFR BLD AUTO: 0.4 % (ref 0–0.5)
IRON 24H UR-MRATE: 99 MCG/DL (ref 59–158)
IRON SATN MFR SERPL: 29 % (ref 20–50)
LDLC SERPL CALC-MCNC: 62 MG/DL (ref 0–100)
LDLC/HDLC SERPL: 2.12 {RATIO}
LYMPHOCYTES # BLD AUTO: 1.38 10*3/MM3 (ref 0.7–3.1)
LYMPHOCYTES NFR BLD AUTO: 18.1 % (ref 19.6–45.3)
MCH RBC QN AUTO: 30.7 PG (ref 26.6–33)
MCHC RBC AUTO-ENTMCNC: 33.3 G/DL (ref 31.5–35.7)
MCV RBC AUTO: 92.1 FL (ref 79–97)
MONOCYTES # BLD AUTO: 0.52 10*3/MM3 (ref 0.1–0.9)
MONOCYTES NFR BLD AUTO: 6.8 % (ref 5–12)
NEUTROPHILS NFR BLD AUTO: 5.48 10*3/MM3 (ref 1.7–7)
NEUTROPHILS NFR BLD AUTO: 72.1 % (ref 42.7–76)
NRBC BLD AUTO-RTO: 0 /100 WBC (ref 0–0.2)
PLATELET # BLD AUTO: 183 10*3/MM3 (ref 140–450)
PMV BLD AUTO: 11.3 FL (ref 6–12)
POTASSIUM SERPL-SCNC: 5.2 MMOL/L (ref 3.5–5.2)
PROT SERPL-MCNC: 6.5 G/DL (ref 6–8.5)
RBC # BLD AUTO: 3.78 10*6/MM3 (ref 4.14–5.8)
SODIUM SERPL-SCNC: 137 MMOL/L (ref 136–145)
TIBC SERPL-MCNC: 340 MCG/DL (ref 298–536)
TRANSFERRIN SERPL-MCNC: 228 MG/DL (ref 200–360)
TRIGL SERPL-MCNC: 311 MG/DL (ref 0–150)
TSH SERPL DL<=0.05 MIU/L-ACNC: 2.31 UIU/ML (ref 0.27–4.2)
VLDLC SERPL-MCNC: 49 MG/DL (ref 5–40)
WBC NRBC COR # BLD AUTO: 7.61 10*3/MM3 (ref 3.4–10.8)

## 2025-06-25 PROCEDURE — 83540 ASSAY OF IRON: CPT

## 2025-06-25 PROCEDURE — 80061 LIPID PANEL: CPT

## 2025-06-25 PROCEDURE — 84443 ASSAY THYROID STIM HORMONE: CPT

## 2025-06-25 PROCEDURE — 85025 COMPLETE CBC W/AUTO DIFF WBC: CPT

## 2025-06-25 PROCEDURE — 36415 COLL VENOUS BLD VENIPUNCTURE: CPT

## 2025-06-25 PROCEDURE — 83036 HEMOGLOBIN GLYCOSYLATED A1C: CPT

## 2025-06-25 PROCEDURE — 84466 ASSAY OF TRANSFERRIN: CPT

## 2025-06-25 PROCEDURE — 82728 ASSAY OF FERRITIN: CPT

## 2025-06-25 PROCEDURE — 80053 COMPREHEN METABOLIC PANEL: CPT

## 2025-06-25 RX ORDER — LIDOCAINE HYDROCHLORIDE 10 MG/ML
1 INJECTION, SOLUTION INFILTRATION; PERINEURAL
Status: COMPLETED | OUTPATIENT
Start: 2025-06-25 | End: 2025-06-25

## 2025-06-25 RX ORDER — TRIAMCINOLONE ACETONIDE 40 MG/ML
40 INJECTION, SUSPENSION INTRA-ARTICULAR; INTRAMUSCULAR
Status: COMPLETED | OUTPATIENT
Start: 2025-06-25 | End: 2025-06-25

## 2025-06-25 RX ADMIN — LIDOCAINE HYDROCHLORIDE 1 ML: 10 INJECTION, SOLUTION INFILTRATION; PERINEURAL at 13:45

## 2025-06-25 RX ADMIN — TRIAMCINOLONE ACETONIDE 40 MG: 40 INJECTION, SUSPENSION INTRA-ARTICULAR; INTRAMUSCULAR at 13:45

## 2025-06-25 NOTE — PROGRESS NOTES
"Chief Complaint  Initial Evaluation of the Left Hand     Subjective      Telly Fitch presents to [facility] for initial evaluation of the left hand.  He fell back in March and landed on his left hand.  He went to the ER and had an X ray and was wearing a brace.  He is still having some pain in the left wrist again.  He has pain from the thumb, through the wrist to the elbow.      Allergies   Allergen Reactions    Jardiance [Empagliflozin] Swelling     In scrotum area    Contrast Dye (Echo Or Unknown Ct/Mr) Rash     Red rash, with itching    Lactose Intolerance (Gi) Diarrhea    Latex Rash     Skins peeling    Statins Unknown - Low Severity     Other reaction(s): Myalgia, Other: tolerates Pravastatin per pt, Myalgia, Other: tolerates Pravastatin per pt        Social History     Socioeconomic History    Marital status:    Tobacco Use    Smoking status: Never     Passive exposure: Never    Smokeless tobacco: Never   Vaping Use    Vaping status: Never Used   Substance and Sexual Activity    Alcohol use: Never    Drug use: Never    Sexual activity: Defer        I reviewed the patient's chief complaint, history of present illness, review of systems, past medical history, surgical history, family history, social history, medications, and allergy list.     Review of Systems     Constitutional: Denies fevers, chills, weight loss  Cardiovascular: Denies chest pain, shortness of breath  Skin: Denies rashes, acute skin changes  Neurologic: Denies headache, loss of consciousness        Vital Signs:   /67   Pulse 50   Ht 177.8 cm (70\")   Wt 102 kg (225 lb)   SpO2 92%   BMI 32.28 kg/m²          Physical Exam  General: Alert. No acute distress    Ortho Exam        LEFT HAND Negative Compression testing/ Negative Tinels. NegativeFinkelsteins. Negative Cardona's testing. Positive CMC grind testing. Negative Phalens. Full ROM of the hand, fingers, elbow and wrist. Negative " Triggering of the digit. Sensation grossly intact to light touch, median, radial and ulnar nerve. Positive AIN, PIN and ulnar nerve motor function intact. Axillary nerve intact. Positive pulses.        Small Joint: L thumb CMC  Consent given by: patient  Site marked: site marked  Timeout: Immediately prior to procedure a time out was called to verify the correct patient, procedure, equipment, support staff and site/side marked as required   Supporting Documentation  Indications: pain   Procedure Details  Location: thumb - L thumb CMC  Preparation: Patient was prepped and draped in the usual sterile fashion  Needle gauge: 23g.  Medications administered: 1 mL lidocaine 1 %; 40 mg triamcinolone acetonide 40 MG/ML  Patient tolerance: patient tolerated the procedure well with no immediate complications      This injection documentation was Scribed for Darrius Meredith MD by Gladys Chaudhary MA.  06/25/25   14:17 EDT        Imaging Results (Most Recent)       Procedure Component Value Units Date/Time    XR Hand 2 View Left - In process [[id number]] Resulted: 06/25/25 1354     Updated: 06/25/25 1402    This result has not been signed. Information might be incomplete.               Result Review :     X-Ray Report:  Left hand  X-Ray  Indication: Evaluation of the left hand  AP/Lateral view(s)  Findings: Advanced bone on bone CMC arthritis.   Prior studies available for comparison: no             Assessment and Plan     Diagnoses and all orders for this visit:    1. Left hand pain (Primary)  -     XR Hand 2 View Left  -     Small Joint: L thumb CMC    2. Arthritis of carpometacarpal (CMC) joint of left thumb  -     Small Joint: L thumb CMC        Discussed the treatment plan with the patient. I reviewed the X-rays that were obtained today with the patient.     Thumb o pren brace given.      Discussed the treatment options with the patient, operative vs non-operative. Discussed referral to specialist if wanted surgical  intervention.     Discussed the risks and benefits of conservative measures.  The patient expressed understanding and wished to proceed with a left thumb CMC injection.  He tolerated the injection well.     Discussed with the patient that due to the steroid injection given today in the office they may see an increase in blood sugar for a few days. Advised patient to monitor sugar after receiving the injection.     Discussed possibility of a reaction from the injection.  Discussed the possibility that the injection may not completely improve or remove the pain.  Discussed the risk of infection.        Call or return if worsening symptoms.    Follow Up     PRN      Patient was given instructions and counseling regarding his condition or for health maintenance advice. Please see specific information pulled into the AVS if appropriate.     Scribed for Darrius Meredith MD by Vanessa Golden MA.  06/25/25   13:55 EDT      I have personally performed the services described in this document as scribed by the above individual and it is both accurate and complete. Darrius Meredith MD 06/25/25

## 2025-06-30 ENCOUNTER — OFFICE VISIT (OUTPATIENT)
Age: 80
End: 2025-06-30
Payer: MEDICARE

## 2025-06-30 VITALS
DIASTOLIC BLOOD PRESSURE: 70 MMHG | WEIGHT: 218.8 LBS | SYSTOLIC BLOOD PRESSURE: 112 MMHG | OXYGEN SATURATION: 96 % | HEART RATE: 58 BPM | HEIGHT: 70 IN | BODY MASS INDEX: 31.32 KG/M2

## 2025-06-30 DIAGNOSIS — E11.9 TYPE 2 DIABETES MELLITUS WITHOUT COMPLICATION, WITHOUT LONG-TERM CURRENT USE OF INSULIN: ICD-10-CM

## 2025-06-30 DIAGNOSIS — F51.01 PRIMARY INSOMNIA: ICD-10-CM

## 2025-06-30 DIAGNOSIS — Z00.00 MEDICARE ANNUAL WELLNESS VISIT, SUBSEQUENT: Primary | ICD-10-CM

## 2025-06-30 DIAGNOSIS — D50.8 IRON DEFICIENCY ANEMIA SECONDARY TO INADEQUATE DIETARY IRON INTAKE: ICD-10-CM

## 2025-06-30 DIAGNOSIS — N18.32 STAGE 3B CHRONIC KIDNEY DISEASE: ICD-10-CM

## 2025-06-30 DIAGNOSIS — E78.2 MIXED HYPERLIPIDEMIA: ICD-10-CM

## 2025-06-30 DIAGNOSIS — E55.9 VITAMIN D DEFICIENCY: ICD-10-CM

## 2025-06-30 DIAGNOSIS — I10 BENIGN ESSENTIAL HTN: ICD-10-CM

## 2025-06-30 RX ORDER — TRAZODONE HYDROCHLORIDE 50 MG/1
TABLET ORAL
Qty: 90 TABLET | Refills: 1 | Status: SHIPPED | OUTPATIENT
Start: 2025-06-30

## 2025-06-30 NOTE — ASSESSMENT & PLAN NOTE
Patient having issues staying asleep as of his 6/25 OV.  Melatonin has not helped.  Discussed with him we should be able to help this with low-dose trazodone, start with 25 mg and titrate gradually.

## 2025-06-30 NOTE — ASSESSMENT & PLAN NOTE
AWV was completed 6/25.  Patient remains active and independent, but he have an accidental fall back in 3/25.  Injured his left hand, but no LOC, no head trauma etc.    No overnight hospitalizations past year.  His wife is his medical POA, if she is unable to speak for him,   their son Telly Tariq would be the next in line

## 2025-06-30 NOTE — ASSESSMENT & PLAN NOTE
A1c is only 6.9 as of his 6/25 OV, he is just on low-dose glimepiride, certainly appropriate to continue same.

## 2025-06-30 NOTE — PROGRESS NOTES
Chief Complaint  Hypertension, Medicare Wellness-subsequent (Pt had labs, he states that this is routine, he had MOLE surgery recently and he hurt his hand in March, he wear a brace and now it is hurting again, He had an injection with Dr. Meredith and it is better. ), Insomnia (Pt is having trouble staying asleep and at times he is not able to sleep. Melatonin is not helping. ), and Urinary Incontinence (He would like to talk about a surgery they have for this, wanted your opinion. )    Subjective          Telly Fitch presents to [facility]     Diabetes  Pertinent negatives for hypoglycemia include no confusion. Pertinent negatives for diabetes include no blurred vision, no chest pain and no fatigue.   Hypertension  Associated symptoms: no blurred vision, no chest pain, no palpitations and no shortness of breath    Insomnia  Symptoms: no abdominal pain, no chest pain, no congestion, no cough, no fatigue, no fever and no dysuria      History of present illness:  Patient pleasant 79-year-old male with underlying hypertension, hyperlipidemia, chronic kidney disease stage 3a, as well as diabetes mellitus, who is coming in 12/24 for routine 4-6-month follow-up.  We will review his labs, address any new concerns, and make new recommendations at that time.    Review of Systems   Constitutional:  Negative for appetite change, fatigue and fever.   HENT:  Negative for congestion and ear pain.    Eyes:  Negative for blurred vision.   Respiratory:  Negative for cough, chest tightness, shortness of breath and wheezing.    Cardiovascular:  Negative for chest pain, palpitations and leg swelling.   Gastrointestinal:  Negative for abdominal pain.   Genitourinary:  Negative for difficulty urinating, dysuria and hematuria.   Musculoskeletal:  Negative for arthralgias and gait problem.   Skin:  Negative for skin lesions.   Neurological:  Negative for syncope, memory problem and confusion.  "  Psychiatric/Behavioral:  Negative for self-injury and depressed mood. The patient has insomnia.        Objective   Vital Signs:   /70   Pulse 58   Ht 177.8 cm (70\")   Wt 99.2 kg (218 lb 12.8 oz)   SpO2 96%   BMI 31.39 kg/m²           Physical Exam  Vitals and nursing note reviewed.   Constitutional:       General: He is not in acute distress.     Appearance: Normal appearance. He is not toxic-appearing.   HENT:      Head: Atraumatic.      Right Ear: External ear normal.      Left Ear: External ear normal.      Nose: Nose normal.      Mouth/Throat:      Mouth: Mucous membranes are moist.   Eyes:      General:         Right eye: No discharge.         Left eye: No discharge.      Extraocular Movements: Extraocular movements intact.      Pupils: Pupils are equal, round, and reactive to light.   Cardiovascular:      Rate and Rhythm: Normal rate and regular rhythm.      Pulses: Normal pulses.      Heart sounds: Normal heart sounds. No murmur heard.     No gallop.   Pulmonary:      Effort: Pulmonary effort is normal. No respiratory distress.      Breath sounds: No wheezing, rhonchi or rales.   Abdominal:      General: There is no distension.      Palpations: Abdomen is soft. There is no mass.      Tenderness: There is no abdominal tenderness. There is no guarding.   Musculoskeletal:         General: No swelling or tenderness.      Cervical back: No tenderness.      Right lower leg: No edema.      Left lower leg: No edema.   Skin:     General: Skin is warm and dry.      Findings: No rash.   Neurological:      General: No focal deficit present.      Mental Status: He is alert and oriented to person, place, and time. Mental status is at baseline.      Motor: No weakness.      Gait: Gait normal.   Psychiatric:         Mood and Affect: Mood normal.         Thought Content: Thought content normal.          Result Review :   The following data was reviewed by: Anuel Fisher MD on 09/16/2021:                "   Assessment and Plan    Diagnoses and all orders for this visit:    1. Medicare annual wellness visit, subsequent (Primary)  Assessment & Plan:  AWV was completed 6/25.  Patient remains active and independent, but he have an accidental fall back in 3/25.  Injured his left hand, but no LOC, no head trauma etc.    No overnight hospitalizations past year.  His wife is his medical POA, if she is unable to speak for him,   their son Telly Tariq would be the next in line             2. Primary insomnia  Assessment & Plan:  Patient having issues staying asleep as of his 6/25 OV.  Melatonin has not helped.  Discussed with him we should be able to help this with low-dose trazodone, start with 25 mg and titrate gradually.      3. Benign essential HTN  Assessment & Plan:  Patient's blood pressure is well-controlled and his pulse is right around 60 as of his 6/25 OV.  He is on full dose nifedipine, moderate doses of losartan and doxazosin, as well as low to moderate dose Bystolic.  He has low-dose Lasix as well.  Patient stable to continue same plan of care.    Orders:  -     Comprehensive metabolic panel; Future    4. Mixed hyperlipidemia  Assessment & Plan:  LDL has trended down gradually from 84 to 62 as of his 6/25 OV.  He is just on low to moderate dose pravastatin still and stable to continue same.    Orders:  -     Lipid panel; Future    5. Type 2 diabetes mellitus without complication, without long-term current use of insulin  Overview:  No metformin = CKD3b.    SGLT2 inhibitors are contraindicated,   patient has urinary incontinence, followed by urology.    Assessment & Plan:  A1c is only 6.9 as of his 6/25 OV, he is just on low-dose glimepiride, certainly appropriate to continue same.    Orders:  -     Microalbumin / Creatinine Urine Ratio - Urine, Clean Catch; Future  -     Hemoglobin A1c; Future    6. Stage 3b chronic kidney disease  Assessment & Plan:  Patient is GFR is up from 20 to to 27 as of his 6/25 OV.  He  is followed by nephrology, very stable, can continue same meds.    Orders:  -     CBC w AUTO Differential; Future    7. Iron deficiency anemia secondary to inadequate dietary iron intake  Overview:  Erythropoietin level is normal at 10.5 as of 8/24.    COLON/EGD 4/18...polyps x1...f/u per Dr Duarte...capsule endo neg '19.      Assessment & Plan:  Hemoglobin is stable 11.6 as of 6/25 OV.  As noted above his Epogen level was normal just about a year ago.  Iron studies look good presently.      8. Vitamin D deficiency  Assessment & Plan:  Vitamin D is 45 in 5/25, he is on weekly prescription dosing and should continue same.    Orders:  -     Vitamin D,25-Hydroxy; Future    Other orders  -     traZODone (DESYREL) 50 MG tablet; 1/2 tablet 90 minutes prior to sleep, may titrate dose gradually until taking 2 tablets if needed.  Dispense: 90 tablet; Refill: 1                   --  --  OLDER NOTES:  VISIT 5/21:  --  HTN typically controlled and please check at home...HCTZ added since last OV; currently high in AM, ? related to CPAP he quit using; will change flomax to cardura and titrate; if no benefit, then will need to get back on CPAP...try 12.5 HCTZ given urinary c/o...seeing Dr Barrera and I rec 24-hr BP monitor and then review results with him on RTO ( ? related to not being on CPAP)...better 8/18, but will try off HCTZ given incont and increase cardura...has not needed PRN clonidine given at 1/19 urgent visit; stable 2/19...up 3/20 and will see if RYR helps=wife thinks it will...145/70 at home=bring wrist cuff on RTO...his cuff reads about 10 pts high top/bottom, but his numbers are up more at home as well; will max out cardura 9/20---> wel controlled 5/21.  CKD3a stable=1.6 (42%) and d/w no nsaids...stable 10/16 = 44%...54% nice...42% ? realted to recent stone tx...46% is holding...53%...48%...60% is nice to see as of 9/20 OV...55% is fine 1/21---> 42% is a bit concerning b/c of proteinuria, but not too far off his  norm.  --  LIPIDS...he's maxed out as far as TG's go, but ; rec add fish oil...TG's down with wt loss and/or fish oil... and defer changes...110...99...114 ok for now 2/19...109 in 3/20 and will stop Tricor and try RYR with the statin...TG's 250 off tricor and on RYR; LDL only 70, so no changes needed---> 80 in 1/21.  CP is vague and has appt with cards next week or two...S/P CATH 4/13 with no signif lesion.  --  DM up some to 6.8, but no change meds needed...ditto for 7.0 after holidays...6.0 with wt loss, so lower amaryl to qd...5.5 so stop it...6.8 so resume 1/2 of 2 mg tab in AM only...he was on 1/2 bid up until just a week ago; A1C 6.0, so ok to stay on 1/2 bid...6.3...6.1...5.9 and rec only take 1/2 in AM...6.4 is fine and will stop it on RTO if same...6.0 and will stop the 2 mg of amaryl now=2/19...6.8...6.3 is better ballpark off it---> 6.3 is same in 5/21.  MICRO-ALB = 140 at 2/18 OV; on max ARB/CCB, so will just monitor for now---> was 500 in '20 and now is 5000 in 5/21 = to RENAL and I will get U/S.  MORBID OBESITY down 25 past 6 mo to 226 at 6/16 OV--->225 holding.  --  PULM NODULES 11/14 (former tobacco screening)...no change 6/15 with f/u needed...LLL 5 mm is stable many years, but RUL 7 mm needs one more...neg and no further rec.  --  UGI BLEED=PUD s/p inject...Dr Duarte 12/12 with neg EGD...Hgb recovered...12.5 so resume iron...13.1 better and likely== CKD...12.7 is fine...13 +  FE DEF ANEMIA and I rec lower to qd for ferritin of 310 at 10/17 OV...12.2 and ferritin better, so stay on qd...had scopes and has f/u with Dr Duarte for path and labs as of 5/18 OV...12.2... 12.7 and ok to stay on for now...12.1 with stable iron is ok...11.4 is wrong direction, so will ask Dr Arguello to eval...she has him on more iron and B12; had capsule endoscopy neg '19 as well=defer to Dr Arguello---> 12.7 in 1/21 with stable iron.  LFT's with mild bump that Dr Duarte noted as well...wnl...still wnl, but agree  with checking Hep C... Hep B/C neg.  GERD w/o dysphagia.  --  A.R.....on zyrtec/nasonex/singulair, and sees Dr Charles...had CT per Dr Mosqueda (ENT).  RAD w/o flare.   RUPESH on CPAP @ 15 cmH2O...quit using it past year b/c issues with getting parts?  --  URGENT VISIT 7/19 and 11/20:  R LEG PAIN=twisted knee a few weeks ago, and then fell in hole yesterday; throbs at rest, and painful to walk on; no swelling in knee, and actually says pain is moreso in the hip; decent ROM, but has point tenderness in the greater trochanter; per orders and call Monday.  LBP into L LEG=I d/w him it's not the hip=saw Dr Masoud last month; no change bowel/bladder; is tender L greater trochanter, neg SLR; will get into PTA for L hip and LBP and add gabapentin/medrol...sxs resolved with just gabapentin and ? with passing kidney stone=d/w try weaning off it now...sxs in L hand and in L leg as of 3/20 OV that may be overuse syndrome=laying tile/etc for past 3 months; will get NCS given weak  and refer to Dr Randhawa again if worse.  --  KIDNEY STONES per a Dr Benavidez.  BPH/OAB per Urology in [place].  --  PSA 0.3 (7/11/12)...defer  COLON/EGD 6/23 = 5 polyps = 3 years per Dr. Campos.  (, my pt, 2 children)    Follow Up   Return in about 6 months (around 12/30/2025).  Patient was given instructions and counseling regarding his condition or for health maintenance advice. Please see specific information pulled into the AVS if appropriate.

## 2025-06-30 NOTE — ASSESSMENT & PLAN NOTE
LDL has trended down gradually from 84 to 62 as of his 6/25 OV.  He is just on low to moderate dose pravastatin still and stable to continue same.

## 2025-06-30 NOTE — PROGRESS NOTES
Subjective   The ABCs of the Annual Wellness Visit  Medicare Wellness Visit      Telly Fitch is a 79 y.o. patient who presents for a Medicare Wellness Visit.    The following portions of the patient's history were reviewed and   updated as appropriate: allergies, current medications, past family history, past medical history, past social history, past surgical history, and problem list.    Compared to one year ago, the patient's physical   health is the same.  Compared to one year ago, the patient's mental   health is the same.    Recent Hospitalizations:  He was not admitted to the hospital during the last year.     Current Medical Providers:  Patient Care Team:  Anuel Fisher MD as PCP - General (Internal Medicine)  Angela New APRN as Nurse Practitioner (Urology)  Reina Guerra MD as Consulting Physician (Nephrology)  Abebe Bey MD as Consulting Physician (Urology)  Darirus Meredith MD as Surgeon (Orthopedic Surgery)    Outpatient Medications Prior to Visit   Medication Sig Dispense Refill    ascorbic acid (VITAMIN C) 1000 MG tablet 1 tablet 2 (Two) Times a Day.      aspirin 81 MG chewable tablet aspirin 81 mg oral tablet,chewable chew 1 tablet (81 mg) by oral route once daily   Suspended      dicyclomine (BENTYL) 20 MG tablet As Needed.      doxazosin (CARDURA) 2 MG tablet Take 4 tablets by mouth Daily. 360 tablet 3    ergocalciferol (ERGOCALCIFEROL) 1.25 MG (71305 UT) capsule Take 1 capsule by mouth 1 (One) Time Per Week. 12 capsule 3    finasteride (PROSCAR) 5 MG tablet Take 1 tablet by mouth Daily. 90 tablet 3    furosemide (LASIX) 20 MG tablet Take 1 tablet by mouth Daily. 90 tablet 3    glimepiride (Amaryl) 1 MG tablet Take 1 tablet by mouth Every Morning Before Breakfast. 90 tablet 1    losartan (Cozaar) 50 MG tablet Take 1 tablet by mouth Daily. 90 tablet 3    metroNIDAZOLE (METROCREAM) 0.75 % cream       multivitamin (THERAGRAN) tablet tablet Take  by mouth Daily.       nebivolol (Bystolic) 5 MG tablet Take 1 tablet by mouth Daily. 90 tablet 1    NIFEdipine XL (PROCARDIA XL) 90 MG 24 hr tablet Take 1 tablet by mouth Daily. 90 tablet 3    nitroglycerin (Nitrostat) 0.4 MG SL tablet Place 1 tablet under the tongue Every 5 (Five) Minutes As Needed for Chest Pain. Take no more than 3 doses in 15 minutes. 30 tablet 1    omeprazole (PrilOSEC) 20 MG capsule Take 1 capsule by mouth Daily. 90 capsule 3    pravastatin (PRAVACHOL) 20 MG tablet Take 1 tablet by mouth Every Night. 90 tablet 3    Red Yeast Rice Extract 600 MG capsule Take 1 tablet by mouth.      sodium bicarbonate 650 MG tablet Take 1 tablet by mouth 3 (Three) Times a Day.      tolterodine LA (DETROL LA) 2 MG 24 hr capsule Take 2 capsules by mouth Daily. 180 capsule 3    vitamin B-12 (CYANOCOBALAMIN) 1000 MCG tablet Take 1 tablet by mouth Daily. 90 tablet 3    vitamin E 400 UNIT capsule Take 1 capsule by mouth Daily.      Zinc 50 MG tablet Take  by mouth Daily.       No facility-administered medications prior to visit.     No opioid medication identified on active medication list. I have reviewed chart for other potential  high risk medication/s and harmful drug interactions in the elderly.      Aspirin is on active medication list. Aspirin use is indicated based on review of current medical condition/s. Pros and cons of this therapy have been discussed today. Benefits of this medication outweigh potential harm.  Patient has been encouraged to continue taking this medication.  .      Patient Active Problem List   Diagnosis    Benign essential HTN    Radiculopathy due to lumbar intervertebral disc disorder    Obstructive sleep apnea    Mixed hyperlipidemia    Iron deficiency anemia secondary to inadequate dietary iron intake    Type 2 diabetes mellitus without complication, without long-term current use of insulin    History of peptic ulcer disease    Vitamin D deficiency    Mild intermittent asthma without complication    Stage  "3b chronic kidney disease    Medicare annual wellness visit, subsequent    Continuous leakage    Scrotal mass    Urge incontinence    Benign prostatic hyperplasia with lower urinary tract symptoms    Nephrolithiasis    Urgency of urination     Advance Care Planning Advance Directive is on file.  ACP discussion was held with the patient during this visit. Patient has an advance directive in EMR which is still valid.             Objective   Vitals:    25 1229   BP: 112/70   Pulse: 58   SpO2: 96%   Weight: 99.2 kg (218 lb 12.8 oz)   Height: 177.8 cm (70\")   PainSc: 0-No pain       Estimated body mass index is 31.39 kg/m² as calculated from the following:    Height as of this encounter: 177.8 cm (70\").    Weight as of this encounter: 99.2 kg (218 lb 12.8 oz).    BMI is >= 30 and <35. (Class 1 Obesity). The following options were offered after discussion;: exercise counseling/recommendations and nutrition counseling/recommendations           Does the patient have evidence of cognitive impairment? No  Lab Results   Component Value Date    TRIG 311 (H) 2025    HDL 23 (L) 2025    LDL 62 2025    VLDL 49 (H) 2025    HGBA1C 6.90 (H) 2025                                                                                               Health  Risk Assessment    Smoking Status:  Social History     Tobacco Use   Smoking Status Never    Passive exposure: Never   Smokeless Tobacco Never     Alcohol Consumption:  Social History     Substance and Sexual Activity   Alcohol Use Never       Fall Risk Screen  STEADI Fall Risk Assessment was completed, and patient is at HIGH risk for falls. Assessment completed on:2025    Depression Screening   Little interest or pleasure in doing things? Not at all   Feeling down, depressed, or hopeless? Not at all   PHQ-2 Total Score 0      Health Habits and Functional and Cognitive Screenin/30/2025    12:28 PM   Functional & Cognitive Status   Do you have " difficulty preparing food and eating? No   Do you have difficulty bathing yourself, getting dressed or grooming yourself? No   Do you have difficulty using the toilet? No   Do you have difficulty moving around from place to place? No   Do you have trouble with steps or getting out of a bed or a chair? No   Current Diet Well Balanced Diet   Dental Exam Up to date   Eye Exam Up to date   Exercise (times per week) 7 times per week   Current Exercises Include Walking   Do you need help using the phone?  No   Are you deaf or do you have serious difficulty hearing?  No   Do you need help to go to places out of walking distance? No   Do you need help shopping? No   Do you need help preparing meals?  No   Do you need help with housework?  No   Do you need help with laundry? No   Do you need help taking your medications? No   Do you need help managing money? No   Do you ever drive or ride in a car without wearing a seat belt? No   Have you felt unusual fatigue (could be tiredness), stress, anger or loneliness in the last month? No   Who do you live with? Spouse   If you need help, do you have trouble finding someone available to you? No   Have you been bothered in the last four weeks by sexual problems? No   Do you have difficulty concentrating, remembering or making decisions? No           Age-appropriate Screening Schedule:  Refer to the list below for future screening recommendations based on patient's age, sex and/or medical conditions. Orders for these recommended tests are listed in the plan section. The patient has been provided with a written plan.    Health Maintenance List  Health Maintenance   Topic Date Due    DIABETIC FOOT EXAM  Never done    URINE MICROALBUMIN-CREATININE RATIO (uACR)  05/11/2022    RSV Vaccine - Adults (1 - 1-dose 75+ series) Never done    DIABETIC EYE EXAM  11/20/2024    ANNUAL WELLNESS VISIT  04/22/2025    INFLUENZA VACCINE  07/01/2025    HEMOGLOBIN A1C  12/25/2025    LIPID PANEL  06/25/2026     TDAP/TD VACCINES (2 - Td or Tdap) 12/14/2028    HEPATITIS C SCREENING  Completed    Pneumococcal Vaccine 50+  Completed    Hepatitis B  Aged Out    COVID-19 Vaccine  Discontinued    ZOSTER VACCINE  Discontinued    COLORECTAL CANCER SCREENING  Discontinued                                                                                                                                                CMS Preventative Services Quick Reference  Risk Factors Identified During Encounter  None Identified    The above risks/problems have been discussed with the patient.  Pertinent information has been shared with the patient in the After Visit Summary.  An After Visit Summary and PPPS were made available to the patient.    Follow Up:   Next Medicare Wellness visit to be scheduled in 1 year.     Assessment & Plan  Medicare annual wellness visit, subsequent  AWV was completed 6/25.  Patient remains active and independent, but he have an accidental fall back in 3/25.  Injured his left hand, but no LOC, no head trauma etc.    No overnight hospitalizations past year.  His wife is his medical POA, if she is unable to speak for him,   their son Telly Tariq would be the next in line              Follow Up:   No follow-ups on file.

## 2025-06-30 NOTE — ASSESSMENT & PLAN NOTE
Patient is GFR is up from 20 to to 27 as of his 6/25 OV.  He is followed by nephrology, very stable, can continue same meds.

## 2025-06-30 NOTE — ASSESSMENT & PLAN NOTE
Hemoglobin is stable 11.6 as of 6/25 OV.  As noted above his Epogen level was normal just about a year ago.  Iron studies look good presently.

## 2025-06-30 NOTE — ASSESSMENT & PLAN NOTE
Patient's blood pressure is well-controlled and his pulse is right around 60 as of his 6/25 OV.  He is on full dose nifedipine, moderate doses of losartan and doxazosin, as well as low to moderate dose Bystolic.  He has low-dose Lasix as well.  Patient stable to continue same plan of care.

## 2025-07-22 ENCOUNTER — TELEPHONE (OUTPATIENT)
Dept: UROLOGY | Age: 80
End: 2025-07-22
Payer: MEDICARE

## 2025-07-22 PROBLEM — N50.9 SCROTAL LESION: Status: ACTIVE | Noted: 2025-07-22

## 2025-07-22 NOTE — TELEPHONE ENCOUNTER
----- Message from Abebe Bey sent at 7/22/2025  5:30 AM EDT -----  Regarding: ?  Coming in Friday, supposed to have a scrotal ultrasound before, can we check on this

## 2025-07-22 NOTE — PROGRESS NOTES
Chief Complaint    Urologic complaint    Subjective          Telly Fitch presents to [facility]  History of Present Illness          Nephrolithiasis  Extratesticular lesions  Urge  incontinence  BPH      7/24/2025 scrotal ultrasound-  Previously identified mass not visualized on today's exam.  Complex right hydrocele      Left-sided scrotal pain bothersome intermittently.  Only with movement. Bothersome  Worse now than before.        Voiding Okay.  Having insensate incontinence.  No sensation.    2 pads daily.  Insensate incontinence.    Some urgency but no urge incontinence  On tolterodine 4 mg daily -patient is dealing with quite a bit of leakage.  Helping.    On doxazosin, finasteride  No straining    Nocturia 3-4,       nitroglycerin , CAD,  IIIb chronic kidney disease      10 stones, 3 lithotripsies        PVR    7/25  000            Previous      1/21/2025 CT abdomen/pelvis without - bilateral nonobstructing nephrolithiasis.  2 - 4 mm stones nonobstructing right kidney.  6 mm, 4 mm, a 3 mm stones left kidney nonobstructing.  Passed a stone since his last visit    12/24 , beta-hCG<1, alpha-fetoprotein< 2    12/24 2.7, GFR 23    12/24/2024 scrotal ultrasound-no intratesticular mass or torsion.  3.1 cm complex cystic mass along the medial aspect of the right testicle measured 4 cm in greatest dimension on 6/24.  2.6 cm solid mass along the lateral aspect of the right testicle measuring 1.8 in greatest mention on 6/26/2024.    3/15/2023 CT abdomen/pelvis without - right kidney has a 4 mm and 3 mm nonobstructing renal stone.  Left kidney - 12 x 5 mm, 8 mm, 4 mm obstructing stones left kidney.  Rounded fluid density masses in both kidneys likely cyst.    Gemtesa-did not help  Myrbetriq - diarrhea and headaches        hx of renal stones.     No FH of  malignancies.    6/23 UDS - decreased sensation, max capacity 50, Q-Jean 10  VV 30, Pdet  78              Past  History:  Medical History: has a past medical history of Acute peptic ulcer, site unspecified, without hemorrhage or perforation, Basal cell carcinoma, BPH (benign prostatic hyperplasia), CAD (coronary artery disease), Cancer, Chronic kidney disease, stage 3, DJD (degenerative joint disease), Enlarged prostate without lower urinary tract symptoms (luts), Essential (primary) hypertension, Hypercholesteremia, Intervertebral disc disorders with radiculopathy, lumbar region, Iron deficiency anemia, unspecified, Mixed hyperlipidemia, Morbid (severe) obesity due to excess calories, Nephrolithiasis, Nicotine dependence, unspecified, uncomplicated, RUPESH (obstructive sleep apnea), RAD (reactive airway disease), Sleep apnea, unspecified, Type 2 diabetes mellitus with other diabetic kidney complication, and Urinary incontinence.   Surgical History: has a past surgical history that includes Colonoscopy; Kidney stone surgery (Bilateral); Cataract extraction, bilateral; Umbilical hernia repair (N/A); Dental surgery (07/2019); Excision basal cell carcinoma (5/2023, 6/2023 6/2020; 11/12/2020; 12/2019); Other surgical history; Skin cancer destruction (Right, 07/2019); Inguinal hernia repair (Bilateral); Esophagogastroduodenoscopy (N/A, 06/06/2023); and Colonoscopy (N/A, 06/06/2023).   Family History: family history is not on file.   Social History: reports that he has never smoked. He has never been exposed to tobacco smoke. He has never used smokeless tobacco. He reports that he does not drink alcohol and does not use drugs.  Allergies: Jardiance [empagliflozin], Contrast dye (echo or unknown ct/mr), Lactose intolerance (gi), Latex, and Statins              Objective     Vital Signs:   There were no vitals taken for this visit.             Assessment and Plan    Diagnoses and all orders for this visit:    1. Benign prostatic hyperplasia with lower urinary tract symptoms, symptom details unspecified (Primary)    2. Urgency of  urination    3. Scrotal lesion          BPH    Continue doxazosin and finasteride          Urgency    Continue tolterodine      Discussed doing cystoscopy and possible UDS for his incontinence.  At this time after discussion he would like to leave this alone no further workup warranted, he will let us know if things change              Extratesticular lesion      Scrotal ultrasound reviewed.   Neg. Pt given reassurance.          Left scrotal pain      Patient is bothered, after discussion we will place him on doxycycline 100 mg p.o. twice daily x 3 weeks.  Risk and benefits discussed        Follow-up in 1 year with NP

## 2025-07-22 NOTE — TELEPHONE ENCOUNTER
Called Pt. Spouse answered.     I called and reminded Pt to not forget to do their Scrotal Ultrasound. Pt has it scheduled for the 24th at 11:45 am. Pt spouse verbalized they would not forget and will see us on Friday.

## 2025-07-24 ENCOUNTER — HOSPITAL ENCOUNTER (OUTPATIENT)
Dept: ULTRASOUND IMAGING | Facility: HOSPITAL | Age: 80
Discharge: HOME | End: 2025-07-24
Admitting: UROLOGY
Payer: MEDICARE

## 2025-07-24 DIAGNOSIS — N50.89 SCROTAL MASS: ICD-10-CM

## 2025-07-24 DIAGNOSIS — N40.1 BENIGN PROSTATIC HYPERPLASIA WITH LOWER URINARY TRACT SYMPTOMS, SYMPTOM DETAILS UNSPECIFIED: ICD-10-CM

## 2025-07-24 PROCEDURE — 76870 US EXAM SCROTUM: CPT

## 2025-07-25 ENCOUNTER — OFFICE VISIT (OUTPATIENT)
Dept: UROLOGY | Age: 80
End: 2025-07-25
Payer: MEDICARE

## 2025-07-25 ENCOUNTER — TELEPHONE (OUTPATIENT)
Age: 80
End: 2025-07-25
Payer: MEDICARE

## 2025-07-25 VITALS — HEIGHT: 70 IN | BODY MASS INDEX: 31.39 KG/M2

## 2025-07-25 DIAGNOSIS — R01.1 HEART MURMUR: Primary | ICD-10-CM

## 2025-07-25 DIAGNOSIS — N40.1 BENIGN PROSTATIC HYPERPLASIA WITH LOWER URINARY TRACT SYMPTOMS, SYMPTOM DETAILS UNSPECIFIED: Primary | ICD-10-CM

## 2025-07-25 DIAGNOSIS — R39.15 URGENCY OF URINATION: ICD-10-CM

## 2025-07-25 DIAGNOSIS — N50.82 SCROTAL PAIN: ICD-10-CM

## 2025-07-25 DIAGNOSIS — N50.9 SCROTAL LESION: ICD-10-CM

## 2025-07-25 RX ORDER — DOXYCYCLINE 100 MG/1
100 CAPSULE ORAL 2 TIMES DAILY
Qty: 42 CAPSULE | Refills: 0 | Status: SHIPPED | OUTPATIENT
Start: 2025-07-25 | End: 2025-08-15

## 2025-07-25 NOTE — TELEPHONE ENCOUNTER
PT IS REQUESTING A REFERRAL TO DR. STOKES AT [facility], I HAVE THIS PENDED IF YOU AGREE.

## (undated) DEVICE — BLCK/BITE BLOX WO/DENTL/RIM W/STRAP 54F

## (undated) DEVICE — SOLIDIFIER LIQLOC PLS 1500CC BT

## (undated) DEVICE — CONN JET HYDRA H20 AUXILIARY DISP

## (undated) DEVICE — GLV SURG BIOGEL LTX PF 7 1/2

## (undated) DEVICE — SINGLE-USE BIOPSY FORCEPS: Brand: RADIAL JAW 4

## (undated) DEVICE — Device

## (undated) DEVICE — Device: Brand: DEFENDO AIR/WATER/SUCTION AND BIOPSY VALVE

## (undated) DEVICE — SNAR POLYP CAPTIFLEX XS/OVL 11X2.4MM 240CM 1P/U

## (undated) DEVICE — SOL IRRG H2O PL/BG 1000ML STRL

## (undated) DEVICE — SOL IRR NACL 0.9PCT BT 1000ML

## (undated) DEVICE — LINER SURG CANSTR SXN S/RIGD 1500CC

## (undated) DEVICE — THE SINGLE USE ETRAP – POLYP TRAP IS USED FOR SUCTION RETRIEVAL OF ENDOSCOPICALLY REMOVED POLYPS.: Brand: ETRAP